# Patient Record
Sex: FEMALE | Race: WHITE | NOT HISPANIC OR LATINO | Employment: OTHER | URBAN - METROPOLITAN AREA
[De-identification: names, ages, dates, MRNs, and addresses within clinical notes are randomized per-mention and may not be internally consistent; named-entity substitution may affect disease eponyms.]

---

## 2023-03-17 LAB
ALANINE AMINOTRANSFERASE (SGPT) (U/L) IN SER/PLAS: 10 U/L (ref 7–45)
ALBUMIN (G/DL) IN SER/PLAS: 4.1 G/DL (ref 3.4–5)
ALKALINE PHOSPHATASE (U/L) IN SER/PLAS: 74 U/L (ref 33–136)
ANION GAP IN SER/PLAS: 13 MMOL/L (ref 10–20)
ASPARTATE AMINOTRANSFERASE (SGOT) (U/L) IN SER/PLAS: 24 U/L (ref 9–39)
BILIRUBIN TOTAL (MG/DL) IN SER/PLAS: 0.6 MG/DL (ref 0–1.2)
CALCIUM (MG/DL) IN SER/PLAS: 9.5 MG/DL (ref 8.6–10.3)
CARBON DIOXIDE, TOTAL (MMOL/L) IN SER/PLAS: 26 MMOL/L (ref 21–32)
CHLORIDE (MMOL/L) IN SER/PLAS: 103 MMOL/L (ref 98–107)
CHOLESTEROL (MG/DL) IN SER/PLAS: 226 MG/DL (ref 0–199)
CHOLESTEROL IN HDL (MG/DL) IN SER/PLAS: 52.5 MG/DL
CHOLESTEROL/HDL RATIO: 4.3
CREATININE (MG/DL) IN SER/PLAS: 1.75 MG/DL (ref 0.5–1.05)
ERYTHROCYTE DISTRIBUTION WIDTH (RATIO) BY AUTOMATED COUNT: 13.4 % (ref 11.5–14.5)
ERYTHROCYTE MEAN CORPUSCULAR HEMOGLOBIN CONCENTRATION (G/DL) BY AUTOMATED: 32.3 G/DL (ref 32–36)
ERYTHROCYTE MEAN CORPUSCULAR VOLUME (FL) BY AUTOMATED COUNT: 98 FL (ref 80–100)
ERYTHROCYTES (10*6/UL) IN BLOOD BY AUTOMATED COUNT: 4.51 X10E12/L (ref 4–5.2)
GFR FEMALE: 27 ML/MIN/1.73M2
GLUCOSE (MG/DL) IN SER/PLAS: 95 MG/DL (ref 74–99)
HEMATOCRIT (%) IN BLOOD BY AUTOMATED COUNT: 44 % (ref 36–46)
HEMOGLOBIN (G/DL) IN BLOOD: 14.2 G/DL (ref 12–16)
LDL: 148 MG/DL (ref 0–99)
LEUKOCYTES (10*3/UL) IN BLOOD BY AUTOMATED COUNT: 6.2 X10E9/L (ref 4.4–11.3)
PLATELETS (10*3/UL) IN BLOOD AUTOMATED COUNT: 239 X10E9/L (ref 150–450)
POTASSIUM (MMOL/L) IN SER/PLAS: 4.3 MMOL/L (ref 3.5–5.3)
PROTEIN TOTAL: 6.8 G/DL (ref 6.4–8.2)
SODIUM (MMOL/L) IN SER/PLAS: 138 MMOL/L (ref 136–145)
THYROTROPIN (MIU/L) IN SER/PLAS BY DETECTION LIMIT <= 0.05 MIU/L: 126 MIU/L (ref 0.44–3.98)
TRIGLYCERIDE (MG/DL) IN SER/PLAS: 129 MG/DL (ref 0–149)
UREA NITROGEN (MG/DL) IN SER/PLAS: 26 MG/DL (ref 6–23)
VLDL: 26 MG/DL (ref 0–40)

## 2023-03-20 ENCOUNTER — TELEPHONE (OUTPATIENT)
Dept: PRIMARY CARE | Facility: CLINIC | Age: 88
End: 2023-03-20
Payer: MEDICARE

## 2023-03-20 NOTE — TELEPHONE ENCOUNTER
----- Message from Zen Alvarez MD sent at 3/17/2023  3:35 PM EDT -----  Please inform the patient that her recent labs were all essentially normal.  Except her kidney function is slightly decreased from previous readings.  We will discuss this further at her upcoming appointment.

## 2023-03-21 ENCOUNTER — TELEPHONE (OUTPATIENT)
Dept: PRIMARY CARE | Facility: CLINIC | Age: 88
End: 2023-03-21
Payer: MEDICARE

## 2023-03-21 DIAGNOSIS — E03.9 HYPOTHYROIDISM, UNSPECIFIED TYPE: ICD-10-CM

## 2023-03-21 RX ORDER — LEVOTHYROXINE SODIUM 125 UG/1
125 TABLET ORAL DAILY
COMMUNITY
End: 2023-03-21 | Stop reason: SDUPTHER

## 2023-03-21 NOTE — TELEPHONE ENCOUNTER
----- Message from Zen Alvarez MD sent at 3/21/2023  8:01 AM EDT -----  Okay please send in 6 months of refills of this medication to their preferred pharmacy.  We should recheck her TSH in 3 months once she is taking it correctly.  Please place an order for this lab.  ----- Message -----  From: Bria Greene MA  Sent: 3/20/2023   4:51 PM EDT  To: Zen Alvarez MD    Spoke with pt she has not taken for a month, I reached out to [facility] [employer] mail delivery and it was on hold. Spoke with daughter and she [facility]   ----- Message -----  From: Zen Alvarez MD  Sent: 3/20/2023   7:20 AM EDT  To: Bria Greene MA    Please inform the patient that her recent thyroid labs show that her thyroid is very low.  Is she taking the levothyroxine 125 mcg daily?  I suspect she may not be taking it correctly because her most recent labs were normal at this dose.  Please let me know.

## 2023-03-22 RX ORDER — LEVOTHYROXINE SODIUM 125 UG/1
125 TABLET ORAL DAILY
Qty: 90 TABLET | Refills: 1 | Status: SHIPPED | OUTPATIENT
Start: 2023-03-22 | End: 2023-08-16 | Stop reason: SDUPTHER

## 2023-03-26 PROBLEM — M54.32 SCIATICA OF LEFT SIDE: Status: ACTIVE | Noted: 2023-03-26

## 2023-03-26 PROBLEM — L82.0 KERATOSIS, INFLAMED SEBORRHEIC: Status: ACTIVE | Noted: 2023-03-26

## 2023-03-26 PROBLEM — L57.0 ACTINIC KERATOSIS: Status: ACTIVE | Noted: 2023-03-26

## 2023-03-26 PROBLEM — S52.502A DISTAL RADIUS FRACTURE, LEFT: Status: ACTIVE | Noted: 2023-03-26

## 2023-03-26 PROBLEM — I10 HYPERTENSION: Status: ACTIVE | Noted: 2023-03-26

## 2023-03-26 PROBLEM — M19.90 OSTEOARTHRITIS: Status: ACTIVE | Noted: 2023-03-26

## 2023-03-26 PROBLEM — R07.81 RIB PAIN ON LEFT SIDE: Status: ACTIVE | Noted: 2023-03-26

## 2023-03-26 PROBLEM — C44.91 BCC (BASAL CELL CARCINOMA): Status: ACTIVE | Noted: 2023-03-26

## 2023-03-26 PROBLEM — R60.9 EDEMA: Status: ACTIVE | Noted: 2023-03-26

## 2023-03-26 PROBLEM — L71.9 ROSACEA: Status: ACTIVE | Noted: 2023-03-26

## 2023-03-26 PROBLEM — R33.9 URINARY RETENTION: Status: ACTIVE | Noted: 2023-03-26

## 2023-03-26 PROBLEM — E03.9 HYPOTHYROIDISM: Status: ACTIVE | Noted: 2023-03-26

## 2023-03-26 PROBLEM — L85.3 XEROSIS OF SKIN: Status: ACTIVE | Noted: 2023-03-26

## 2023-03-26 PROBLEM — L21.9 SEBORRHEIC DERMATITIS: Status: ACTIVE | Noted: 2023-03-26

## 2023-03-26 PROBLEM — M70.60 TROCHANTERIC BURSITIS: Status: ACTIVE | Noted: 2023-03-26

## 2023-03-26 PROBLEM — R73.9 HYPERGLYCEMIA: Status: ACTIVE | Noted: 2023-03-26

## 2023-03-26 PROBLEM — M19.90 DJD (DEGENERATIVE JOINT DISEASE): Status: ACTIVE | Noted: 2023-03-26

## 2023-03-26 PROBLEM — E55.9 VITAMIN D DEFICIENCY: Status: ACTIVE | Noted: 2023-03-26

## 2023-03-26 PROBLEM — B35.1 FUNGAL NAIL INFECTION: Status: ACTIVE | Noted: 2023-03-26

## 2023-03-26 PROBLEM — J38.3 DYSPHONIA, SPASMODIC: Status: ACTIVE | Noted: 2023-03-26

## 2023-03-26 PROBLEM — R53.1 GENERALIZED WEAKNESS: Status: ACTIVE | Noted: 2023-03-26

## 2023-03-26 PROBLEM — J30.2 SEASONAL ALLERGIES: Status: ACTIVE | Noted: 2023-03-26

## 2023-03-26 PROBLEM — S22.31XA RIGHT RIB FRACTURE: Status: ACTIVE | Noted: 2023-03-26

## 2023-03-26 PROBLEM — K58.9 IBS (IRRITABLE BOWEL SYNDROME): Status: ACTIVE | Noted: 2023-03-26

## 2023-03-26 PROBLEM — L20.9 ATOPIC DERMATITIS: Status: ACTIVE | Noted: 2023-03-26

## 2023-03-26 PROBLEM — R20.2 PARESTHESIA: Status: ACTIVE | Noted: 2023-03-26

## 2023-03-26 PROBLEM — G47.00 INSOMNIA: Status: ACTIVE | Noted: 2023-03-26

## 2023-03-26 PROBLEM — M79.675 CHRONIC PAIN OF TOE OF LEFT FOOT: Status: ACTIVE | Noted: 2023-03-26

## 2023-03-26 PROBLEM — H26.9 CATARACT: Status: ACTIVE | Noted: 2023-03-26

## 2023-03-26 PROBLEM — M35.9: Status: ACTIVE | Noted: 2023-03-26

## 2023-03-26 PROBLEM — F41.9 ANXIETY: Status: ACTIVE | Noted: 2023-03-26

## 2023-03-26 PROBLEM — K21.9 ESOPHAGEAL REFLUX: Status: ACTIVE | Noted: 2023-03-26

## 2023-03-26 PROBLEM — L30.1 DYSHIDROSIS: Status: ACTIVE | Noted: 2023-03-26

## 2023-03-26 PROBLEM — M79.7 FIBROMYALGIA: Status: ACTIVE | Noted: 2023-03-26

## 2023-03-26 PROBLEM — M79.674 CHRONIC PAIN OF TOE OF RIGHT FOOT: Status: ACTIVE | Noted: 2023-03-26

## 2023-03-26 PROBLEM — M20.41 ACQUIRED HAMMERTOE OF RIGHT FOOT: Status: ACTIVE | Noted: 2023-03-26

## 2023-03-26 PROBLEM — G89.29 CHRONIC PAIN OF TOE OF RIGHT FOOT: Status: ACTIVE | Noted: 2023-03-26

## 2023-03-26 PROBLEM — M20.5X2 HALLUX LIMITUS OF LEFT FOOT: Status: ACTIVE | Noted: 2023-03-26

## 2023-03-26 PROBLEM — S22.001A: Status: ACTIVE | Noted: 2023-03-26

## 2023-03-26 PROBLEM — R29.6 MULTIPLE FALLS: Status: ACTIVE | Noted: 2023-03-26

## 2023-03-26 PROBLEM — N18.30 CKD (CHRONIC KIDNEY DISEASE), STAGE III (MULTI): Status: ACTIVE | Noted: 2023-03-26

## 2023-03-26 PROBLEM — G89.29 CHRONIC PAIN OF TOE OF LEFT FOOT: Status: ACTIVE | Noted: 2023-03-26

## 2023-03-26 RX ORDER — MELATONIN 10 MG
CAPSULE ORAL
COMMUNITY
Start: 2022-08-24 | End: 2023-12-13 | Stop reason: ALTCHOICE

## 2023-03-26 RX ORDER — LOSARTAN POTASSIUM 25 MG/1
1 TABLET ORAL DAILY
COMMUNITY
End: 2023-08-16 | Stop reason: SDUPTHER

## 2023-03-26 RX ORDER — MONTELUKAST SODIUM 10 MG/1
1 TABLET ORAL DAILY
COMMUNITY
End: 2023-08-16 | Stop reason: SDUPTHER

## 2023-03-26 RX ORDER — OMEPRAZOLE 40 MG/1
1 CAPSULE, DELAYED RELEASE ORAL DAILY
COMMUNITY
Start: 2013-06-24 | End: 2023-08-16 | Stop reason: SDUPTHER

## 2023-03-26 RX ORDER — HYDROXYCHLOROQUINE SULFATE 200 MG/1
1.5 TABLET, FILM COATED ORAL DAILY
COMMUNITY
Start: 2013-06-24 | End: 2023-08-16 | Stop reason: SDUPTHER

## 2023-03-26 RX ORDER — HYDROXYZINE HYDROCHLORIDE 25 MG/1
1 TABLET, FILM COATED ORAL NIGHTLY
COMMUNITY
Start: 2020-08-18 | End: 2023-08-16 | Stop reason: SDUPTHER

## 2023-03-26 RX ORDER — AMLODIPINE BESYLATE 5 MG/1
1 TABLET ORAL DAILY
COMMUNITY
End: 2023-08-16 | Stop reason: SDUPTHER

## 2023-03-26 RX ORDER — ATENOLOL 25 MG/1
1 TABLET ORAL DAILY
COMMUNITY
Start: 2013-06-24 | End: 2023-08-16 | Stop reason: SDUPTHER

## 2023-03-26 RX ORDER — SERTRALINE HYDROCHLORIDE 50 MG/1
1 TABLET, FILM COATED ORAL DAILY
COMMUNITY
End: 2023-08-16 | Stop reason: SDUPTHER

## 2023-03-30 ENCOUNTER — OFFICE VISIT (OUTPATIENT)
Dept: PRIMARY CARE | Facility: CLINIC | Age: 88
End: 2023-03-30
Payer: MEDICARE

## 2023-03-30 VITALS
HEART RATE: 66 BPM | BODY MASS INDEX: 23.75 KG/M2 | WEIGHT: 121 LBS | DIASTOLIC BLOOD PRESSURE: 68 MMHG | TEMPERATURE: 98 F | RESPIRATION RATE: 18 BRPM | HEIGHT: 60 IN | SYSTOLIC BLOOD PRESSURE: 126 MMHG

## 2023-03-30 DIAGNOSIS — S20.212D CONTUSION OF RIB ON LEFT SIDE, SUBSEQUENT ENCOUNTER: Primary | ICD-10-CM

## 2023-03-30 DIAGNOSIS — S00.83XD CONTUSION OF FOREHEAD, SUBSEQUENT ENCOUNTER: ICD-10-CM

## 2023-03-30 PROBLEM — S52.502A DISTAL RADIUS FRACTURE, LEFT: Status: RESOLVED | Noted: 2023-03-26 | Resolved: 2023-03-30

## 2023-03-30 PROBLEM — L82.0 KERATOSIS, INFLAMED SEBORRHEIC: Status: RESOLVED | Noted: 2023-03-26 | Resolved: 2023-03-30

## 2023-03-30 PROBLEM — B35.1 FUNGAL NAIL INFECTION: Status: RESOLVED | Noted: 2023-03-26 | Resolved: 2023-03-30

## 2023-03-30 PROCEDURE — 1159F MED LIST DOCD IN RCRD: CPT | Performed by: FAMILY MEDICINE

## 2023-03-30 PROCEDURE — 3078F DIAST BP <80 MM HG: CPT | Performed by: FAMILY MEDICINE

## 2023-03-30 PROCEDURE — 1036F TOBACCO NON-USER: CPT | Performed by: FAMILY MEDICINE

## 2023-03-30 PROCEDURE — 1157F ADVNC CARE PLAN IN RCRD: CPT | Performed by: FAMILY MEDICINE

## 2023-03-30 PROCEDURE — 99214 OFFICE O/P EST MOD 30 MIN: CPT | Performed by: FAMILY MEDICINE

## 2023-03-30 PROCEDURE — 3074F SYST BP LT 130 MM HG: CPT | Performed by: FAMILY MEDICINE

## 2023-03-30 PROCEDURE — 1160F RVW MEDS BY RX/DR IN RCRD: CPT | Performed by: FAMILY MEDICINE

## 2023-03-30 NOTE — PROGRESS NOTES
Fadumo Corona is a 89 y.o. female here today for ED follow up after a fall on 3/21 at [facility].     She was cleaning her tub and fell and hit left ribs and head.  She lives at an assisted care facility and there housekeepers who clean her room frequently but she says there was a dirty spot in the tub and she was trying to wipe it off when she slipped and fell.  No LOC.  Went to ER and there were no fractures.  Now with pain in left ribs anteriorly.  I reviewed the records and results from her ER visit.  She denies any headache or neurological symptoms.  Her left anterior and lateral ribs are sore but she is not having any trouble breathing.  She denies any shortness of breath or cardiac type chest pain.  She denies any other injury that occurred with this fall.  She is here today with her daughter.    Objective    Visit Vitals  /68   Pulse 66   Temp 36.7 °C (98 °F)   Resp 18       Physical Exam   General - Not in acute distress and cooperative.  Build & Nutrition - Well developed  Posture - Normal  Gait - Normal  Mental Status - alert and oriented x 3    Head - Normocephalic    Neck - Thyroid normal size    Eyes - Bilateral - Sclera clear and lids pink without edema or mass.      Skin - Warm and dry with no rashes on visible skin    Lungs - Clear to auscultation and normal breathing effort    Cardiovascular - RRR and no murmurs, rubs or thrill.    Peripheral Vascular - Bilateral - no edema present    Neuropsychiatric - normal mood and affect    Ribs-patient does have some tenderness along the lower anterior and lateral ribs but I do not feel any step-off or irregularity.  There is no bruising or erythema.      Assessment      Assess/Plan SmartLinks: Diagnoses and all orders for this visit:  Contusion of rib on left side, subsequent encounter  Contusion of forehead, subsequent encounter  The patient had a slip at her assisted living apartment as above.  Her x-rays from the ER and CT scan did not reveal any  fractures or other serious problems.  It will take time for her bruised ribs to resolve.  I strongly recommend that she exercise more caution to prevent further falls.  She has had many falls over time but they are usually secondary to her lack of caution.  She has all of the protective equipment and assistive devices that she needs but she needs to use them appropriately and be more careful.  The patient and her daughter both agree with my assessment.  We will follow-up as needed.

## 2023-03-31 PROBLEM — S20.212A CONTUSION OF RIB ON LEFT SIDE: Status: ACTIVE | Noted: 2023-03-31

## 2023-03-31 PROBLEM — S00.83XA FOREHEAD CONTUSION: Status: ACTIVE | Noted: 2023-03-31

## 2023-04-14 ENCOUNTER — APPOINTMENT (OUTPATIENT)
Dept: PRIMARY CARE | Facility: CLINIC | Age: 88
End: 2023-04-14
Payer: MEDICARE

## 2023-08-16 DIAGNOSIS — M35.9: ICD-10-CM

## 2023-08-16 DIAGNOSIS — K21.9 GASTROESOPHAGEAL REFLUX DISEASE WITHOUT ESOPHAGITIS: ICD-10-CM

## 2023-08-16 DIAGNOSIS — G47.00 INSOMNIA, UNSPECIFIED TYPE: ICD-10-CM

## 2023-08-16 DIAGNOSIS — F41.9 ANXIETY: ICD-10-CM

## 2023-08-16 DIAGNOSIS — E03.9 HYPOTHYROIDISM, UNSPECIFIED TYPE: ICD-10-CM

## 2023-08-16 DIAGNOSIS — J30.2 SEASONAL ALLERGIES: ICD-10-CM

## 2023-08-16 DIAGNOSIS — I10 PRIMARY HYPERTENSION: ICD-10-CM

## 2023-08-18 RX ORDER — ATENOLOL 25 MG/1
25 TABLET ORAL DAILY
Qty: 30 TABLET | Refills: 0 | Status: SHIPPED | OUTPATIENT
Start: 2023-08-18 | End: 2023-09-07 | Stop reason: SDUPTHER

## 2023-08-18 RX ORDER — SERTRALINE HYDROCHLORIDE 50 MG/1
50 TABLET, FILM COATED ORAL DAILY
Qty: 30 TABLET | Refills: 0 | Status: SHIPPED | OUTPATIENT
Start: 2023-08-18 | End: 2023-09-07 | Stop reason: SDUPTHER

## 2023-08-18 RX ORDER — OMEPRAZOLE 40 MG/1
40 CAPSULE, DELAYED RELEASE ORAL DAILY
Qty: 30 CAPSULE | Refills: 0 | Status: SHIPPED | OUTPATIENT
Start: 2023-08-18 | End: 2023-09-07 | Stop reason: SDUPTHER

## 2023-08-18 RX ORDER — LEVOTHYROXINE SODIUM 125 UG/1
125 TABLET ORAL DAILY
Qty: 30 TABLET | Refills: 0 | Status: SHIPPED | OUTPATIENT
Start: 2023-08-18 | End: 2023-09-07 | Stop reason: SDUPTHER

## 2023-08-18 RX ORDER — HYDROXYCHLOROQUINE SULFATE 200 MG/1
300 TABLET, FILM COATED ORAL DAILY
Qty: 30 TABLET | Refills: 0 | Status: SHIPPED | OUTPATIENT
Start: 2023-08-18 | End: 2023-09-07 | Stop reason: SDUPTHER

## 2023-08-18 RX ORDER — MONTELUKAST SODIUM 10 MG/1
10 TABLET ORAL DAILY
Qty: 30 TABLET | Refills: 0 | Status: SHIPPED | OUTPATIENT
Start: 2023-08-18 | End: 2023-09-07

## 2023-08-18 RX ORDER — LOSARTAN POTASSIUM 25 MG/1
25 TABLET ORAL DAILY
Qty: 30 TABLET | Refills: 0 | Status: SHIPPED | OUTPATIENT
Start: 2023-08-18 | End: 2023-09-07 | Stop reason: SDUPTHER

## 2023-08-18 RX ORDER — AMLODIPINE BESYLATE 5 MG/1
5 TABLET ORAL DAILY
Qty: 30 TABLET | Refills: 0 | Status: SHIPPED | OUTPATIENT
Start: 2023-08-18 | End: 2023-09-07 | Stop reason: SDUPTHER

## 2023-08-18 RX ORDER — HYDROXYZINE HYDROCHLORIDE 25 MG/1
25 TABLET, FILM COATED ORAL NIGHTLY
Qty: 30 TABLET | Refills: 0 | Status: SHIPPED | OUTPATIENT
Start: 2023-08-18 | End: 2023-09-07 | Stop reason: SDUPTHER

## 2023-09-07 ENCOUNTER — OFFICE VISIT (OUTPATIENT)
Dept: PRIMARY CARE | Facility: CLINIC | Age: 88
End: 2023-09-07
Payer: MEDICARE

## 2023-09-07 VITALS
HEART RATE: 63 BPM | OXYGEN SATURATION: 97 % | BODY MASS INDEX: 22.38 KG/M2 | TEMPERATURE: 97.3 F | DIASTOLIC BLOOD PRESSURE: 70 MMHG | HEIGHT: 60 IN | WEIGHT: 114 LBS | SYSTOLIC BLOOD PRESSURE: 130 MMHG | RESPIRATION RATE: 16 BRPM

## 2023-09-07 DIAGNOSIS — F41.1 GAD (GENERALIZED ANXIETY DISORDER): Primary | ICD-10-CM

## 2023-09-07 DIAGNOSIS — K21.9 GASTROESOPHAGEAL REFLUX DISEASE WITHOUT ESOPHAGITIS: ICD-10-CM

## 2023-09-07 DIAGNOSIS — G47.00 INSOMNIA, UNSPECIFIED TYPE: ICD-10-CM

## 2023-09-07 DIAGNOSIS — M35.9: ICD-10-CM

## 2023-09-07 DIAGNOSIS — E03.9 ACQUIRED HYPOTHYROIDISM: ICD-10-CM

## 2023-09-07 DIAGNOSIS — I10 PRIMARY HYPERTENSION: ICD-10-CM

## 2023-09-07 DIAGNOSIS — N18.31 STAGE 3A CHRONIC KIDNEY DISEASE (MULTI): ICD-10-CM

## 2023-09-07 PROBLEM — Z96.652 PRESENCE OF LEFT ARTIFICIAL KNEE JOINT: Status: ACTIVE | Noted: 2019-01-17

## 2023-09-07 PROBLEM — M54.16 LUMBAR RADICULOPATHY: Status: ACTIVE | Noted: 2018-11-16

## 2023-09-07 PROBLEM — M47.817 LUMBOSACRAL SPONDYLOSIS WITHOUT MYELOPATHY: Status: ACTIVE | Noted: 2018-11-06

## 2023-09-07 PROBLEM — M25.559 GREATER TROCHANTERIC PAIN SYNDROME: Status: ACTIVE | Noted: 2017-04-27

## 2023-09-07 PROBLEM — S00.83XA FOREHEAD CONTUSION: Status: RESOLVED | Noted: 2023-03-31 | Resolved: 2023-09-07

## 2023-09-07 PROBLEM — M47.816 LUMBAR SPONDYLOSIS: Status: ACTIVE | Noted: 2019-03-12

## 2023-09-07 PROBLEM — M79.10 MYALGIA, UNSPECIFIED SITE: Status: ACTIVE | Noted: 2019-03-12

## 2023-09-07 PROBLEM — R73.9 HYPERGLYCEMIA: Status: RESOLVED | Noted: 2023-03-26 | Resolved: 2023-09-07

## 2023-09-07 PROCEDURE — 1157F ADVNC CARE PLAN IN RCRD: CPT | Performed by: FAMILY MEDICINE

## 2023-09-07 PROCEDURE — 3075F SYST BP GE 130 - 139MM HG: CPT | Performed by: FAMILY MEDICINE

## 2023-09-07 PROCEDURE — 1159F MED LIST DOCD IN RCRD: CPT | Performed by: FAMILY MEDICINE

## 2023-09-07 PROCEDURE — 1160F RVW MEDS BY RX/DR IN RCRD: CPT | Performed by: FAMILY MEDICINE

## 2023-09-07 PROCEDURE — 1036F TOBACCO NON-USER: CPT | Performed by: FAMILY MEDICINE

## 2023-09-07 PROCEDURE — 3078F DIAST BP <80 MM HG: CPT | Performed by: FAMILY MEDICINE

## 2023-09-07 PROCEDURE — 99214 OFFICE O/P EST MOD 30 MIN: CPT | Performed by: FAMILY MEDICINE

## 2023-09-07 RX ORDER — LEVOTHYROXINE SODIUM 125 UG/1
125 TABLET ORAL DAILY
Qty: 90 TABLET | Refills: 1 | Status: SHIPPED | OUTPATIENT
Start: 2023-09-07 | End: 2023-09-22 | Stop reason: ALTCHOICE

## 2023-09-07 RX ORDER — LATANOPROST 50 UG/ML
1 SOLUTION/ DROPS OPHTHALMIC NIGHTLY
COMMUNITY
Start: 2023-08-17

## 2023-09-07 RX ORDER — HYDROXYZINE HYDROCHLORIDE 25 MG/1
25 TABLET, FILM COATED ORAL NIGHTLY
Qty: 90 TABLET | Refills: 1 | Status: SHIPPED | OUTPATIENT
Start: 2023-09-07 | End: 2024-01-12 | Stop reason: SDUPTHER

## 2023-09-07 RX ORDER — AMLODIPINE BESYLATE 5 MG/1
5 TABLET ORAL DAILY
Qty: 90 TABLET | Refills: 1 | Status: SHIPPED | OUTPATIENT
Start: 2023-09-07 | End: 2024-01-12 | Stop reason: SDUPTHER

## 2023-09-07 RX ORDER — HYDROXYCHLOROQUINE SULFATE 200 MG/1
300 TABLET, FILM COATED ORAL DAILY
Qty: 135 TABLET | Refills: 1 | Status: SHIPPED | OUTPATIENT
Start: 2023-09-07 | End: 2023-12-13 | Stop reason: DRUGHIGH

## 2023-09-07 RX ORDER — LOSARTAN POTASSIUM 25 MG/1
25 TABLET ORAL DAILY
Qty: 90 TABLET | Refills: 1 | Status: SHIPPED | OUTPATIENT
Start: 2023-09-07 | End: 2023-12-13 | Stop reason: ALTCHOICE

## 2023-09-07 RX ORDER — SERTRALINE HYDROCHLORIDE 50 MG/1
50 TABLET, FILM COATED ORAL DAILY
Qty: 90 TABLET | Refills: 1 | Status: SHIPPED | OUTPATIENT
Start: 2023-09-07 | End: 2023-12-13 | Stop reason: DRUGHIGH

## 2023-09-07 RX ORDER — FUROSEMIDE 20 MG/1
TABLET ORAL
COMMUNITY
Start: 2023-05-29 | End: 2023-12-13 | Stop reason: ALTCHOICE

## 2023-09-07 RX ORDER — ATENOLOL 25 MG/1
25 TABLET ORAL DAILY
Qty: 90 TABLET | Refills: 1 | Status: SHIPPED | OUTPATIENT
Start: 2023-09-07 | End: 2024-01-12 | Stop reason: SDUPTHER

## 2023-09-07 RX ORDER — OMEPRAZOLE 40 MG/1
40 CAPSULE, DELAYED RELEASE ORAL DAILY
Qty: 90 CAPSULE | Refills: 1 | Status: SHIPPED | OUTPATIENT
Start: 2023-09-07 | End: 2024-01-12 | Stop reason: SDUPTHER

## 2023-09-07 NOTE — PROGRESS NOTES
Fadumo Corona is a 89 y.o. female here today for 6 month follow up.   Chief Complaint   Patient presents with    Hypertension    Hypothyroidism    reflux    Anxiety    Insomnia    Allergies    Chronic Kidney Disease        HPI   The patient is here today for recheck.  She reports no recent falls.    HTN recheck -- Patient denies chest pain, SOB, edema, palpitations on review.  Taking medication correctly and denies any side effects.    Hypothyroidism -- patient reports no problems with dry skin, constipation, cold intolerance, unexplained weight loss, fatigue or heart palpitations. Patient is taking medications correctly with no side effects or problems.    GERD recheck -- Patient reports GI symptoms are well controlled with current treatment.  No heartburn, chest pain, vomiting, diarrhea.  No SE's from current treatment.  Patient wishes to continue the same treatment.    Mood disorder recheck -- Patient feels like condition is well controlled.  Has good control of mood and emotional reactions.  No SE's or problems with medications.  No homicidal or suicidal ideation.  Patient wishes to continue same medications.    Chronic kidney disease - due for labs.  Was Cr of 1.75 in March 2023.      Seasonal allergies- Condition well controlled.   She stopped montelukast about one month ago b/c she did not think she needed it.  She has not noticed any significant allergy symptoms since stopping the medicine.    Chronic insomnia- sleeping pretty well with hydroxyzine.          Current Outpatient Medications:     latanoprost (Xalatan) 0.005 % ophthalmic solution, Administer 1 drop into both eyes., Disp: , Rfl:     melatonin 10 mg capsule, Melatonin 10 MG Oral Capsule  Refills: 0      Start : 24-Aug-2022  Active, Disp: , Rfl:     amLODIPine (Norvasc) 5 mg tablet, Take 1 tablet (5 mg) by mouth once daily. As directed, Disp: 90 tablet, Rfl: 1    atenolol (Tenormin) 25 mg tablet, Take 1 tablet (25 mg) by mouth once daily., Disp:  90 tablet, Rfl: 1    furosemide (Lasix) 20 mg tablet, , Disp: , Rfl:     hydroxychloroquine (Plaquenil) 200 mg tablet, Take 1.5 tablets (300 mg) by mouth once daily., Disp: 135 tablet, Rfl: 1    hydrOXYzine HCL (Atarax) 25 mg tablet, Take 1 tablet (25 mg) by mouth once daily at bedtime., Disp: 90 tablet, Rfl: 1    levothyroxine (Synthroid, Levoxyl) 125 mcg tablet, Take 1 tablet (125 mcg) by mouth once daily., Disp: 90 tablet, Rfl: 1    losartan (Cozaar) 25 mg tablet, Take 1 tablet (25 mg) by mouth once daily., Disp: 90 tablet, Rfl: 1    omeprazole (PriLOSEC) 40 mg DR capsule, Take 1 capsule (40 mg) by mouth once daily., Disp: 90 capsule, Rfl: 1    sertraline (Zoloft) 50 mg tablet, Take 1 tablet (50 mg) by mouth once daily., Disp: 90 tablet, Rfl: 1    Patient Active Problem List   Diagnosis    Acquired hammertoe of right foot    Actinic keratosis    UMAIR (generalized anxiety disorder)    BCC (basal cell carcinoma)    Burst fracture of thoracic vertebra (CMS/HCC)    Cataract    Chronic pain of toe of left foot    Chronic pain of toe of right foot    CKD (chronic kidney disease), stage III (CMS/HCC)    Collagen vascular disease (CMS/HCC)    Dysphonia, spasmodic    Edema    Gastroesophageal reflux disease without esophagitis    Fibromyalgia    Generalized weakness    Primary hypertension    Acquired hypothyroidism    IBS (irritable bowel syndrome)    Insomnia    Multiple falls    DJD (degenerative joint disease)    Osteoarthritis    Paresthesia    Rib pain on left side    Right rib fracture    Rosacea    Sciatica of left side    Seasonal allergies    Seborrheic dermatitis    Trochanteric bursitis    Urinary retention    Vitamin D deficiency    Xerosis of skin    Atopic dermatitis    Dyshidrosis    Hallux limitus of left foot    Contusion of rib on left side    Lumbar radiculopathy    Lumbar spondylosis    Lumbosacral spondylosis without myelopathy    Goldstein's neuroma    Muscle weakness    Other voice and resonance  disorders    Pes planus    Presence of left artificial knee joint    Enthesopathy of hip region    Greater trochanteric pain syndrome    Myalgia, unspecified site    Pain due to onychomycosis of toenails of both feet         No results found for this or any previous visit (from the past 672 hour(s)).     Objective    Visit Vitals  /70   Pulse 63   Temp 36.3 °C (97.3 °F)   Resp 16   Ht 1.524 m (5')   Wt 51.7 kg (114 lb)   SpO2 97%   BMI 22.26 kg/m²     Body mass index is 22.26 kg/m².     Physical Exam   General - Not in acute distress and cooperative.  Build & Nutrition - Well developed  Posture - Normal  Gait - Normal  Mental Status - alert and oriented x 3    Head - Normocephalic    Neck - Thyroid normal size    Eyes - Bilateral - Sclera clear and lids pink without edema or mass.      Skin - Warm and dry with no rashes on visible skin    Lungs - Clear to auscultation and normal breathing effort    Cardiovascular - RRR and no murmurs, rubs or thrill.    Peripheral Vascular - Bilateral - no edema present    Neuropsychiatric - normal mood and affect        Assessment    1. UMAIR (generalized anxiety disorder)  sertraline (Zoloft) 50 mg tablet   Condition well controlled.  No change in current treatment regimen.  Refill given of current medication.  Make a follow up appointment with me for recheck in 6 months.     2. Primary hypertension  amLODIPine (Norvasc) 5 mg tablet, atenolol (Tenormin) 25 mg tablet, losartan (Cozaar) 25 mg tablet, Comprehensive Metabolic Panel, CBC, TSH with reflex to Free T4 if abnormal   Condition well controlled.  No change in current treatment regimen.  Refill given of current medication.  Appropriate labs ordered or reviewed.  Make a follow up appointment with me for recheck in 6 months.     3. Collagen vascular disease (CMS/HCC)  hydroxychloroquine (Plaquenil) 200 mg tablet   Condition well controlled.  No change in current treatment regimen.  Refill given of current medication.   Make a follow up appointment with me for recheck in 6 months.     4. Insomnia, unspecified type  hydrOXYzine HCL (Atarax) 25 mg tablet   Condition well controlled.  No change in current treatment regimen.  Refill given of current medication.  Make a follow up appointment with me for recheck in 6 months.     5. Gastroesophageal reflux disease without esophagitis  omeprazole (PriLOSEC) 40 mg DR capsule   Condition well controlled.  No change in current treatment regimen.  Refill given of current medication.  Make a follow up appointment with me for recheck in 6 months.     6. Acquired hypothyroidism  levothyroxine (Synthroid, Levoxyl) 125 mcg tablet, TSH with reflex to Free T4 if abnormal   Condition well controlled.  No change in current treatment regimen.  Refill given of current medication.  Appropriate labs ordered or reviewed.  Make a follow up appointment with me for recheck in 6 months.     7. Stage 3a chronic kidney disease (CMS/HCC)  Comprehensive Metabolic Panel, CBC, TSH with reflex to Free T4 if abnormal   Condition well controlled.  No change in current treatment regimen.  Refill given of current medication.  Make a follow up appointment with me for recheck in 6 months.       Patient was identified as a fall risk. Risk prevention instructions provided.

## 2023-09-08 NOTE — PATIENT INSTRUCTIONS

## 2023-09-21 ENCOUNTER — LAB (OUTPATIENT)
Dept: LAB | Facility: LAB | Age: 88
End: 2023-09-21
Payer: MEDICARE

## 2023-09-21 DIAGNOSIS — N18.31 STAGE 3A CHRONIC KIDNEY DISEASE (MULTI): ICD-10-CM

## 2023-09-21 DIAGNOSIS — E03.9 ACQUIRED HYPOTHYROIDISM: ICD-10-CM

## 2023-09-21 DIAGNOSIS — I10 PRIMARY HYPERTENSION: ICD-10-CM

## 2023-09-21 LAB
ALANINE AMINOTRANSFERASE (SGPT) (U/L) IN SER/PLAS: 11 U/L (ref 7–45)
ALBUMIN (G/DL) IN SER/PLAS: 4 G/DL (ref 3.4–5)
ALKALINE PHOSPHATASE (U/L) IN SER/PLAS: 73 U/L (ref 33–136)
ANION GAP IN SER/PLAS: 12 MMOL/L (ref 10–20)
ASPARTATE AMINOTRANSFERASE (SGOT) (U/L) IN SER/PLAS: 22 U/L (ref 9–39)
BILIRUBIN TOTAL (MG/DL) IN SER/PLAS: 0.6 MG/DL (ref 0–1.2)
CALCIUM (MG/DL) IN SER/PLAS: 9.5 MG/DL (ref 8.6–10.3)
CARBON DIOXIDE, TOTAL (MMOL/L) IN SER/PLAS: 27 MMOL/L (ref 21–32)
CHLORIDE (MMOL/L) IN SER/PLAS: 105 MMOL/L (ref 98–107)
CREATININE (MG/DL) IN SER/PLAS: 1.57 MG/DL (ref 0.5–1.05)
ERYTHROCYTE DISTRIBUTION WIDTH (RATIO) BY AUTOMATED COUNT: 12.3 % (ref 11.5–14.5)
ERYTHROCYTE MEAN CORPUSCULAR HEMOGLOBIN CONCENTRATION (G/DL) BY AUTOMATED: 32.5 G/DL (ref 32–36)
ERYTHROCYTE MEAN CORPUSCULAR VOLUME (FL) BY AUTOMATED COUNT: 99 FL (ref 80–100)
ERYTHROCYTES (10*6/UL) IN BLOOD BY AUTOMATED COUNT: 4.12 X10E12/L (ref 4–5.2)
GFR FEMALE: 31 ML/MIN/1.73M2
GLUCOSE (MG/DL) IN SER/PLAS: 99 MG/DL (ref 74–99)
HEMATOCRIT (%) IN BLOOD BY AUTOMATED COUNT: 40.6 % (ref 36–46)
HEMOGLOBIN (G/DL) IN BLOOD: 13.2 G/DL (ref 12–16)
LEUKOCYTES (10*3/UL) IN BLOOD BY AUTOMATED COUNT: 5.9 X10E9/L (ref 4.4–11.3)
PLATELETS (10*3/UL) IN BLOOD AUTOMATED COUNT: 249 X10E9/L (ref 150–450)
POTASSIUM (MMOL/L) IN SER/PLAS: 3.8 MMOL/L (ref 3.5–5.3)
PROTEIN TOTAL: 6.8 G/DL (ref 6.4–8.2)
SODIUM (MMOL/L) IN SER/PLAS: 140 MMOL/L (ref 136–145)
THYROTROPIN (MIU/L) IN SER/PLAS BY DETECTION LIMIT <= 0.05 MIU/L: 0.04 MIU/L (ref 0.44–3.98)
THYROXINE (T4) FREE (NG/DL) IN SER/PLAS: 1.46 NG/DL (ref 0.61–1.12)
UREA NITROGEN (MG/DL) IN SER/PLAS: 24 MG/DL (ref 6–23)

## 2023-09-21 PROCEDURE — 36415 COLL VENOUS BLD VENIPUNCTURE: CPT

## 2023-09-21 PROCEDURE — 84439 ASSAY OF FREE THYROXINE: CPT

## 2023-09-21 PROCEDURE — 85027 COMPLETE CBC AUTOMATED: CPT

## 2023-09-21 PROCEDURE — 80053 COMPREHEN METABOLIC PANEL: CPT

## 2023-09-21 PROCEDURE — 84443 ASSAY THYROID STIM HORMONE: CPT

## 2023-09-22 ENCOUNTER — TELEPHONE (OUTPATIENT)
Dept: PRIMARY CARE | Facility: CLINIC | Age: 88
End: 2023-09-22
Payer: MEDICARE

## 2023-09-22 DIAGNOSIS — E03.9 ACQUIRED HYPOTHYROIDISM: ICD-10-CM

## 2023-09-22 RX ORDER — LEVOTHYROXINE SODIUM 112 UG/1
112 TABLET ORAL DAILY
Qty: 90 TABLET | Refills: 1 | Status: SHIPPED | OUTPATIENT
Start: 2023-09-22 | End: 2023-11-27 | Stop reason: SDUPTHER

## 2023-09-22 NOTE — RESULT ENCOUNTER NOTE
Please inform the patient that her recent labs were all essentially normal or stable except her thyroid was too high.  So we need to decrease the dose of levothyroxine 112 mcg daily.  Please send in a 6-month prescription and cancel the other dose.  We need to recheck her TSH and free T4 in 2 months.  Please place an order for this and tell the patient to get this lab done around November 22.

## 2023-11-13 ENCOUNTER — TELEPHONE (OUTPATIENT)
Dept: PRIMARY CARE | Facility: CLINIC | Age: 88
End: 2023-11-13
Payer: MEDICARE

## 2023-11-13 NOTE — TELEPHONE ENCOUNTER
Patients daughter, Anuj Doss called and asked for any medical update on patient since she was in the hospital last week and transferred to rehab. She stated the hospital or the rehab have given her any update on her mother. Please advise.

## 2023-11-24 ENCOUNTER — DOCUMENTATION (OUTPATIENT)
Dept: PRIMARY CARE | Facility: CLINIC | Age: 88
End: 2023-11-24
Payer: MEDICARE

## 2023-11-24 ENCOUNTER — PATIENT OUTREACH (OUTPATIENT)
Dept: CARE COORDINATION | Facility: CLINIC | Age: 88
End: 2023-11-24
Payer: MEDICARE

## 2023-11-24 DIAGNOSIS — E03.9 ACQUIRED HYPOTHYROIDISM: ICD-10-CM

## 2023-11-24 NOTE — PROGRESS NOTES
Discharge Facility: [facility]  Discharge Diagnosis: Failure to thrive  Admission Date: 11/11/2023  Discharge Date: 11/23/2023    Pt was admitted to [facility] 11/7-11/11/2023.    PCP Appointment Date:  -12/12/2023 1140; Steve declines sooner appt    Hospital Encounter and Summary: Linked     See discharge assessment below for further details    Engagement  Call Start Time: 1112 (11/24/2023 11:20 AM)    Medications  Does the patient have all medications ordered at discharge?: Not applicable (11/24/2023 11:20 AM)  Care Management Interventions: No intervention needed (11/24/2023 11:20 AM)  Is the patient taking all medications as directed (includes completed medication regime)?: Yes (11/24/2023 11:20 AM)    Appointments  Does the patient have a primary care provider?: Yes (11/24/2023 11:20 AM)  Care Management Interventions: Verified appointment date/time/provider (11/24/2023 11:20 AM)  Has the patient kept scheduled appointments due by today?: Yes (11/24/2023 11:20 AM)    Self Management  Has home health visited the patient within 72 hours of discharge?: Yes (11/24/2023 11:20 AM)    Patient Teaching  Does the patient have access to their discharge instructions?: Yes (11/24/2023 11:20 AM)  Care Management Interventions: Reviewed instructions with patient (11/24/2023 11:20 AM)  What is the patient's perception of their health status since discharge?: Improving (11/24/2023 11:20 AM)  Is the patient/caregiver able to teach back the hierarchy of who to call/visit for symptoms/problems? PCP, Specialist, Home Health nurse, Urgent Care, ED, 911: Yes (11/24/2023 11:20 AM)    Wrap Up  Wrap Up Additional Comments: Pt was admitted to [facility] 11/7-11/11/2023 and discharged to [facility] 11/11-11/23/2023. Pt discharged to assisted living ([facility]). Spoke with pt son Steve and pt daughter Anju. Steve reports pt is receiving [facility] and  they were out yesterday to go through medications. Steve and Anju deny any questions, needs, or concerns. Anju would like to verify synthroid dose and request refill- will notify Dr. Alvarez. Pt scheduled for PCP follow up 12/12/2023 1140. Steve and Anju encouraged to call if questions or needs arise. (11/24/2023 11:20 AM)  Call End Time: 1130 (11/24/2023 11:20 AM)

## 2023-11-27 RX ORDER — LEVOTHYROXINE SODIUM 112 UG/1
112 TABLET ORAL DAILY
Qty: 90 TABLET | Refills: 1 | Status: SHIPPED | OUTPATIENT
Start: 2023-11-27 | End: 2023-12-04 | Stop reason: SDUPTHER

## 2023-12-04 DIAGNOSIS — E03.9 ACQUIRED HYPOTHYROIDISM: ICD-10-CM

## 2023-12-05 RX ORDER — LEVOTHYROXINE SODIUM 112 UG/1
112 TABLET ORAL DAILY
Qty: 90 TABLET | Refills: 1 | Status: SHIPPED | OUTPATIENT
Start: 2023-12-05 | End: 2023-12-13 | Stop reason: SDUPTHER

## 2023-12-12 ENCOUNTER — APPOINTMENT (OUTPATIENT)
Dept: PRIMARY CARE | Facility: CLINIC | Age: 88
End: 2023-12-12
Payer: MEDICARE

## 2023-12-13 ENCOUNTER — OFFICE VISIT (OUTPATIENT)
Dept: PRIMARY CARE | Facility: CLINIC | Age: 88
End: 2023-12-13
Payer: MEDICARE

## 2023-12-13 VITALS
DIASTOLIC BLOOD PRESSURE: 70 MMHG | RESPIRATION RATE: 16 BRPM | HEART RATE: 56 BPM | HEIGHT: 60 IN | TEMPERATURE: 97.3 F | BODY MASS INDEX: 22.19 KG/M2 | WEIGHT: 113 LBS | SYSTOLIC BLOOD PRESSURE: 120 MMHG

## 2023-12-13 DIAGNOSIS — F33.41 RECURRENT MAJOR DEPRESSIVE DISORDER, IN PARTIAL REMISSION (CMS-HCC): ICD-10-CM

## 2023-12-13 DIAGNOSIS — M35.9: ICD-10-CM

## 2023-12-13 DIAGNOSIS — E03.9 ACQUIRED HYPOTHYROIDISM: ICD-10-CM

## 2023-12-13 DIAGNOSIS — R62.7 FAILURE TO THRIVE IN ADULT: Primary | ICD-10-CM

## 2023-12-13 DIAGNOSIS — N18.31 STAGE 3A CHRONIC KIDNEY DISEASE (MULTI): ICD-10-CM

## 2023-12-13 DIAGNOSIS — F41.1 GAD (GENERALIZED ANXIETY DISORDER): ICD-10-CM

## 2023-12-13 DIAGNOSIS — I10 PRIMARY HYPERTENSION: ICD-10-CM

## 2023-12-13 PROBLEM — F33.9 MAJOR DEPRESSIVE DISORDER, RECURRENT, UNSPECIFIED (CMS-HCC): Status: ACTIVE | Noted: 2023-11-11

## 2023-12-13 PROBLEM — R13.12 DYSPHAGIA, OROPHARYNGEAL PHASE: Status: ACTIVE | Noted: 2023-11-11

## 2023-12-13 PROBLEM — R26.2 DIFFICULTY IN WALKING, NOT ELSEWHERE CLASSIFIED: Status: ACTIVE | Noted: 2023-11-11

## 2023-12-13 PROBLEM — S22.000A THORACIC COMPRESSION FRACTURE (MULTI): Status: ACTIVE | Noted: 2023-11-08

## 2023-12-13 PROBLEM — E78.5 HYPERLIPIDEMIA: Status: ACTIVE | Noted: 2023-11-07

## 2023-12-13 PROBLEM — E43 SEVERE PROTEIN-CALORIE MALNUTRITION (MULTI): Status: ACTIVE | Noted: 2023-11-08

## 2023-12-13 PROBLEM — R11.0 NAUSEA: Status: ACTIVE | Noted: 2023-11-11

## 2023-12-13 PROBLEM — J45.909 UNSPECIFIED ASTHMA, UNCOMPLICATED (HHS-HCC): Status: ACTIVE | Noted: 2023-11-11

## 2023-12-13 PROCEDURE — 3078F DIAST BP <80 MM HG: CPT | Performed by: FAMILY MEDICINE

## 2023-12-13 PROCEDURE — 1160F RVW MEDS BY RX/DR IN RCRD: CPT | Performed by: FAMILY MEDICINE

## 2023-12-13 PROCEDURE — 99215 OFFICE O/P EST HI 40 MIN: CPT | Performed by: FAMILY MEDICINE

## 2023-12-13 PROCEDURE — 1159F MED LIST DOCD IN RCRD: CPT | Performed by: FAMILY MEDICINE

## 2023-12-13 PROCEDURE — 1036F TOBACCO NON-USER: CPT | Performed by: FAMILY MEDICINE

## 2023-12-13 PROCEDURE — 3074F SYST BP LT 130 MM HG: CPT | Performed by: FAMILY MEDICINE

## 2023-12-13 RX ORDER — HYDROXYCHLOROQUINE SULFATE 200 MG/1
200 TABLET, FILM COATED ORAL DAILY
Qty: 90 TABLET | Refills: 1 | Status: SHIPPED | OUTPATIENT
Start: 2023-12-13

## 2023-12-13 RX ORDER — SERTRALINE HYDROCHLORIDE 50 MG/1
25 TABLET, FILM COATED ORAL DAILY
Qty: 45 TABLET | Refills: 1 | Status: SHIPPED | OUTPATIENT
Start: 2023-12-13

## 2023-12-13 RX ORDER — LEVOTHYROXINE SODIUM 100 UG/1
100 TABLET ORAL DAILY
Qty: 90 TABLET | Refills: 1 | Status: SHIPPED | OUTPATIENT
Start: 2023-12-13

## 2023-12-14 ENCOUNTER — PATIENT OUTREACH (OUTPATIENT)
Dept: CARE COORDINATION | Facility: CLINIC | Age: 88
End: 2023-12-14
Payer: MEDICARE

## 2023-12-14 NOTE — PROGRESS NOTES
Call regarding appt. with PCP on 12/13/2023 after hospitalization.  Spoke with pt son Steve.   At time of outreach call Steve feels as if pt condition has slightly improved since last visit. He states he thinks pt needs more therapy and could have used more time in the hospital.  Steve reports pt is still weak and PCP told them she needs to eat and drink more.   Reviewed the PCP appointment with the pt and addressed any questions or concerns.  Verified next PCP appt 3/7/2024 1420.  Steve denies any questions, needs, or concerns at this time. He is encouraged to call if questions or needs arise.

## 2024-01-05 ENCOUNTER — LAB (OUTPATIENT)
Dept: LAB | Facility: LAB | Age: 89
End: 2024-01-05
Payer: MEDICARE

## 2024-01-05 DIAGNOSIS — R62.7 FAILURE TO THRIVE IN ADULT: ICD-10-CM

## 2024-01-05 DIAGNOSIS — E03.9 ACQUIRED HYPOTHYROIDISM: ICD-10-CM

## 2024-01-05 LAB
ALBUMIN SERPL BCP-MCNC: 4 G/DL (ref 3.4–5)
ALP SERPL-CCNC: 83 U/L (ref 33–136)
ALT SERPL W P-5'-P-CCNC: 7 U/L (ref 7–45)
ANION GAP SERPL CALC-SCNC: 12 MMOL/L (ref 10–20)
AST SERPL W P-5'-P-CCNC: 17 U/L (ref 9–39)
BILIRUB SERPL-MCNC: 0.6 MG/DL (ref 0–1.2)
BUN SERPL-MCNC: 23 MG/DL (ref 6–23)
CALCIUM SERPL-MCNC: 9.1 MG/DL (ref 8.6–10.3)
CHLORIDE SERPL-SCNC: 103 MMOL/L (ref 98–107)
CO2 SERPL-SCNC: 27 MMOL/L (ref 21–32)
CREAT SERPL-MCNC: 1.48 MG/DL (ref 0.5–1.05)
ERYTHROCYTE [DISTWIDTH] IN BLOOD BY AUTOMATED COUNT: 13 % (ref 11.5–14.5)
GFR SERPL CREATININE-BSD FRML MDRD: 33 ML/MIN/1.73M*2
GLUCOSE SERPL-MCNC: 111 MG/DL (ref 74–99)
HCT VFR BLD AUTO: 39.8 % (ref 36–46)
HGB BLD-MCNC: 13.1 G/DL (ref 12–16)
MCH RBC QN AUTO: 32.6 PG (ref 26–34)
MCHC RBC AUTO-ENTMCNC: 32.9 G/DL (ref 32–36)
MCV RBC AUTO: 99 FL (ref 80–100)
NRBC BLD-RTO: 0 /100 WBCS (ref 0–0)
PLATELET # BLD AUTO: 241 X10*3/UL (ref 150–450)
POTASSIUM SERPL-SCNC: 4.2 MMOL/L (ref 3.5–5.3)
PROT SERPL-MCNC: 6.6 G/DL (ref 6.4–8.2)
RBC # BLD AUTO: 4.02 X10*6/UL (ref 4–5.2)
SODIUM SERPL-SCNC: 138 MMOL/L (ref 136–145)
T4 FREE SERPL-MCNC: 1.23 NG/DL (ref 0.61–1.12)
TSH SERPL-ACNC: 0.85 MIU/L (ref 0.44–3.98)
WBC # BLD AUTO: 6.9 X10*3/UL (ref 4.4–11.3)

## 2024-01-05 PROCEDURE — 36415 COLL VENOUS BLD VENIPUNCTURE: CPT

## 2024-01-05 PROCEDURE — 84443 ASSAY THYROID STIM HORMONE: CPT

## 2024-01-05 PROCEDURE — 80053 COMPREHEN METABOLIC PANEL: CPT

## 2024-01-05 PROCEDURE — 85027 COMPLETE CBC AUTOMATED: CPT

## 2024-01-05 PROCEDURE — 84439 ASSAY OF FREE THYROXINE: CPT

## 2024-01-08 ENCOUNTER — TELEPHONE (OUTPATIENT)
Dept: PRIMARY CARE | Facility: CLINIC | Age: 89
End: 2024-01-08
Payer: MEDICARE

## 2024-01-08 NOTE — TELEPHONE ENCOUNTER
----- Message from Zen Alvarez MD sent at 1/8/2024  7:38 AM EST -----  Please inform the patient that her recent labs show that her TSH for thyroid screen was in the normal range.  The rest of her labs were essentially normal or unchanged.  No change in current treatment regimen.

## 2024-01-08 NOTE — RESULT ENCOUNTER NOTE
Please inform the patient that her recent labs show that her TSH for thyroid screen was in the normal range.  The rest of her labs were essentially normal or unchanged.  No change in current treatment regimen.

## 2024-01-10 ENCOUNTER — PATIENT OUTREACH (OUTPATIENT)
Dept: CARE COORDINATION | Facility: CLINIC | Age: 89
End: 2024-01-10
Payer: MEDICARE

## 2024-01-12 DIAGNOSIS — I10 PRIMARY HYPERTENSION: ICD-10-CM

## 2024-01-12 DIAGNOSIS — K21.9 GASTROESOPHAGEAL REFLUX DISEASE WITHOUT ESOPHAGITIS: ICD-10-CM

## 2024-01-12 DIAGNOSIS — G47.00 INSOMNIA, UNSPECIFIED TYPE: ICD-10-CM

## 2024-01-12 DIAGNOSIS — F41.1 GAD (GENERALIZED ANXIETY DISORDER): ICD-10-CM

## 2024-01-12 RX ORDER — AMLODIPINE BESYLATE 5 MG/1
5 TABLET ORAL DAILY
Qty: 90 TABLET | Refills: 1 | Status: SHIPPED | OUTPATIENT
Start: 2024-01-12

## 2024-01-12 RX ORDER — OMEPRAZOLE 40 MG/1
40 CAPSULE, DELAYED RELEASE ORAL DAILY
Qty: 90 CAPSULE | Refills: 1 | Status: SHIPPED | OUTPATIENT
Start: 2024-01-12

## 2024-01-12 RX ORDER — ATENOLOL 25 MG/1
25 TABLET ORAL DAILY
Qty: 90 TABLET | Refills: 1 | Status: SHIPPED | OUTPATIENT
Start: 2024-01-12

## 2024-01-12 RX ORDER — HYDROXYZINE HYDROCHLORIDE 25 MG/1
25 TABLET, FILM COATED ORAL NIGHTLY
Qty: 90 TABLET | Refills: 1 | Status: SHIPPED | OUTPATIENT
Start: 2024-01-12

## 2024-01-16 ENCOUNTER — APPOINTMENT (OUTPATIENT)
Dept: RADIOLOGY | Facility: HOSPITAL | Age: 89
DRG: 522 | End: 2024-01-16
Payer: MEDICARE

## 2024-01-16 ENCOUNTER — APPOINTMENT (OUTPATIENT)
Dept: CARDIOLOGY | Facility: HOSPITAL | Age: 89
DRG: 522 | End: 2024-01-16
Payer: MEDICARE

## 2024-01-16 ENCOUNTER — HOSPITAL ENCOUNTER (INPATIENT)
Facility: HOSPITAL | Age: 89
LOS: 5 days | Discharge: NURSING, REHAB OR LONG-TERM CARE | DRG: 522 | End: 2024-01-21
Attending: STUDENT IN AN ORGANIZED HEALTH CARE EDUCATION/TRAINING PROGRAM | Admitting: INTERNAL MEDICINE
Payer: MEDICARE
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
## 2024-01-16 DIAGNOSIS — R07.89 OTHER CHEST PAIN: ICD-10-CM

## 2024-01-16 DIAGNOSIS — M62.82 NON-TRAUMATIC RHABDOMYOLYSIS: ICD-10-CM

## 2024-01-16 DIAGNOSIS — S22.000D COMPRESSION FRACTURE OF THORACIC VERTEBRA WITH ROUTINE HEALING, UNSPECIFIED THORACIC VERTEBRAL LEVEL, SUBSEQUENT ENCOUNTER: ICD-10-CM

## 2024-01-16 DIAGNOSIS — S72.001A CLOSED FRACTURE OF NECK OF RIGHT FEMUR, INITIAL ENCOUNTER (MULTI): ICD-10-CM

## 2024-01-16 DIAGNOSIS — S72.21XA CLOSED DISPLACED SUBTROCHANTERIC FRACTURE OF RIGHT FEMUR, INITIAL ENCOUNTER (MULTI): ICD-10-CM

## 2024-01-16 DIAGNOSIS — R79.89 ELEVATED TROPONIN: ICD-10-CM

## 2024-01-16 DIAGNOSIS — R07.9 CHEST PAIN, UNSPECIFIED TYPE: ICD-10-CM

## 2024-01-16 DIAGNOSIS — S22.080A COMPRESSION FRACTURE OF T11 VERTEBRA, INITIAL ENCOUNTER (MULTI): ICD-10-CM

## 2024-01-16 LAB
ABO GROUP (TYPE) IN BLOOD: NORMAL
ALBUMIN SERPL BCP-MCNC: 4 G/DL (ref 3.4–5)
ALP SERPL-CCNC: 71 U/L (ref 33–136)
ALT SERPL W P-5'-P-CCNC: 19 U/L (ref 7–45)
ANION GAP SERPL CALC-SCNC: 13 MMOL/L (ref 10–20)
ANTIBODY SCREEN: NORMAL
APPEARANCE UR: ABNORMAL
AST SERPL W P-5'-P-CCNC: 40 U/L (ref 9–39)
BACTERIA #/AREA URNS AUTO: ABNORMAL /HPF
BASOPHILS # BLD AUTO: 0.06 X10*3/UL (ref 0–0.1)
BASOPHILS NFR BLD AUTO: 0.4 %
BILIRUB SERPL-MCNC: 1.5 MG/DL (ref 0–1.2)
BILIRUB UR STRIP.AUTO-MCNC: NEGATIVE MG/DL
BUN SERPL-MCNC: 23 MG/DL (ref 6–23)
CALCIUM SERPL-MCNC: 9.4 MG/DL (ref 8.6–10.3)
CARDIAC TROPONIN I PNL SERPL HS: 133 NG/L (ref 0–13)
CARDIAC TROPONIN I PNL SERPL HS: 153 NG/L (ref 0–13)
CARDIAC TROPONIN I PNL SERPL HS: 157 NG/L (ref 0–13)
CHLORIDE SERPL-SCNC: 106 MMOL/L (ref 98–107)
CK SERPL-CCNC: 678 U/L (ref 0–215)
CO2 SERPL-SCNC: 26 MMOL/L (ref 21–32)
COLOR UR: YELLOW
CREAT SERPL-MCNC: 1.27 MG/DL (ref 0.5–1.05)
EGFRCR SERPLBLD CKD-EPI 2021: 40 ML/MIN/1.73M*2
EOSINOPHIL # BLD AUTO: 0.01 X10*3/UL (ref 0–0.4)
EOSINOPHIL NFR BLD AUTO: 0.1 %
ERYTHROCYTE [DISTWIDTH] IN BLOOD BY AUTOMATED COUNT: 13 % (ref 11.5–14.5)
GLUCOSE SERPL-MCNC: 107 MG/DL (ref 74–99)
GLUCOSE UR STRIP.AUTO-MCNC: NEGATIVE MG/DL
GRAN CASTS #/AREA UR COMP ASSIST: ABNORMAL /LPF
HCT VFR BLD AUTO: 43.3 % (ref 36–46)
HGB BLD-MCNC: 14.5 G/DL (ref 12–16)
HOLD SPECIMEN: NORMAL
IMM GRANULOCYTES # BLD AUTO: 0.07 X10*3/UL (ref 0–0.5)
IMM GRANULOCYTES NFR BLD AUTO: 0.5 % (ref 0–0.9)
INR PPP: 1.2 (ref 0.9–1.1)
KETONES UR STRIP.AUTO-MCNC: NEGATIVE MG/DL
LEUKOCYTE ESTERASE UR QL STRIP.AUTO: NEGATIVE
LYMPHOCYTES # BLD AUTO: 0.56 X10*3/UL (ref 0.8–3)
LYMPHOCYTES NFR BLD AUTO: 3.6 %
MAGNESIUM SERPL-MCNC: 1.81 MG/DL (ref 1.6–2.4)
MCH RBC QN AUTO: 32 PG (ref 26–34)
MCHC RBC AUTO-ENTMCNC: 33.5 G/DL (ref 32–36)
MCV RBC AUTO: 96 FL (ref 80–100)
MONOCYTES # BLD AUTO: 1.09 X10*3/UL (ref 0.05–0.8)
MONOCYTES NFR BLD AUTO: 7 %
NEUTROPHILS # BLD AUTO: 13.69 X10*3/UL (ref 1.6–5.5)
NEUTROPHILS NFR BLD AUTO: 88.4 %
NITRITE UR QL STRIP.AUTO: NEGATIVE
NRBC BLD-RTO: 0.1 /100 WBCS (ref 0–0)
PH UR STRIP.AUTO: 5 [PH]
PLATELET # BLD AUTO: 247 X10*3/UL (ref 150–450)
POTASSIUM SERPL-SCNC: 4 MMOL/L (ref 3.5–5.3)
PROT SERPL-MCNC: 6.5 G/DL (ref 6.4–8.2)
PROT UR STRIP.AUTO-MCNC: ABNORMAL MG/DL
PROTHROMBIN TIME: 13 SECONDS (ref 9.8–12.8)
RBC # BLD AUTO: 4.53 X10*6/UL (ref 4–5.2)
RBC # UR STRIP.AUTO: ABNORMAL /UL
RBC #/AREA URNS AUTO: ABNORMAL /HPF
RH FACTOR (ANTIGEN D): NORMAL
SARS-COV-2 RNA RESP QL NAA+PROBE: NOT DETECTED
SODIUM SERPL-SCNC: 141 MMOL/L (ref 136–145)
SP GR UR STRIP.AUTO: 1.01
SQUAMOUS #/AREA URNS AUTO: ABNORMAL /HPF
UROBILINOGEN UR STRIP.AUTO-MCNC: <2 MG/DL
WBC # BLD AUTO: 15.5 X10*3/UL (ref 4.4–11.3)
WBC #/AREA URNS AUTO: ABNORMAL /HPF

## 2024-01-16 PROCEDURE — 73552 X-RAY EXAM OF FEMUR 2/>: CPT | Mod: RIGHT SIDE | Performed by: RADIOLOGY

## 2024-01-16 PROCEDURE — 99223 1ST HOSP IP/OBS HIGH 75: CPT

## 2024-01-16 PROCEDURE — 36415 COLL VENOUS BLD VENIPUNCTURE: CPT | Performed by: STUDENT IN AN ORGANIZED HEALTH CARE EDUCATION/TRAINING PROGRAM

## 2024-01-16 PROCEDURE — 72170 X-RAY EXAM OF PELVIS: CPT

## 2024-01-16 PROCEDURE — 99285 EMERGENCY DEPT VISIT HI MDM: CPT | Performed by: STUDENT IN AN ORGANIZED HEALTH CARE EDUCATION/TRAINING PROGRAM

## 2024-01-16 PROCEDURE — 99222 1ST HOSP IP/OBS MODERATE 55: CPT | Performed by: ORTHOPAEDIC SURGERY

## 2024-01-16 PROCEDURE — 74176 CT ABD & PELVIS W/O CONTRAST: CPT

## 2024-01-16 PROCEDURE — 2500000001 HC RX 250 WO HCPCS SELF ADMINISTERED DRUGS (ALT 637 FOR MEDICARE OP)

## 2024-01-16 PROCEDURE — 93005 ELECTROCARDIOGRAM TRACING: CPT

## 2024-01-16 PROCEDURE — 72125 CT NECK SPINE W/O DYE: CPT | Performed by: RADIOLOGY

## 2024-01-16 PROCEDURE — 84484 ASSAY OF TROPONIN QUANT: CPT | Performed by: STUDENT IN AN ORGANIZED HEALTH CARE EDUCATION/TRAINING PROGRAM

## 2024-01-16 PROCEDURE — 85025 COMPLETE CBC W/AUTO DIFF WBC: CPT | Performed by: STUDENT IN AN ORGANIZED HEALTH CARE EDUCATION/TRAINING PROGRAM

## 2024-01-16 PROCEDURE — 74176 CT ABD & PELVIS W/O CONTRAST: CPT | Performed by: RADIOLOGY

## 2024-01-16 PROCEDURE — 83735 ASSAY OF MAGNESIUM: CPT | Performed by: STUDENT IN AN ORGANIZED HEALTH CARE EDUCATION/TRAINING PROGRAM

## 2024-01-16 PROCEDURE — 70450 CT HEAD/BRAIN W/O DYE: CPT

## 2024-01-16 PROCEDURE — 72170 X-RAY EXAM OF PELVIS: CPT | Mod: RIGHT SIDE | Performed by: RADIOLOGY

## 2024-01-16 PROCEDURE — 71045 X-RAY EXAM CHEST 1 VIEW: CPT

## 2024-01-16 PROCEDURE — 36415 COLL VENOUS BLD VENIPUNCTURE: CPT

## 2024-01-16 PROCEDURE — 93010 ELECTROCARDIOGRAM REPORT: CPT | Performed by: STUDENT IN AN ORGANIZED HEALTH CARE EDUCATION/TRAINING PROGRAM

## 2024-01-16 PROCEDURE — 73552 X-RAY EXAM OF FEMUR 2/>: CPT | Mod: RT,FY

## 2024-01-16 PROCEDURE — 2500000004 HC RX 250 GENERAL PHARMACY W/ HCPCS (ALT 636 FOR OP/ED): Performed by: STUDENT IN AN ORGANIZED HEALTH CARE EDUCATION/TRAINING PROGRAM

## 2024-01-16 PROCEDURE — 70450 CT HEAD/BRAIN W/O DYE: CPT | Performed by: RADIOLOGY

## 2024-01-16 PROCEDURE — 84075 ASSAY ALKALINE PHOSPHATASE: CPT | Performed by: STUDENT IN AN ORGANIZED HEALTH CARE EDUCATION/TRAINING PROGRAM

## 2024-01-16 PROCEDURE — 85610 PROTHROMBIN TIME: CPT | Performed by: PHYSICIAN ASSISTANT

## 2024-01-16 PROCEDURE — 87635 SARS-COV-2 COVID-19 AMP PRB: CPT

## 2024-01-16 PROCEDURE — 2500000004 HC RX 250 GENERAL PHARMACY W/ HCPCS (ALT 636 FOR OP/ED)

## 2024-01-16 PROCEDURE — 71045 X-RAY EXAM CHEST 1 VIEW: CPT | Performed by: RADIOLOGY

## 2024-01-16 PROCEDURE — 81001 URINALYSIS AUTO W/SCOPE: CPT | Performed by: STUDENT IN AN ORGANIZED HEALTH CARE EDUCATION/TRAINING PROGRAM

## 2024-01-16 PROCEDURE — 1200000002 HC GENERAL ROOM WITH TELEMETRY DAILY

## 2024-01-16 PROCEDURE — 82550 ASSAY OF CK (CPK): CPT | Performed by: STUDENT IN AN ORGANIZED HEALTH CARE EDUCATION/TRAINING PROGRAM

## 2024-01-16 PROCEDURE — 86901 BLOOD TYPING SEROLOGIC RH(D): CPT | Performed by: PHYSICIAN ASSISTANT

## 2024-01-16 PROCEDURE — 72125 CT NECK SPINE W/O DYE: CPT

## 2024-01-16 RX ORDER — DOCUSATE SODIUM 100 MG/1
100 CAPSULE, LIQUID FILLED ORAL 2 TIMES DAILY PRN
Status: DISCONTINUED | OUTPATIENT
Start: 2024-01-16 | End: 2024-01-21 | Stop reason: HOSPADM

## 2024-01-16 RX ORDER — ONDANSETRON HYDROCHLORIDE 2 MG/ML
4 INJECTION, SOLUTION INTRAVENOUS EVERY 6 HOURS PRN
Status: DISCONTINUED | OUTPATIENT
Start: 2024-01-16 | End: 2024-01-17

## 2024-01-16 RX ORDER — MONTELUKAST SODIUM 10 MG/1
10 TABLET ORAL DAILY
COMMUNITY

## 2024-01-16 RX ORDER — AMLODIPINE BESYLATE 5 MG/1
5 TABLET ORAL DAILY
Status: DISCONTINUED | OUTPATIENT
Start: 2024-01-16 | End: 2024-01-21 | Stop reason: HOSPADM

## 2024-01-16 RX ORDER — ATENOLOL 25 MG/1
25 TABLET ORAL DAILY
Status: DISCONTINUED | OUTPATIENT
Start: 2024-01-16 | End: 2024-01-21 | Stop reason: HOSPADM

## 2024-01-16 RX ORDER — DOCUSATE SODIUM 100 MG/1
100 CAPSULE, LIQUID FILLED ORAL 2 TIMES DAILY PRN
COMMUNITY

## 2024-01-16 RX ORDER — ACETAMINOPHEN 500 MG
500 TABLET ORAL EVERY 6 HOURS PRN
COMMUNITY

## 2024-01-16 RX ORDER — SODIUM CHLORIDE 9 MG/ML
75 INJECTION, SOLUTION INTRAVENOUS CONTINUOUS
Status: DISCONTINUED | OUTPATIENT
Start: 2024-01-16 | End: 2024-01-18

## 2024-01-16 RX ORDER — HYDROXYZINE HYDROCHLORIDE 25 MG/1
25 TABLET, FILM COATED ORAL NIGHTLY
Status: DISCONTINUED | OUTPATIENT
Start: 2024-01-16 | End: 2024-01-21 | Stop reason: HOSPADM

## 2024-01-16 RX ORDER — HYDROXYCHLOROQUINE SULFATE 200 MG/1
200 TABLET, FILM COATED ORAL DAILY
Status: DISCONTINUED | OUTPATIENT
Start: 2024-01-17 | End: 2024-01-21 | Stop reason: HOSPADM

## 2024-01-16 RX ORDER — MAGNESIUM SULFATE HEPTAHYDRATE 40 MG/ML
2 INJECTION, SOLUTION INTRAVENOUS ONCE
Status: COMPLETED | OUTPATIENT
Start: 2024-01-16 | End: 2024-01-16

## 2024-01-16 RX ORDER — ENOXAPARIN SODIUM 100 MG/ML
30 INJECTION SUBCUTANEOUS EVERY 24 HOURS
Status: DISCONTINUED | OUTPATIENT
Start: 2024-01-16 | End: 2024-01-17

## 2024-01-16 RX ORDER — LEVOTHYROXINE SODIUM 100 UG/1
100 TABLET ORAL
Status: DISCONTINUED | OUTPATIENT
Start: 2024-01-17 | End: 2024-01-21 | Stop reason: HOSPADM

## 2024-01-16 RX ORDER — PANTOPRAZOLE SODIUM 40 MG/1
40 TABLET, DELAYED RELEASE ORAL
Status: DISCONTINUED | OUTPATIENT
Start: 2024-01-17 | End: 2024-01-21 | Stop reason: HOSPADM

## 2024-01-16 RX ORDER — ACETAMINOPHEN 325 MG/1
650 TABLET ORAL EVERY 6 HOURS PRN
Status: DISCONTINUED | OUTPATIENT
Start: 2024-01-16 | End: 2024-01-21 | Stop reason: HOSPADM

## 2024-01-16 RX ORDER — SERTRALINE HYDROCHLORIDE 25 MG/1
25 TABLET, FILM COATED ORAL DAILY
Status: DISCONTINUED | OUTPATIENT
Start: 2024-01-16 | End: 2024-01-21 | Stop reason: HOSPADM

## 2024-01-16 RX ORDER — MONTELUKAST SODIUM 10 MG/1
10 TABLET ORAL NIGHTLY
Status: DISCONTINUED | OUTPATIENT
Start: 2024-01-16 | End: 2024-01-21 | Stop reason: HOSPADM

## 2024-01-16 RX ORDER — LATANOPROST 50 UG/ML
1 SOLUTION/ DROPS OPHTHALMIC NIGHTLY
Status: DISCONTINUED | OUTPATIENT
Start: 2024-01-16 | End: 2024-01-21 | Stop reason: HOSPADM

## 2024-01-16 RX ORDER — TALC
3 POWDER (GRAM) TOPICAL NIGHTLY PRN
Status: DISCONTINUED | OUTPATIENT
Start: 2024-01-16 | End: 2024-01-21 | Stop reason: HOSPADM

## 2024-01-16 RX ORDER — POLYETHYLENE GLYCOL 3350 17 G/17G
17 POWDER, FOR SOLUTION ORAL DAILY
Status: DISCONTINUED | OUTPATIENT
Start: 2024-01-16 | End: 2024-01-21 | Stop reason: HOSPADM

## 2024-01-16 RX ADMIN — SERTRALINE HYDROCHLORIDE 25 MG: 25 TABLET ORAL at 21:40

## 2024-01-16 RX ADMIN — SODIUM CHLORIDE 75 ML/HR: 9 INJECTION, SOLUTION INTRAVENOUS at 14:10

## 2024-01-16 RX ADMIN — LATANOPROST 1 DROP: 50 SOLUTION OPHTHALMIC at 21:00

## 2024-01-16 RX ADMIN — HYDROXYZINE HYDROCHLORIDE 25 MG: 25 TABLET, FILM COATED ORAL at 21:40

## 2024-01-16 RX ADMIN — MONTELUKAST 10 MG: 10 TABLET, FILM COATED ORAL at 21:40

## 2024-01-16 RX ADMIN — MAGNESIUM SULFATE HEPTAHYDRATE 2 G: 40 INJECTION, SOLUTION INTRAVENOUS at 17:23

## 2024-01-16 RX ADMIN — SODIUM CHLORIDE 75 ML/HR: 9 INJECTION, SOLUTION INTRAVENOUS at 21:47

## 2024-01-16 RX ADMIN — SODIUM CHLORIDE 1000 ML: 9 INJECTION, SOLUTION INTRAVENOUS at 12:54

## 2024-01-16 SDOH — SOCIAL STABILITY: SOCIAL INSECURITY: DOES ANYONE TRY TO KEEP YOU FROM HAVING/CONTACTING OTHER FRIENDS OR DOING THINGS OUTSIDE YOUR HOME?: NO

## 2024-01-16 SDOH — SOCIAL STABILITY: SOCIAL INSECURITY: ARE YOU OR HAVE YOU BEEN THREATENED OR ABUSED PHYSICALLY, EMOTIONALLY, OR SEXUALLY BY ANYONE?: NO

## 2024-01-16 SDOH — SOCIAL STABILITY: SOCIAL INSECURITY: ARE THERE ANY APPARENT SIGNS OF INJURIES/BEHAVIORS THAT COULD BE RELATED TO ABUSE/NEGLECT?: NO

## 2024-01-16 SDOH — SOCIAL STABILITY: SOCIAL INSECURITY: ABUSE: ADULT

## 2024-01-16 SDOH — SOCIAL STABILITY: SOCIAL INSECURITY: HAVE YOU HAD THOUGHTS OF HARMING ANYONE ELSE?: NO

## 2024-01-16 SDOH — SOCIAL STABILITY: SOCIAL INSECURITY: WERE YOU ABLE TO COMPLETE ALL THE BEHAVIORAL HEALTH SCREENINGS?: YES

## 2024-01-16 SDOH — SOCIAL STABILITY: SOCIAL INSECURITY: DO YOU FEEL ANYONE HAS EXPLOITED OR TAKEN ADVANTAGE OF YOU FINANCIALLY OR OF YOUR PERSONAL PROPERTY?: NO

## 2024-01-16 SDOH — SOCIAL STABILITY: SOCIAL INSECURITY: DO YOU FEEL UNSAFE GOING BACK TO THE PLACE WHERE YOU ARE LIVING?: NO

## 2024-01-16 SDOH — SOCIAL STABILITY: SOCIAL INSECURITY: HAS ANYONE EVER THREATENED TO HURT YOUR FAMILY OR YOUR PETS?: NO

## 2024-01-16 ASSESSMENT — ACTIVITIES OF DAILY LIVING (ADL)
LACK_OF_TRANSPORTATION: NO
PATIENT'S MEMORY ADEQUATE TO SAFELY COMPLETE DAILY ACTIVITIES?: NO
GROOMING: NEEDS ASSISTANCE
TOILETING: NEEDS ASSISTANCE
FEEDING YOURSELF: INDEPENDENT
HEARING - RIGHT EAR: DIFFICULTY WITH NOISE
JUDGMENT_ADEQUATE_SAFELY_COMPLETE_DAILY_ACTIVITIES: YES
WALKS IN HOME: NEEDS ASSISTANCE
HEARING - LEFT EAR: DIFFICULTY WITH NOISE
BATHING: NEEDS ASSISTANCE
DRESSING YOURSELF: NEEDS ASSISTANCE
ADEQUATE_TO_COMPLETE_ADL: YES
ASSISTIVE_DEVICE: WALKER

## 2024-01-16 ASSESSMENT — COGNITIVE AND FUNCTIONAL STATUS - GENERAL
DRESSING REGULAR LOWER BODY CLOTHING: A LITTLE
PERSONAL GROOMING: A LITTLE
TOILETING: A LITTLE
WALKING IN HOSPITAL ROOM: A LITTLE
DAILY ACTIVITIY SCORE: 18
HELP NEEDED FOR BATHING: A LITTLE
DRESSING REGULAR UPPER BODY CLOTHING: A LITTLE
CLIMB 3 TO 5 STEPS WITH RAILING: A LITTLE
STANDING UP FROM CHAIR USING ARMS: A LITTLE
PATIENT BASELINE BEDBOUND: NO
MOVING TO AND FROM BED TO CHAIR: A LITTLE
MOVING FROM LYING ON BACK TO SITTING ON SIDE OF FLAT BED WITH BEDRAILS: A LITTLE
EATING MEALS: A LITTLE
MOBILITY SCORE: 18
TURNING FROM BACK TO SIDE WHILE IN FLAT BAD: A LITTLE

## 2024-01-16 ASSESSMENT — PAIN DESCRIPTION - ORIENTATION: ORIENTATION: RIGHT

## 2024-01-16 ASSESSMENT — ENCOUNTER SYMPTOMS
JOINT SWELLING: 1
MYALGIAS: 1

## 2024-01-16 ASSESSMENT — LIFESTYLE VARIABLES
HAVE PEOPLE ANNOYED YOU BY CRITICIZING YOUR DRINKING: NO
HOW OFTEN DO YOU HAVE 6 OR MORE DRINKS ON ONE OCCASION: NEVER
EVER HAD A DRINK FIRST THING IN THE MORNING TO STEADY YOUR NERVES TO GET RID OF A HANGOVER: NO
HOW MANY STANDARD DRINKS CONTAINING ALCOHOL DO YOU HAVE ON A TYPICAL DAY: PATIENT DOES NOT DRINK
REASON UNABLE TO ASSESS: NO
AUDIT-C TOTAL SCORE: 0
HOW OFTEN DO YOU HAVE A DRINK CONTAINING ALCOHOL: NEVER
EVER FELT BAD OR GUILTY ABOUT YOUR DRINKING: NO
HAVE YOU EVER FELT YOU SHOULD CUT DOWN ON YOUR DRINKING: NO
SKIP TO QUESTIONS 9-10: 1
AUDIT-C TOTAL SCORE: 0

## 2024-01-16 ASSESSMENT — COLUMBIA-SUICIDE SEVERITY RATING SCALE - C-SSRS
6. HAVE YOU EVER DONE ANYTHING, STARTED TO DO ANYTHING, OR PREPARED TO DO ANYTHING TO END YOUR LIFE?: NO
1. IN THE PAST MONTH, HAVE YOU WISHED YOU WERE DEAD OR WISHED YOU COULD GO TO SLEEP AND NOT WAKE UP?: NO
2. HAVE YOU ACTUALLY HAD ANY THOUGHTS OF KILLING YOURSELF?: NO

## 2024-01-16 ASSESSMENT — PATIENT HEALTH QUESTIONNAIRE - PHQ9
2. FEELING DOWN, DEPRESSED OR HOPELESS: SEVERAL DAYS
1. LITTLE INTEREST OR PLEASURE IN DOING THINGS: SEVERAL DAYS
SUM OF ALL RESPONSES TO PHQ9 QUESTIONS 1 & 2: 2

## 2024-01-16 ASSESSMENT — PAIN DESCRIPTION - LOCATION: LOCATION: HIP

## 2024-01-16 ASSESSMENT — PAIN - FUNCTIONAL ASSESSMENT
PAIN_FUNCTIONAL_ASSESSMENT: 0-10
PAIN_FUNCTIONAL_ASSESSMENT: 0-10

## 2024-01-16 ASSESSMENT — PAIN SCALES - GENERAL
PAINLEVEL_OUTOF10: 7
PAINLEVEL_OUTOF10: 4

## 2024-01-16 ASSESSMENT — PAIN DESCRIPTION - DESCRIPTORS: DESCRIPTORS: DISCOMFORT

## 2024-01-16 NOTE — HOSPITAL COURSE
89-year-old female with past medical history of esophageal stricture, vocal cord dysfunction, GERD, CKD stage III, hypothyroidism, hypertension, hyperlipidemia presented after unwitnessed fall and dizziness.  Patient was found to have T11 fracture along with right hip fracture.  Patient was taken hemiarthroplasty by Dr. Jeremy Parry.  Patient tolerated the procedure well.  Orthopedic surgery evaluated patient and recommended discharge to skilled nursing facility based on obesity recommendation.  Patient was constipated and was having urinary retention because of the.  Once constipation resolved she is voiding appropriately.  Will place patient on DVT prophylaxis with aspirin 81 mg twice daily for 30 days for DVT prophylaxis.    39 minutes spent in discharge timing

## 2024-01-16 NOTE — CONSULTS
Consult Note  Patient: Fadumo Corona  Unit/Bed: 4103/4103-A  YOB: 1932  MRN: [id number]  Acct: [id number]   Admitting Diagnosis: Elevated troponin [R79.89]  Fall, initial encounter [W19.XXXA]  Non-traumatic rhabdomyolysis [M62.82]  Date:  1/16/2024  Hospital Day: 0    Complaint:  Right hip pain status post fall    History of Present Illness:  89-year-old female fell injuring her right hip resulting in displaced of femoral neck fracture.  Patient is admitted the hospital.  Partly suggestions family is at bedside aids in history physical and decision making.  Please kabat a moderate aching pain soreness in the right hip.  Denies injuries to the major extremities.  No loss conscious during her fall.  Denies head neck or back trauma    PMHx:  Past Medical History:   Diagnosis Date    Chest pain 03/23/2009    Distal radius fracture, left 03/26/2023 12/11/2020? Caused by a fall at home. Managed by Dr. Hayes     She is no longer the cast and has slowly been regaining strength and function    Forehead contusion 03/31/2023    Fungal nail infection 03/26/2023    Hand joint pain 06/08/2009    Hyperglycemia 03/26/2023    Keratosis, inflamed seborrheic 03/26/2023    Low back pain 11/10/2008    Pain in joint, ankle and foot 01/05/2016    Pain in joint, lower leg 05/31/2006    Pain in limb 11/10/2008       PSHx:  Past Surgical History:   Procedure Laterality Date    FOOT SURGERY  08/29/2013    Foot Surgery    OTHER SURGICAL HISTORY  08/18/2020    Cataract surgery    OTHER SURGICAL HISTORY  10/16/2019    Cholecystectomy    OTHER SURGICAL HISTORY  10/16/2019    Hysterectomy    TOTAL KNEE ARTHROPLASTY  08/29/2013    Knee Replacement       Social Hx:  Social History     Socioeconomic History    Marital status:      Spouse name: Not on file    Number of children: Not on file    Years of education: Not on file    Highest education level: Not on file   Occupational History    Not on file   Tobacco Use     Smoking status: Never     Passive exposure: Never    Smokeless tobacco: Never   Substance and Sexual Activity    Alcohol use: Not Currently    Drug use: Never    Sexual activity: Not on file   Other Topics Concern    Not on file   Social History Narrative    Not on file     Social Determinants of Health     Financial Resource Strain: Patient Unable To Answer (1/16/2024)    Overall Financial Resource Strain (CARDIA)     Difficulty of Paying Living Expenses: Patient unable to answer   Food Insecurity: Not on file   Transportation Needs: No Transportation Needs (1/16/2024)    PRAPARE - Transportation     Lack of Transportation (Medical): No     Lack of Transportation (Non-Medical): No   Physical Activity: Not on file   Stress: Not on file   Social Connections: Not on file   Intimate Partner Violence: Not on file   Housing Stability: Unknown (1/16/2024)    Housing Stability Vital Sign     Unable to Pay for Housing in the Last Year: Patient unable to answer     Number of Places Lived in the Last Year: 1     Unstable Housing in the Last Year: No       Family Hx:  Family History   Problem Relation Name Age of Onset    Heart attack Mother          Acute    Heart failure Father      Leukemia Daughter         Review of Systems:   Review of Systems   Musculoskeletal:  Positive for gait problem, joint swelling and myalgias.   All other systems reviewed and are negative.        Physical Examination:    Visit Vitals  /65 (BP Location: Right arm, Patient Position: Lying)   Pulse 65   Temp 36.6 °C (97.9 °F) (Temporal)   Resp 16      Physical Exam  Vitals and nursing note reviewed.   HENT:      Head: Atraumatic.   Eyes:      Extraocular Movements: Extraocular movements intact.   Cardiovascular:      Rate and Rhythm: Normal rate.   Pulmonary:      Effort: Pulmonary effort is normal.   Abdominal:      General: Abdomen is flat.   Musculoskeletal:      Cervical back: Normal range of motion.   Skin:     General: Skin is warm.       Capillary Refill: Capillary refill takes less than 2 seconds.   Neurological:      General: No focal deficit present.      Mental Status: She is alert.   Psychiatric:         Mood and Affect: Mood normal.     Right lower extremity: Neurovascularly intact.  Palpable pulse, warm well-perfused skin intact.  Hip pain with logroll or any motion.  Left lower extremity and bilateral upper EXTR and she will place motion, no pain or deformity.    LABS:  CBC:   Lab Results   Component Value Date    WBC 15.5 (H) 01/16/2024    RBC 4.53 01/16/2024    HGB 14.5 01/16/2024    HCT 43.3 01/16/2024    MCV 96 01/16/2024    MCH 32.0 01/16/2024    MCHC 33.5 01/16/2024    RDW 13.0 01/16/2024     01/16/2024     CBC with Differential:    Lab Results   Component Value Date    WBC 15.5 (H) 01/16/2024    RBC 4.53 01/16/2024    HGB 14.5 01/16/2024    HCT 43.3 01/16/2024     01/16/2024    MCV 96 01/16/2024    MCH 32.0 01/16/2024    MCHC 33.5 01/16/2024    RDW 13.0 01/16/2024    NRBC 0.1 (H) 01/16/2024    LYMPHOPCT 3.6 01/16/2024    MONOPCT 7.0 01/16/2024    EOSPCT 0.1 01/16/2024    BASOPCT 0.4 01/16/2024    MONOSABS 1.09 (H) 01/16/2024    LYMPHSABS 0.56 (L) 01/16/2024    EOSABS 0.01 01/16/2024    BASOSABS 0.06 01/16/2024     CMP:    Lab Results   Component Value Date     01/16/2024    K 4.0 01/16/2024     01/16/2024    CO2 26 01/16/2024    BUN 23 01/16/2024    CREATININE 1.27 (H) 01/16/2024    GLUCOSE 107 (H) 01/16/2024    PROT 6.5 01/16/2024    CALCIUM 9.4 01/16/2024    BILITOT 1.5 (H) 01/16/2024    ALKPHOS 71 01/16/2024    AST 40 (H) 01/16/2024    ALT 19 01/16/2024     BMP:    Lab Results   Component Value Date     01/16/2024    K 4.0 01/16/2024     01/16/2024    CO2 26 01/16/2024    BUN 23 01/16/2024    CREATININE 1.27 (H) 01/16/2024    CALCIUM 9.4 01/16/2024    GLUCOSE 107 (H) 01/16/2024     Magnesium:  Lab Results   Component Value Date    MG 1.81 01/16/2024     Troponin:  No results found for:  "\"TROPONINI\"    X-ray: Displaced right femoral neck fracture with shortening    Assessment:    Displaced right femoral neck fracture  Plan:  Recommended a right hip hemiarthroplasty for fracture  Plan: Hip arthroplasty for fracture    Risks benefits and alternatives to surgery were discussed including but not limited to Infection, bleeding, neurovascular injury, pain and dysfunction, hardware related complications including cutout failure breakage, loss of function, motion, and permanent disability as well as the cardiovascular and pulmonary complications from anesthesia including death and DVT. Patient and family accept these risks.  We discussed specifically hardware complications going cut out, failure, breakage, instability, dislocation, persistent pain, limp, loss in strength/function/motion, and the need for future revision surgeries including revision arthroplasty  Medical clearance for surgery            Electronically signed by Fly Sagastume MD on 1/16/2024 at 5:51 PM  "

## 2024-01-16 NOTE — ED PROVIDER NOTES
EMERGENCY DEPARTMENT ENCOUNTER      Pt Name: Fadumo Corona  MRN: [id number]  Birthdate 11/20/1932  Date of evaluation: 1/16/2024  Provider: Sabine Husain DO    CHIEF COMPLAINT       Chief Complaint   Patient presents with    Fall     Right hip injury         HISTORY OF PRESENT ILLNESS    HPI  89-year-old female presents emergency room via EMS after sustaining a fall.  Patient indicates that she was attempting to hang wet laundry but was unable to hold it and fell.  She fell yesterday and was able to crawl but unable to get up off the ground since yesterday.  She does indicate hitting her head but is unsure if she lost consciousness.  Patient endorses right hip pain and is unable to walk she also endorses abdominal pain.  She is alert and oriented x 3 denies any focal headache. Denies fevers, chills, night sweats, chest pain, SOB.     Nursing Notes were reviewed.    PAST MEDICAL HISTORY     Past Medical History:   Diagnosis Date    Chest pain 03/23/2009    Distal radius fracture, left 03/26/2023 12/11/2020? Caused by a fall at home. Managed by Dr. Hayes     She is no longer the cast and has slowly been regaining strength and function    Forehead contusion 03/31/2023    Fungal nail infection 03/26/2023    Hand joint pain 06/08/2009    Hyperglycemia 03/26/2023    Keratosis, inflamed seborrheic 03/26/2023    Low back pain 11/10/2008    Pain in joint, ankle and foot 01/05/2016    Pain in joint, lower leg 05/31/2006    Pain in limb 11/10/2008         SURGICAL HISTORY       Past Surgical History:   Procedure Laterality Date    FOOT SURGERY  08/29/2013    Foot Surgery    OTHER SURGICAL HISTORY  08/18/2020    Cataract surgery    OTHER SURGICAL HISTORY  10/16/2019    Cholecystectomy    OTHER SURGICAL HISTORY  10/16/2019    Hysterectomy    TOTAL KNEE ARTHROPLASTY  08/29/2013    Knee Replacement         CURRENT MEDICATIONS       Current Discharge Medication List        CONTINUE these medications which have NOT CHANGED     Details   acetaminophen (Tylenol) 500 mg tablet Take 1 tablet (500 mg) by mouth every 6 hours if needed for mild pain (1 - 3).      amLODIPine (Norvasc) 5 mg tablet Take 1 tablet (5 mg) by mouth once daily. As directed  Qty: 90 tablet, Refills: 1    Associated Diagnoses: Primary hypertension      atenolol (Tenormin) 25 mg tablet Take 1 tablet (25 mg) by mouth once daily.  Qty: 90 tablet, Refills: 1    Associated Diagnoses: Primary hypertension      docusate sodium (Colace) 100 mg capsule Take 1 capsule (100 mg) by mouth 2 times a day as needed for constipation.      hydroxychloroquine (Plaquenil) 200 mg tablet Take 1 tablet (200 mg) by mouth once daily.  Qty: 90 tablet, Refills: 1    Associated Diagnoses: Collagen vascular disease (CMS/HCC)      hydrOXYzine HCL (Atarax) 25 mg tablet Take 1 tablet (25 mg) by mouth once daily at bedtime.  Qty: 90 tablet, Refills: 1    Associated Diagnoses: Insomnia, unspecified type      latanoprost (Xalatan) 0.005 % ophthalmic solution Administer 1 drop into both eyes once daily at bedtime.      levothyroxine (Synthroid, Levoxyl) 100 mcg tablet Take 1 tablet (100 mcg) by mouth once daily.  Qty: 90 tablet, Refills: 1    Associated Diagnoses: Acquired hypothyroidism      montelukast (Singulair) 10 mg tablet Take 1 tablet (10 mg) by mouth once daily.      omeprazole (PriLOSEC) 40 mg DR capsule Take 1 capsule (40 mg) by mouth once daily.  Qty: 90 capsule, Refills: 1    Associated Diagnoses: Gastroesophageal reflux disease without esophagitis      sertraline (Zoloft) 50 mg tablet Take 0.5 tablets (25 mg) by mouth once daily.  Qty: 45 tablet, Refills: 1    Associated Diagnoses: UMAIR (generalized anxiety disorder)             ALLERGIES     Bee venom protein (honey bee) and Penicillins    FAMILY HISTORY       Family History   Problem Relation Name Age of Onset    Heart attack Mother          Acute    Heart failure Father      Leukemia Daughter            SOCIAL HISTORY       Social History      Socioeconomic History    Marital status:      Spouse name: None    Number of children: None    Years of education: None    Highest education level: None   Occupational History    None   Tobacco Use    Smoking status: Never     Passive exposure: Never    Smokeless tobacco: Never   Substance and Sexual Activity    Alcohol use: Not Currently    Drug use: Never    Sexual activity: None   Other Topics Concern    None   Social History Narrative    None     Social Determinants of Health     Financial Resource Strain: Patient Unable To Answer (1/16/2024)    Overall Financial Resource Strain (CARDIA)     Difficulty of Paying Living Expenses: Patient unable to answer   Food Insecurity: Not on file   Transportation Needs: No Transportation Needs (1/16/2024)    PRAPARE - Transportation     Lack of Transportation (Medical): No     Lack of Transportation (Non-Medical): No   Physical Activity: Not on file   Stress: Not on file   Social Connections: Not on file   Intimate Partner Violence: Not on file   Housing Stability: Unknown (1/16/2024)    Housing Stability Vital Sign     Unable to Pay for Housing in the Last Year: Patient unable to answer     Number of Places Lived in the Last Year: 1     Unstable Housing in the Last Year: No       SCREENINGS                        PHYSICAL EXAM    (up to 7 for level 4, 8 or more for level 5)     ED Triage Vitals [01/16/24 0956]   Temp Heart Rate Resp BP   36.3 °C (97.3 °F) 66 16 155/74      SpO2 Temp src Heart Rate Source Patient Position   95 % -- -- --      BP Location FiO2 (%)     -- --       Physical Exam  Constitutional:       Appearance: Normal appearance. She is normal weight.   HENT:      Head: Normocephalic and atraumatic.      Right Ear: External ear normal.      Left Ear: External ear normal.      Nose: Nose normal.      Mouth/Throat:      Mouth: Mucous membranes are moist.   Eyes:      Extraocular Movements: Extraocular movements intact.      Pupils: Pupils are equal,  round, and reactive to light.   Cardiovascular:      Rate and Rhythm: Normal rate and regular rhythm.   Pulmonary:      Effort: Pulmonary effort is normal.      Breath sounds: Normal breath sounds.   Abdominal:      General: Abdomen is flat. Bowel sounds are normal. There is no distension.      Palpations: Abdomen is soft.      Tenderness: There is abdominal tenderness. There is no guarding or rebound.   Musculoskeletal:      Cervical back: Normal range of motion and neck supple.        Legs:       Comments: Patient's right foot is abducted and internally rotated.  It appears shorter than the left.  Patient is currently in a pelvic binder. Does have some right hip tenderness to palpation.   Skin:     General: Skin is warm.      Capillary Refill: Capillary refill takes less than 2 seconds.   Neurological:      General: No focal deficit present.      Mental Status: She is alert and oriented to person, place, and time.   Psychiatric:         Mood and Affect: Mood normal.         Behavior: Behavior normal.          DIAGNOSTIC RESULTS     LABS:  Labs Reviewed   CBC WITH AUTO DIFFERENTIAL - Abnormal       Result Value    WBC 15.5 (*)     nRBC 0.1 (*)     RBC 4.53      Hemoglobin 14.5      Hematocrit 43.3      MCV 96      MCH 32.0      MCHC 33.5      RDW 13.0      Platelets 247      Neutrophils % 88.4      Immature Granulocytes %, Automated 0.5      Lymphocytes % 3.6      Monocytes % 7.0      Eosinophils % 0.1      Basophils % 0.4      Neutrophils Absolute 13.69 (*)     Immature Granulocytes Absolute, Automated 0.07      Lymphocytes Absolute 0.56 (*)     Monocytes Absolute 1.09 (*)     Eosinophils Absolute 0.01      Basophils Absolute 0.06     SERIAL TROPONIN-INITIAL - Abnormal    Troponin I, High Sensitivity 133 (*)     Narrative:     Less than 99th percentile of normal range cutoff-  Female and children under 18 years old <14 ng/L; Male <21 ng/L: Negative  Repeat testing should be performed if clinically indicated.      Female and children under 18 years old 14-50 ng/L; Male 21-50 ng/L:  Consistent with possible cardiac damage and possible increased clinical   risk. Serial measurements may help to assess extent of myocardial damage.     >50 ng/L: Consistent with cardiac damage, increased clinical risk and  myocardial infarction. Serial measurements may help assess extent of   myocardial damage.      NOTE: Children less than 1 year old may have higher baseline troponin   levels and results should be interpreted in conjunction with the overall   clinical context.     NOTE: Troponin I testing is performed using a different   testing methodology at [facility] than at other   [facility]. Direct result comparisons should only   be made within the same method.   URINALYSIS WITH REFLEX CULTURE AND MICROSCOPIC - Abnormal    Color, Urine Yellow      Appearance, Urine Hazy (*)     Specific Gravity, Urine 1.015      pH, Urine 5.0      Protein, Urine 30 (1+) (*)     Glucose, Urine NEGATIVE      Blood, Urine MODERATE (2+) (*)     Ketones, Urine NEGATIVE      Bilirubin, Urine NEGATIVE      Urobilinogen, Urine <2.0      Nitrite, Urine NEGATIVE      Leukocyte Esterase, Urine NEGATIVE     SERIAL TROPONIN, 1 HOUR - Abnormal    Troponin I, High Sensitivity 153 (*)     Narrative:     Less than 99th percentile of normal range cutoff-  Female and children under 18 years old <14 ng/L; Male <21 ng/L: Negative  Repeat testing should be performed if clinically indicated.     Female and children under 18 years old 14-50 ng/L; Male 21-50 ng/L:  Consistent with possible cardiac damage and possible increased clinical   risk. Serial measurements may help to assess extent of myocardial damage.     >50 ng/L: Consistent with cardiac damage, increased clinical risk and  myocardial infarction. Serial measurements may help assess extent of   myocardial damage.      NOTE: Children less than 1 year old may have higher baseline troponin   levels  and results should be interpreted in conjunction with the overall   clinical context.     NOTE: Troponin I testing is performed using a different   testing methodology at [facility] than at other   [facility] [place]. Direct result comparisons should only   be made within the same method.   CREATINE KINASE - Abnormal    Creatine Kinase 678 (*)    COMPREHENSIVE METABOLIC PANEL - Abnormal    Glucose 107 (*)     Sodium 141      Potassium 4.0      Chloride 106      Bicarbonate 26      Anion Gap 13      Urea Nitrogen 23      Creatinine 1.27 (*)     eGFR 40 (*)     Calcium 9.4      Albumin 4.0      Alkaline Phosphatase 71      Total Protein 6.5      AST 40 (*)     Bilirubin, Total 1.5 (*)     ALT 19     TROPONIN I, HIGH SENSITIVITY - Abnormal    Troponin I, High Sensitivity 157 (*)     Narrative:     Less than 99th percentile of normal range cutoff-  Female and children under 18 years old <14 ng/L; Male <21 ng/L: Negative  Repeat testing should be performed if clinically indicated.     Female and children under 18 years old 14-50 ng/L; Male 21-50 ng/L:  Consistent with possible cardiac damage and possible increased clinical   risk. Serial measurements may help to assess extent of myocardial damage.     >50 ng/L: Consistent with cardiac damage, increased clinical risk and  myocardial infarction. Serial measurements may help assess extent of   myocardial damage.      NOTE: Children less than 1 year old may have higher baseline troponin   levels and results should be interpreted in conjunction with the overall   clinical context.     NOTE: Troponin I testing is performed using a different   testing methodology at [facility] than at other   [facility]. Direct result comparisons should only   be made within the same method.   PROTIME-INR - Abnormal    Protime 13.0 (*)     INR 1.2 (*)    CBC - Abnormal    WBC 8.2      nRBC 0.0      RBC 3.33 (*)     Hemoglobin 10.6 (*)     Hematocrit 33.2 (*)            MCH 31.8      MCHC 31.9 (*)     RDW 13.2      Platelets 189     RENAL FUNCTION PANEL - Abnormal    Glucose 94      Sodium 141      Potassium 3.3 (*)     Chloride 109 (*)     Bicarbonate 21      Anion Gap 14      Urea Nitrogen 24 (*)     Creatinine 1.26 (*)     eGFR 40 (*)     Calcium 8.5 (*)     Phosphorus 3.3      Albumin 3.3 (*)    CREATINE KINASE - Abnormal    Creatine Kinase 456 (*)    TROPONIN I, HIGH SENSITIVITY - Abnormal    Troponin I, High Sensitivity 210 (*)     Narrative:     Less than 99th percentile of normal range cutoff-  Female and children under 18 years old <14 ng/L; Male <21 ng/L: Negative  Repeat testing should be performed if clinically indicated.     Female and children under 18 years old 14-50 ng/L; Male 21-50 ng/L:  Consistent with possible cardiac damage and possible increased clinical   risk. Serial measurements may help to assess extent of myocardial damage.     >50 ng/L: Consistent with cardiac damage, increased clinical risk and  myocardial infarction. Serial measurements may help assess extent of   myocardial damage.      NOTE: Children less than 1 year old may have higher baseline troponin   levels and results should be interpreted in conjunction with the overall   clinical context.     NOTE: Troponin I testing is performed using a different   testing methodology at [facility] than at other   [facility]. Direct result comparisons should only   be made within the same method.   URINALYSIS MICROSCOPIC WITH REFLEX CULTURE - Abnormal    WBC, Urine 1-5      RBC, Urine 6-10 (*)     Squamous Epithelial Cells, Urine 1-9 (SPARSE)      Bacteria, Urine 1+ (*)     Fine Granular Casts, Urine OCCASIONAL (*)    MAGNESIUM - Normal    Magnesium 1.81     SARS-COV-2 PCR, SCREEN ASYMPTOMATIC - Normal    Coronavirus 2019, PCR Not Detected      Narrative:     This assay has received FDA Emergency Use Authorization (EUA) and is only authorized for the duration of time that  circumstances exist to justify the authorization of the emergency use of in vitro diagnostic tests for the detection of SARS-CoV-2 virus and/or diagnosis of COVID-19 infection under section 564(b)(1) of the Act, 21 U.S.C. 360bbb-3(b)(1). This assay is an in vitro diagnostic nucleic acid amplification test for the qualitative detection of SARS-CoV-2 from nasopharyngeal specimens and has been validated for use at [facility]. Negative results do not preclude COVID-19 infections and should not be used as the sole basis for diagnosis, treatment, or other management decisions.     URINE CULTURE   TROPONIN SERIES- (INITIAL, 1 HR)    Narrative:     The following orders were created for panel order Troponin I Series, High Sensitivity (0, 1 HR).  Procedure                               Abnormality         Status                     ---------                               -----------         ------                     Troponin I, High Sensiti...[[id number]]  Abnormal            Final result               Troponin, High Sensitivi...[[id number]]  Abnormal            Final result                 Please view results for these tests on the individual orders.   URINALYSIS WITH REFLEX CULTURE AND MICROSCOPIC    Narrative:     The following orders were created for panel order Urinalysis with Reflex Culture and Microscopic.  Procedure                               Abnormality         Status                     ---------                               -----------         ------                     Urinalysis with Reflex C...[[id number]]  Abnormal            Final result               Extra Urine Gray Tube[[id number]]                            Final result                 Please view results for these tests on the individual orders.   EXTRA URINE GRAY TUBE    Extra Tube Hold for add-ons.     TYPE AND SCREEN    ABO TYPE A      Rh TYPE NEG      ANTIBODY SCREEN NEG     CBC   RENAL FUNCTION PANEL       All other labs were  within normal range or not returned as of this dictation.    Imaging  Transthoracic Echo (TTE) Complete   Final Result      XR femur right 2+ views   Final Result   Acute appearing fracture of the femoral neck.             MACRO:   None        Signed by: Zay Selby 1/16/2024 12:42 PM   Dictation workstation:   [id number]      XR pelvis 1-2 views   Final Result   Acute appearing fracture of the femoral neck.             MACRO:   None        Signed by: Zay Selby 1/16/2024 12:42 PM   Dictation workstation:   [id number]      XR chest 1 view   Final Result   1.  No evidence of acute cardiopulmonary process.                  MACRO:   None        Signed by: Cortez Horowitz 1/16/2024 11:47 AM   Dictation workstation:   [id number]      CT head wo IV contrast   Final Result   No evidence of acute cortical infarct or intracranial hemorrhage.        Stable chronic changes.        MACRO:   None        Signed by: Cortez Horowitz 1/16/2024 11:45 AM   Dictation workstation:   [id number]      CT cervical spine wo IV contrast   Final Result   No evidence for an acute fracture or subluxation of the cervical   spine. Discogenic degenerative changes.        MACRO:   None        Signed by: Cortez Horowitz 1/16/2024 11:51 AM   Dictation workstation:   [id number]      CT abdomen pelvis wo IV contrast   Final Result   Significant compression fracture deformity of T11 vertebral body of   indeterminate age. This may be acute. There is associated   retropulsion. Consider MRI to better evaluate central canal and   neural foraminal as clinically warranted.        Displaced right femoral neck fracture of indeterminate age. This may   be acute.        Small to moderate hiatal hernia.        Prominence of the common bile duct could be due to prior   cholecystectomy. Correlate with clinical and laboratory findings.        Asymmetrically smaller right kidney.        Sigmoid diverticulosis with question of minimal fat stranding. Can   exclude minimal  acute diverticulitis. No free fluid or abscess.             MACRO:   None        Signed by: Luistiomeena Chauveronica 1/16/2024 11:59 AM   Dictation workstation:   [id number]           Procedures  Procedures     EMERGENCY DEPARTMENT COURSE/MDM:     ED Course as of 01/17/24 2348   Tue Jan 16, 2024   1157 EKG independently interpreted by attending physician    1043 hrs.: Sinus rhythm with PACs with ventricular rate of 66 bpm.  QTc 492.  .  Incomplete right bundle branch block.  LVH.  No acute injury pattern seen. [AI]   1157 Attending note    89-year-old female presents after a fall.  Was found down on the floor.  States that she fell at her assisted living yesterday around 1930 hrs.  She states that she had difficulty getting up and today crawled outside of her bathroom.  EMS was called and brought her to the ER for further evaluation.   [AI]   1303 XR femur right 2+ views [DS]   1321 EKG independently interpreted by attending physician    1239 hrs.: Normal sinus rhythm with ventricular rate of 62 bpm.  QTc 475.  QRS 98.  Incomplete right bundle branch block.  LVH.  No acute injury pattern seen. [AI]      ED Course User Index  [AI] Sabine Husain DO  [DS] Clay Gilbert MD         Diagnoses as of 01/17/24 2348   Fall, initial encounter   Non-traumatic rhabdomyolysis   Elevated troponin   Closed fracture of neck of right femur, initial encounter (CMS/HCC)   Compression fracture of T11 vertebra, initial encounter (CMS/HCC)        Medical Decision Making  89-year-old female presents emergency room with chief complaint of a fall.  Nontoxic appearing female, in no acute distress. Medical management treatment in emergency room will consist of CT of the abdomen and pelvis without IV contrast as well as CT of the head and C-spine without IV contrast.  We will also be conducting a chest x-ray and x-rays of the lower extremities to evaluate for possible fracture of the femoral neck on the right side.  Lab work will include CBC, CMP  and also creatinine kinase given concern for rhabdomyolysis.     Imaging findings include-  Acute appearing fracture of the femoral neck.  Significant compression fracture deformity of T11 vertebral body of  indeterminate age. This may be acute. There is associated  retropulsion. Consider MRI to better evaluate central canal and  neural foraminal as clinically warranted.  Conversation was had with orthopedics, Dr. Parry they indicated that they would follow the patient for possible surgical intervention of the hip.  Conversation was also had with neurosurgery they indicated that the fracture of the vertebral body could possibly be old however they will evaluate either today or tomorrow morning.  The patient will be admitted to internal medicine for the rhabdo and continued workup.          Patient and or family in agreement and understanding of treatment plan.  All questions answered.      I reviewed the case with the attending ED physician. The attending ED physician agrees with the plan. Patient and/or patient´s representative was counseled regarding labs, imaging, likely diagnosis, and plan. All questions were answered.    ED Medications administered this visit:    Medications   acetaminophen (Tylenol) tablet 650 mg (has no administration in time range)   sodium chloride 0.9% infusion (75 mL/hr intravenous Rate/Dose Verify 1/17/24 0305)   HYDROmorphone PF (Dilaudid) injection 0.2 mg (has no administration in time range)   melatonin tablet 3 mg (has no administration in time range)   amLODIPine (Norvasc) tablet 5 mg (5 mg oral Not Given 1/17/24 0900)   atenolol (Tenormin) tablet 25 mg (25 mg oral Given 1/17/24 0957)   docusate sodium (Colace) capsule 100 mg (has no administration in time range)   hydroxychloroquine (Plaquenil) tablet 200 mg (200 mg oral Not Given 1/17/24 0900)   hydrOXYzine HCL (Atarax) tablet 25 mg (25 mg oral Given 1/17/24 2028)   latanoprost (Xalatan) 0.005 % ophthalmic solution 1 drop (1  drop Both Eyes Given 1/17/24 2100)   levothyroxine (Synthroid, Levoxyl) tablet 100 mcg (100 mcg oral Given 1/17/24 0606)   montelukast (Singulair) tablet 10 mg (10 mg oral Given 1/17/24 2028)   pantoprazole (ProtoNix) EC tablet 40 mg (40 mg oral Given 1/17/24 0606)   sertraline (Zoloft) tablet 25 mg (25 mg oral Given 1/17/24 2029)   polyethylene glycol (Glycolax, Miralax) packet 17 g (17 g oral Not Given 1/17/24 0900)   potassium chloride 20 mEq in 100 mL IV premix ( intravenous Canceled Entry 1/17/24 1115)   sodium chloride 0.9 % bolus 1,000 mL (0 mL intravenous Stopped 1/16/24 1324)   magnesium sulfate IV 2 g (0 g intravenous Stopped 1/16/24 1923)       New Prescriptions from this visit:    Current Discharge Medication List          Follow-up:  No follow-up provider specified.      Final Impression:   1. Fall, initial encounter    2. Non-traumatic rhabdomyolysis    3. Elevated troponin    4. Closed displaced subtrochanteric fracture of right femur, initial encounter (CMS/[facility])    5. Chest pain, unspecified type    6. Other chest pain    7. Closed fracture of neck of right femur, initial encounter (CMS/[facility])    8. Compression fracture of T11 vertebra, initial encounter (CMS/[facility])          (Please note that portions of this note were completed with a voice recognition program.  Efforts were made to edit the dictations but occasionally words are mis-transcribed.)     Clay Gilbert MD  Resident  01/16/24 9807    The patient was seen by the resident/fellow.  I have personally performed a substantive portion of the encounter.  I have seen and examined the patient; agree with the workup, evaluation, MDM, management and diagnosis.  The care plan has been discussed with the resident; I have reviewed the resident’s note and agree with the documented findings.           Sabine Husain,   01/17/24 5729

## 2024-01-17 ENCOUNTER — ANESTHESIA (OUTPATIENT)
Dept: OPERATING ROOM | Facility: HOSPITAL | Age: 89
DRG: 522 | End: 2024-01-17
Payer: MEDICARE

## 2024-01-17 ENCOUNTER — ANESTHESIA EVENT (OUTPATIENT)
Dept: OPERATING ROOM | Facility: HOSPITAL | Age: 89
DRG: 522 | End: 2024-01-17
Payer: MEDICARE

## 2024-01-17 ENCOUNTER — APPOINTMENT (OUTPATIENT)
Dept: CARDIOLOGY | Facility: HOSPITAL | Age: 89
DRG: 522 | End: 2024-01-17
Payer: MEDICARE

## 2024-01-17 LAB
ALBUMIN SERPL BCP-MCNC: 3.3 G/DL (ref 3.4–5)
ANION GAP SERPL CALC-SCNC: 14 MMOL/L (ref 10–20)
AORTIC VALVE PEAK VELOCITY: 1.64
ATRIAL RATE: 62 BPM
ATRIAL RATE: 66 BPM
AV PEAK GRADIENT: 10.8
AVA (PEAK VEL): 2.14
BUN SERPL-MCNC: 24 MG/DL (ref 6–23)
CALCIUM SERPL-MCNC: 8.5 MG/DL (ref 8.6–10.3)
CARDIAC TROPONIN I PNL SERPL HS: 210 NG/L (ref 0–13)
CHLORIDE SERPL-SCNC: 109 MMOL/L (ref 98–107)
CK SERPL-CCNC: 456 U/L (ref 0–215)
CO2 SERPL-SCNC: 21 MMOL/L (ref 21–32)
CREAT SERPL-MCNC: 1.26 MG/DL (ref 0.5–1.05)
EGFRCR SERPLBLD CKD-EPI 2021: 40 ML/MIN/1.73M*2
EJECTION FRACTION APICAL 4 CHAMBER: 66.6
EJECTION FRACTION: 61
ERYTHROCYTE [DISTWIDTH] IN BLOOD BY AUTOMATED COUNT: 13.2 % (ref 11.5–14.5)
GLUCOSE SERPL-MCNC: 94 MG/DL (ref 74–99)
HCT VFR BLD AUTO: 33.2 % (ref 36–46)
HGB BLD-MCNC: 10.6 G/DL (ref 12–16)
LEFT VENTRICLE INTERNAL DIMENSION DIASTOLE: 3.9 (ref 3.5–6)
LEFT VENTRICULAR OUTFLOW TRACT DIAMETER: 1.8
MCH RBC QN AUTO: 31.8 PG (ref 26–34)
MCHC RBC AUTO-ENTMCNC: 31.9 G/DL (ref 32–36)
MCV RBC AUTO: 100 FL (ref 80–100)
MITRAL VALVE E/A RATIO: 0.78
MITRAL VALVE E/E' RATIO: 12.86
NRBC BLD-RTO: 0 /100 WBCS (ref 0–0)
P AXIS: 70 DEGREES
P AXIS: 78 DEGREES
P OFFSET: 170 MS
P OFFSET: 173 MS
P ONSET: 118 MS
P ONSET: 120 MS
PHOSPHATE SERPL-MCNC: 3.3 MG/DL (ref 2.5–4.9)
PLATELET # BLD AUTO: 189 X10*3/UL (ref 150–450)
POTASSIUM SERPL-SCNC: 3.3 MMOL/L (ref 3.5–5.3)
PR INTERVAL: 180 MS
PR INTERVAL: 182 MS
Q ONSET: 208 MS
Q ONSET: 211 MS
QRS COUNT: 11 BEATS
QRS COUNT: 11 BEATS
QRS DURATION: 102 MS
QRS DURATION: 98 MS
QT INTERVAL: 468 MS
QT INTERVAL: 470 MS
QTC CALCULATION(BAZETT): 475 MS
QTC CALCULATION(BAZETT): 492 MS
QTC FREDERICIA: 473 MS
QTC FREDERICIA: 485 MS
R AXIS: -71 DEGREES
R AXIS: -73 DEGREES
RBC # BLD AUTO: 3.33 X10*6/UL (ref 4–5.2)
RIGHT VENTRICLE FREE WALL PEAK S': 12.7
RIGHT VENTRICLE PEAK SYSTOLIC PRESSURE: 6.7
SODIUM SERPL-SCNC: 141 MMOL/L (ref 136–145)
T AXIS: 77 DEGREES
T AXIS: 81 DEGREES
T OFFSET: 443 MS
T OFFSET: 445 MS
TRICUSPID ANNULAR PLANE SYSTOLIC EXCURSION: 2.1
VENTRICULAR RATE: 62 BPM
VENTRICULAR RATE: 66 BPM
WBC # BLD AUTO: 8.2 X10*3/UL (ref 4.4–11.3)

## 2024-01-17 PROCEDURE — A27236 PR FEMORAL FX, OPEN TX: Performed by: ANESTHESIOLOGIST ASSISTANT

## 2024-01-17 PROCEDURE — 84100 ASSAY OF PHOSPHORUS: CPT

## 2024-01-17 PROCEDURE — 82550 ASSAY OF CK (CPK): CPT | Performed by: INTERNAL MEDICINE

## 2024-01-17 PROCEDURE — 87086 URINE CULTURE/COLONY COUNT: CPT | Mod: STJLAB | Performed by: ORTHOPAEDIC SURGERY

## 2024-01-17 PROCEDURE — C1776 JOINT DEVICE (IMPLANTABLE): HCPCS | Performed by: ORTHOPAEDIC SURGERY

## 2024-01-17 PROCEDURE — 2500000004 HC RX 250 GENERAL PHARMACY W/ HCPCS (ALT 636 FOR OP/ED): Performed by: ORTHOPAEDIC SURGERY

## 2024-01-17 PROCEDURE — 27236 TREAT THIGH FRACTURE: CPT | Performed by: PHYSICIAN ASSISTANT

## 2024-01-17 PROCEDURE — 27236 TREAT THIGH FRACTURE: CPT | Performed by: ORTHOPAEDIC SURGERY

## 2024-01-17 PROCEDURE — C1713 ANCHOR/SCREW BN/BN,TIS/BN: HCPCS | Performed by: ORTHOPAEDIC SURGERY

## 2024-01-17 PROCEDURE — 1200000002 HC GENERAL ROOM WITH TELEMETRY DAILY

## 2024-01-17 PROCEDURE — 2780000003 HC OR 278 NO HCPCS: Performed by: ORTHOPAEDIC SURGERY

## 2024-01-17 PROCEDURE — 2500000001 HC RX 250 WO HCPCS SELF ADMINISTERED DRUGS (ALT 637 FOR MEDICARE OP)

## 2024-01-17 PROCEDURE — 99223 1ST HOSP IP/OBS HIGH 75: CPT | Performed by: INTERNAL MEDICINE

## 2024-01-17 PROCEDURE — 2500000004 HC RX 250 GENERAL PHARMACY W/ HCPCS (ALT 636 FOR OP/ED): Performed by: ANESTHESIOLOGIST ASSISTANT

## 2024-01-17 PROCEDURE — 2500000005 HC RX 250 GENERAL PHARMACY W/O HCPCS: Performed by: ANESTHESIOLOGIST ASSISTANT

## 2024-01-17 PROCEDURE — 93306 TTE W/DOPPLER COMPLETE: CPT

## 2024-01-17 PROCEDURE — 99232 SBSQ HOSP IP/OBS MODERATE 35: CPT

## 2024-01-17 PROCEDURE — 7100000001 HC RECOVERY ROOM TIME - INITIAL BASE CHARGE: Performed by: ORTHOPAEDIC SURGERY

## 2024-01-17 PROCEDURE — 2500000004 HC RX 250 GENERAL PHARMACY W/ HCPCS (ALT 636 FOR OP/ED)

## 2024-01-17 PROCEDURE — 0SRR0J9 REPLACEMENT OF RIGHT HIP JOINT, FEMORAL SURFACE WITH SYNTHETIC SUBSTITUTE, CEMENTED, OPEN APPROACH: ICD-10-PCS | Performed by: ORTHOPAEDIC SURGERY

## 2024-01-17 PROCEDURE — 99222 1ST HOSP IP/OBS MODERATE 55: CPT | Performed by: PHYSICIAN ASSISTANT

## 2024-01-17 PROCEDURE — A27236 PR FEMORAL FX, OPEN TX: Performed by: ANESTHESIOLOGY

## 2024-01-17 PROCEDURE — 99100 ANES PT EXTEME AGE<1 YR&>70: CPT | Performed by: ANESTHESIOLOGY

## 2024-01-17 PROCEDURE — 3600000010 HC OR TIME - EACH INCREMENTAL 1 MINUTE - PROCEDURE LEVEL FIVE: Performed by: ORTHOPAEDIC SURGERY

## 2024-01-17 PROCEDURE — 3700000002 HC GENERAL ANESTHESIA TIME - EACH INCREMENTAL 1 MINUTE: Performed by: ORTHOPAEDIC SURGERY

## 2024-01-17 PROCEDURE — 36415 COLL VENOUS BLD VENIPUNCTURE: CPT

## 2024-01-17 PROCEDURE — 3700000001 HC GENERAL ANESTHESIA TIME - INITIAL BASE CHARGE: Performed by: ORTHOPAEDIC SURGERY

## 2024-01-17 PROCEDURE — 84484 ASSAY OF TROPONIN QUANT: CPT | Performed by: INTERNAL MEDICINE

## 2024-01-17 PROCEDURE — 3600000005 HC OR TIME - INITIAL BASE CHARGE - PROCEDURE LEVEL FIVE: Performed by: ORTHOPAEDIC SURGERY

## 2024-01-17 PROCEDURE — 93306 TTE W/DOPPLER COMPLETE: CPT | Performed by: INTERNAL MEDICINE

## 2024-01-17 PROCEDURE — A4217 STERILE WATER/SALINE, 500 ML: HCPCS | Performed by: ORTHOPAEDIC SURGERY

## 2024-01-17 PROCEDURE — 2720000007 HC OR 272 NO HCPCS: Performed by: ORTHOPAEDIC SURGERY

## 2024-01-17 PROCEDURE — 85027 COMPLETE CBC AUTOMATED: CPT

## 2024-01-17 PROCEDURE — 7100000002 HC RECOVERY ROOM TIME - EACH INCREMENTAL 1 MINUTE: Performed by: ORTHOPAEDIC SURGERY

## 2024-01-17 DEVICE — IMPLANTABLE DEVICE: Type: IMPLANTABLE DEVICE | Site: HIP | Status: FUNCTIONAL

## 2024-01-17 DEVICE — IMPLANTABLE DEVICE
Type: IMPLANTABLE DEVICE | Site: HIP | Status: FUNCTIONAL
Brand: VERSYS®

## 2024-01-17 DEVICE — IMPLANTABLE DEVICE
Type: IMPLANTABLE DEVICE | Site: HIP | Status: FUNCTIONAL
Brand: QUIK-USE®

## 2024-01-17 DEVICE — FULL DOSE BONE CEMENT, 10 PACK CATALOG NUMBER IS 6191-1-010
Type: IMPLANTABLE DEVICE | Site: HIP | Status: FUNCTIONAL
Brand: SIMPLEX

## 2024-01-17 RX ORDER — SODIUM CHLORIDE, SODIUM LACTATE, POTASSIUM CHLORIDE, CALCIUM CHLORIDE 600; 310; 30; 20 MG/100ML; MG/100ML; MG/100ML; MG/100ML
50 INJECTION, SOLUTION INTRAVENOUS CONTINUOUS
Status: CANCELLED | OUTPATIENT
Start: 2024-01-17 | End: 2024-01-18

## 2024-01-17 RX ORDER — ASPIRIN 81 MG/1
81 TABLET ORAL 2 TIMES DAILY
Status: CANCELLED | OUTPATIENT
Start: 2024-01-17

## 2024-01-17 RX ORDER — SODIUM CHLORIDE, SODIUM LACTATE, POTASSIUM CHLORIDE, CALCIUM CHLORIDE 600; 310; 30; 20 MG/100ML; MG/100ML; MG/100ML; MG/100ML
100 INJECTION, SOLUTION INTRAVENOUS CONTINUOUS
Status: DISCONTINUED | OUTPATIENT
Start: 2024-01-17 | End: 2024-01-17 | Stop reason: HOSPADM

## 2024-01-17 RX ORDER — LABETALOL HYDROCHLORIDE 5 MG/ML
5 INJECTION, SOLUTION INTRAVENOUS
Status: DISCONTINUED | OUTPATIENT
Start: 2024-01-17 | End: 2024-01-17 | Stop reason: HOSPADM

## 2024-01-17 RX ORDER — HYDROMORPHONE HYDROCHLORIDE 1 MG/ML
0.5 INJECTION, SOLUTION INTRAMUSCULAR; INTRAVENOUS; SUBCUTANEOUS EVERY 4 HOURS PRN
Status: CANCELLED | OUTPATIENT
Start: 2024-01-17

## 2024-01-17 RX ORDER — DIPHENHYDRAMINE HCL 12.5MG/5ML
12.5 LIQUID (ML) ORAL EVERY 6 HOURS PRN
Status: CANCELLED | OUTPATIENT
Start: 2024-01-17

## 2024-01-17 RX ORDER — NALOXONE HYDROCHLORIDE 0.4 MG/ML
0.2 INJECTION, SOLUTION INTRAMUSCULAR; INTRAVENOUS; SUBCUTANEOUS EVERY 5 MIN PRN
Status: CANCELLED | OUTPATIENT
Start: 2024-01-17

## 2024-01-17 RX ORDER — ENOXAPARIN SODIUM 100 MG/ML
30 INJECTION SUBCUTANEOUS DAILY
Status: CANCELLED | OUTPATIENT
Start: 2024-01-17

## 2024-01-17 RX ORDER — HYDROMORPHONE HYDROCHLORIDE 1 MG/ML
0.5 INJECTION, SOLUTION INTRAMUSCULAR; INTRAVENOUS; SUBCUTANEOUS EVERY 5 MIN PRN
Status: DISCONTINUED | OUTPATIENT
Start: 2024-01-17 | End: 2024-01-17 | Stop reason: HOSPADM

## 2024-01-17 RX ORDER — LIDOCAINE HYDROCHLORIDE 20 MG/ML
INJECTION, SOLUTION EPIDURAL; INFILTRATION; INTRACAUDAL; PERINEURAL AS NEEDED
Status: DISCONTINUED | OUTPATIENT
Start: 2024-01-17 | End: 2024-01-17

## 2024-01-17 RX ORDER — BISACODYL 5 MG
10 TABLET, DELAYED RELEASE (ENTERIC COATED) ORAL DAILY PRN
Status: CANCELLED | OUTPATIENT
Start: 2024-01-17

## 2024-01-17 RX ORDER — HYDRALAZINE HYDROCHLORIDE 20 MG/ML
5 INJECTION INTRAMUSCULAR; INTRAVENOUS EVERY 30 MIN PRN
Status: DISCONTINUED | OUTPATIENT
Start: 2024-01-17 | End: 2024-01-17 | Stop reason: HOSPADM

## 2024-01-17 RX ORDER — CEFAZOLIN SODIUM 2 G/100ML
INJECTION, SOLUTION INTRAVENOUS AS NEEDED
Status: DISCONTINUED | OUTPATIENT
Start: 2024-01-17 | End: 2024-01-17

## 2024-01-17 RX ORDER — KETOROLAC TROMETHAMINE 30 MG/ML
15 INJECTION, SOLUTION INTRAMUSCULAR; INTRAVENOUS EVERY 6 HOURS
Status: CANCELLED | OUTPATIENT
Start: 2024-01-17 | End: 2024-01-18

## 2024-01-17 RX ORDER — POTASSIUM CHLORIDE 14.9 MG/ML
20 INJECTION INTRAVENOUS
Status: DISPENSED | OUTPATIENT
Start: 2024-01-17 | End: 2024-01-17

## 2024-01-17 RX ORDER — HYDROMORPHONE HYDROCHLORIDE 1 MG/ML
1 INJECTION, SOLUTION INTRAMUSCULAR; INTRAVENOUS; SUBCUTANEOUS EVERY 5 MIN PRN
Status: DISCONTINUED | OUTPATIENT
Start: 2024-01-17 | End: 2024-01-17 | Stop reason: HOSPADM

## 2024-01-17 RX ORDER — ALBUTEROL SULFATE 0.83 MG/ML
2.5 SOLUTION RESPIRATORY (INHALATION)
Status: DISCONTINUED | OUTPATIENT
Start: 2024-01-17 | End: 2024-01-17 | Stop reason: HOSPADM

## 2024-01-17 RX ORDER — DEXAMETHASONE SODIUM PHOSPHATE 4 MG/ML
INJECTION, SOLUTION INTRA-ARTICULAR; INTRALESIONAL; INTRAMUSCULAR; INTRAVENOUS; SOFT TISSUE AS NEEDED
Status: DISCONTINUED | OUTPATIENT
Start: 2024-01-17 | End: 2024-01-17

## 2024-01-17 RX ORDER — ONDANSETRON 4 MG/1
4 TABLET, ORALLY DISINTEGRATING ORAL EVERY 8 HOURS PRN
Status: CANCELLED | OUTPATIENT
Start: 2024-01-17

## 2024-01-17 RX ORDER — OXYCODONE HYDROCHLORIDE 10 MG/1
10 TABLET ORAL EVERY 4 HOURS PRN
Status: CANCELLED | OUTPATIENT
Start: 2024-01-17

## 2024-01-17 RX ORDER — CEFAZOLIN SODIUM 2 G/100ML
2 INJECTION, SOLUTION INTRAVENOUS EVERY 8 HOURS
Status: CANCELLED | OUTPATIENT
Start: 2024-01-17 | End: 2024-01-18

## 2024-01-17 RX ORDER — HYDROMORPHONE HYDROCHLORIDE 1 MG/ML
INJECTION, SOLUTION INTRAMUSCULAR; INTRAVENOUS; SUBCUTANEOUS AS NEEDED
Status: DISCONTINUED | OUTPATIENT
Start: 2024-01-17 | End: 2024-01-17

## 2024-01-17 RX ORDER — GLYCOPYRROLATE 0.2 MG/ML
INJECTION INTRAMUSCULAR; INTRAVENOUS AS NEEDED
Status: DISCONTINUED | OUTPATIENT
Start: 2024-01-17 | End: 2024-01-17

## 2024-01-17 RX ORDER — CYCLOBENZAPRINE HCL 10 MG
10 TABLET ORAL 3 TIMES DAILY PRN
Status: CANCELLED | OUTPATIENT
Start: 2024-01-17

## 2024-01-17 RX ORDER — METOCLOPRAMIDE HYDROCHLORIDE 5 MG/ML
10 INJECTION INTRAMUSCULAR; INTRAVENOUS ONCE AS NEEDED
Status: DISCONTINUED | OUTPATIENT
Start: 2024-01-17 | End: 2024-01-17 | Stop reason: HOSPADM

## 2024-01-17 RX ORDER — ACETAMINOPHEN 325 MG/1
650 TABLET ORAL EVERY 6 HOURS SCHEDULED
Status: CANCELLED | OUTPATIENT
Start: 2024-01-17

## 2024-01-17 RX ORDER — DOCUSATE SODIUM 100 MG/1
100 CAPSULE, LIQUID FILLED ORAL 2 TIMES DAILY
Status: CANCELLED | OUTPATIENT
Start: 2024-01-17

## 2024-01-17 RX ORDER — PROCHLORPERAZINE EDISYLATE 5 MG/ML
10 INJECTION INTRAMUSCULAR; INTRAVENOUS EVERY 6 HOURS PRN
Status: CANCELLED | OUTPATIENT
Start: 2024-01-17

## 2024-01-17 RX ORDER — PROCHLORPERAZINE 25 MG/1
25 SUPPOSITORY RECTAL EVERY 12 HOURS PRN
Status: CANCELLED | OUTPATIENT
Start: 2024-01-17

## 2024-01-17 RX ORDER — PHENYLEPHRINE 10 MG/250 ML(40 MCG/ML)IN 0.9 % SOD.CHLORIDE INTRAVENOUS
CONTINUOUS PRN
Status: DISCONTINUED | OUTPATIENT
Start: 2024-01-17 | End: 2024-01-17

## 2024-01-17 RX ORDER — PROCHLORPERAZINE MALEATE 10 MG
10 TABLET ORAL EVERY 6 HOURS PRN
Status: CANCELLED | OUTPATIENT
Start: 2024-01-17

## 2024-01-17 RX ORDER — ONDANSETRON HYDROCHLORIDE 2 MG/ML
4 INJECTION, SOLUTION INTRAVENOUS EVERY 8 HOURS PRN
Status: CANCELLED | OUTPATIENT
Start: 2024-01-17

## 2024-01-17 RX ORDER — MORPHINE SULFATE 2 MG/ML
2 INJECTION, SOLUTION INTRAMUSCULAR; INTRAVENOUS EVERY 2 HOUR PRN
Status: CANCELLED | OUTPATIENT
Start: 2024-01-17

## 2024-01-17 RX ORDER — FENTANYL CITRATE 50 UG/ML
INJECTION, SOLUTION INTRAMUSCULAR; INTRAVENOUS AS NEEDED
Status: DISCONTINUED | OUTPATIENT
Start: 2024-01-17 | End: 2024-01-17

## 2024-01-17 RX ORDER — OXYCODONE HYDROCHLORIDE 5 MG/1
5 TABLET ORAL EVERY 4 HOURS PRN
Status: CANCELLED | OUTPATIENT
Start: 2024-01-17

## 2024-01-17 RX ORDER — PROPOFOL 10 MG/ML
INJECTION, EMULSION INTRAVENOUS AS NEEDED
Status: DISCONTINUED | OUTPATIENT
Start: 2024-01-17 | End: 2024-01-17

## 2024-01-17 RX ORDER — DIPHENHYDRAMINE HYDROCHLORIDE 50 MG/ML
12.5 INJECTION INTRAMUSCULAR; INTRAVENOUS ONCE AS NEEDED
Status: DISCONTINUED | OUTPATIENT
Start: 2024-01-17 | End: 2024-01-17 | Stop reason: HOSPADM

## 2024-01-17 RX ORDER — TRANEXAMIC ACID 650 MG/1
1950 TABLET ORAL ONCE
Status: CANCELLED | OUTPATIENT
Start: 2024-01-17 | End: 2024-01-17

## 2024-01-17 RX ORDER — SODIUM CHLORIDE 0.9 G/100ML
IRRIGANT IRRIGATION AS NEEDED
Status: DISCONTINUED | OUTPATIENT
Start: 2024-01-17 | End: 2024-01-17 | Stop reason: HOSPADM

## 2024-01-17 RX ORDER — SODIUM CHLORIDE, SODIUM LACTATE, POTASSIUM CHLORIDE, CALCIUM CHLORIDE 600; 310; 30; 20 MG/100ML; MG/100ML; MG/100ML; MG/100ML
INJECTION, SOLUTION INTRAVENOUS CONTINUOUS PRN
Status: DISCONTINUED | OUTPATIENT
Start: 2024-01-17 | End: 2024-01-17

## 2024-01-17 RX ORDER — ROCURONIUM BROMIDE 50 MG/5 ML
SYRINGE (ML) INTRAVENOUS AS NEEDED
Status: DISCONTINUED | OUTPATIENT
Start: 2024-01-17 | End: 2024-01-17

## 2024-01-17 RX ADMIN — HYDROMORPHONE HYDROCHLORIDE 0.2 MG: 1 INJECTION, SOLUTION INTRAMUSCULAR; INTRAVENOUS; SUBCUTANEOUS at 14:36

## 2024-01-17 RX ADMIN — HYDROMORPHONE HYDROCHLORIDE 0.2 MG: 1 INJECTION, SOLUTION INTRAMUSCULAR; INTRAVENOUS; SUBCUTANEOUS at 14:20

## 2024-01-17 RX ADMIN — Medication 30 MG: at 13:10

## 2024-01-17 RX ADMIN — PROPOFOL 60 MG: 10 INJECTION, EMULSION INTRAVENOUS at 13:10

## 2024-01-17 RX ADMIN — SODIUM CHLORIDE, POTASSIUM CHLORIDE, SODIUM LACTATE AND CALCIUM CHLORIDE: 600; 310; 30; 20 INJECTION, SOLUTION INTRAVENOUS at 13:03

## 2024-01-17 RX ADMIN — PROPOFOL 140 MG: 10 INJECTION, EMULSION INTRAVENOUS at 13:35

## 2024-01-17 RX ADMIN — LIDOCAINE HYDROCHLORIDE 100 MG: 20 INJECTION, SOLUTION EPIDURAL; INFILTRATION; INTRACAUDAL; PERINEURAL at 13:10

## 2024-01-17 RX ADMIN — POTASSIUM CHLORIDE 20 MEQ: 14.9 INJECTION, SOLUTION INTRAVENOUS at 09:57

## 2024-01-17 RX ADMIN — FENTANYL CITRATE 50 MCG: 50 INJECTION, SOLUTION INTRAMUSCULAR; INTRAVENOUS at 13:10

## 2024-01-17 RX ADMIN — MONTELUKAST 10 MG: 10 TABLET, FILM COATED ORAL at 20:28

## 2024-01-17 RX ADMIN — Medication 0.2 MCG/KG/MIN: at 13:25

## 2024-01-17 RX ADMIN — Medication 20 MG: at 13:29

## 2024-01-17 RX ADMIN — ATENOLOL 25 MG: 25 TABLET ORAL at 09:57

## 2024-01-17 RX ADMIN — CEFAZOLIN SODIUM 2 G: 2 INJECTION, SOLUTION INTRAVENOUS at 13:29

## 2024-01-17 RX ADMIN — SUGAMMADEX 200 MG: 100 INJECTION, SOLUTION INTRAVENOUS at 14:35

## 2024-01-17 RX ADMIN — ONDANSETRON 4 MG: 2 INJECTION, SOLUTION INTRAMUSCULAR; INTRAVENOUS at 14:34

## 2024-01-17 RX ADMIN — HYDROXYZINE HYDROCHLORIDE 25 MG: 25 TABLET, FILM COATED ORAL at 20:28

## 2024-01-17 RX ADMIN — HYDROMORPHONE HYDROCHLORIDE 0.2 MG: 1 INJECTION, SOLUTION INTRAMUSCULAR; INTRAVENOUS; SUBCUTANEOUS at 14:05

## 2024-01-17 RX ADMIN — PANTOPRAZOLE SODIUM 40 MG: 40 TABLET, DELAYED RELEASE ORAL at 06:06

## 2024-01-17 RX ADMIN — GLYCOPYRROLATE 0.2 MG: 0.2 INJECTION, SOLUTION INTRAMUSCULAR; INTRAVENOUS at 13:45

## 2024-01-17 RX ADMIN — FENTANYL CITRATE 50 MCG: 50 INJECTION, SOLUTION INTRAMUSCULAR; INTRAVENOUS at 13:29

## 2024-01-17 RX ADMIN — DEXAMETHASONE SODIUM PHOSPHATE 4 MG: 4 INJECTION, SOLUTION INTRAMUSCULAR; INTRAVENOUS at 13:30

## 2024-01-17 RX ADMIN — LATANOPROST 1 DROP: 50 SOLUTION OPHTHALMIC at 21:00

## 2024-01-17 RX ADMIN — LEVOTHYROXINE SODIUM 100 MCG: 0.1 TABLET ORAL at 06:06

## 2024-01-17 RX ADMIN — SERTRALINE HYDROCHLORIDE 25 MG: 25 TABLET ORAL at 20:29

## 2024-01-17 SDOH — HEALTH STABILITY: MENTAL HEALTH: CURRENT SMOKER: 0

## 2024-01-17 ASSESSMENT — PAIN - FUNCTIONAL ASSESSMENT
PAIN_FUNCTIONAL_ASSESSMENT: 0-10

## 2024-01-17 ASSESSMENT — PAIN SCALES - GENERAL
PAINLEVEL_OUTOF10: 0 - NO PAIN

## 2024-01-17 NOTE — ANESTHESIA PROCEDURE NOTES
Airway  Date/Time: 1/17/2024 1:14 PM  Urgency: elective    Airway not difficult    Staffing  Performed: attending   Authorized by: Adriana Azar MD    Performed by: CHINO Alberto  Patient location during procedure: OR    Indications and Patient Condition  Indications for airway management: anesthesia  Spontaneous ventilation: present  Sedation level: deep  Preoxygenated: yes  Patient position: sniffing  Mask difficulty assessment: 1 - vent by mask    Final Airway Details  Final airway type: endotracheal airway      Successful airway: ETT  Cuffed: yes   Successful intubation technique: direct laryngoscopy  Endotracheal tube insertion site: oral  Blade: Chandra  Blade size: #3  ETT size (mm): 7.0  Cormack-Lehane Classification: grade I - full view of glottis  Placement verified by: chest auscultation and capnometry   Measured from: gums  ETT to gums (cm): 20  Number of attempts at approach: 1  Number of other approaches attempted: 0

## 2024-01-17 NOTE — CARE PLAN
Problem: Fluid Volume - Risk of, Imbalanced  Goal: *Balanced intake and output  Outcome: Progressing Towards Goal     Problem: Risk for Spread of Infection  Goal: Prevent transmission of infectious organism to others  Description: Prevent the transmission of infectious organisms to other patients, staff members, and visitors. Outcome: Progressing Towards Goal     Problem: Patient Education:  Go to Education Activity  Goal: Patient/Family Education  Outcome: Progressing Towards Goal     Problem: Falls - Risk of  Goal: *Absence of Falls  Description: Document Melinda Minium Fall Risk and appropriate interventions in the flowsheet.   Outcome: Progressing Towards Goal  Note: Fall Risk Interventions:  Mobility Interventions: Communicate number of staff needed for ambulation/transfer, Utilize walker, cane, or other assistive device              Elimination Interventions: Call light in reach, Patient to call for help with toileting needs, Stay With Me (per policy), Toileting schedule/hourly rounds              Problem: Patient Education: Go to Patient Education Activity  Goal: Patient/Family Education  Outcome: Progressing Towards Goal The patient's goals for the shift include      The clinical goals for the shift include promote pain control and rest    Problem: Pain - Adult  Goal: Verbalizes/displays adequate comfort level or baseline comfort level  Outcome: Progressing     Problem: Safety - Adult  Goal: Free from fall injury  Outcome: Progressing     Problem: Discharge Planning  Goal: Discharge to home or other facility with appropriate resources  Outcome: Progressing     Problem: Chronic Conditions and Co-morbidities  Goal: Patient's chronic conditions and co-morbidity symptoms are monitored and maintained or improved  Outcome: Progressing     Problem: Skin  Goal: Decreased wound size/increased tissue granulation at next dressing change  Outcome: Progressing  Flowsheets (Taken 1/17/2024 0306)  Decreased wound size/increased tissue granulation at next dressing change: Promote sleep for wound healing  Goal: Participates in plan/prevention/treatment measures  Outcome: Progressing  Flowsheets (Taken 1/17/2024 0306)  Participates in plan/prevention/treatment measures: Elevate heels  Goal: Prevent/manage excess moisture  Outcome: Progressing  Flowsheets (Taken 1/17/2024 0306)  Prevent/manage excess moisture: Cleanse incontinence/protect with barrier cream  Goal: Prevent/minimize sheer/friction injuries  Outcome: Progressing  Flowsheets (Taken 1/17/2024 0306)  Prevent/minimize sheer/friction injuries: HOB 30 degrees or less  Goal: Promote/optimize nutrition  Outcome: Progressing  Flowsheets (Taken 1/17/2024 0306)  Promote/optimize nutrition: Offer water/supplements/favorite foods  Goal: Promote skin healing  Outcome: Progressing  Flowsheets (Taken 1/17/2024 0306)  Promote skin healing: Assess skin/pad under line(s)/device(s)     Problem: Pain  Goal: Takes deep breaths with improved pain control throughout the shift  Outcome: Progressing  Goal: Turns in bed with improved pain control throughout the shift  Outcome: Progressing  Goal: Walks with  improved pain control throughout the shift  Outcome: Progressing  Goal: Performs ADL's with improved pain control throughout shift  Outcome: Progressing  Goal: Participates in PT with improved pain control throughout the shift  Outcome: Progressing  Goal: Free from opioid side effects throughout the shift  Outcome: Progressing  Goal: Free from acute confusion related to pain meds throughout the shift  Outcome: Progressing     Problem: Fall/Injury  Goal: Not fall by end of shift  Outcome: Progressing  Goal: Be free from injury by end of the shift  Outcome: Progressing  Goal: Verbalize understanding of personal risk factors for fall in the hospital  Outcome: Progressing  Goal: Verbalize understanding of risk factor reduction measures to prevent injury from fall in the home  Outcome: Progressing  Goal: Use assistive devices by end of the shift  Outcome: Progressing  Goal: Pace activities to prevent fatigue by end of the shift  Outcome: Progressing     Problem: Pain  Goal: My pain/discomfort is manageable  Outcome: Progressing     Problem: Safety  Goal: Patient will be injury free during hospitalization  Outcome: Progressing  Goal: I will remain free of falls  Outcome: Progressing     Problem: Daily Care  Goal: Daily care needs are met  Outcome: Progressing     Problem: Psychosocial Needs  Goal: Demonstrates ability to cope with hospitalization/illness  Outcome: Progressing  Goal: Collaborate with me, my family, and caregiver to identify my specific goals  Outcome: Progressing     Problem: Discharge Barriers  Goal: My discharge needs are met  Outcome: Progressing

## 2024-01-17 NOTE — PROGRESS NOTES
01/17/24 0910   Discharge Planning   Living Arrangements Other (Comment)   Support Systems Children   Type of Residence Assisted living   Care Facility Name [facility] or Post Acute Services Post acute facilities (Rehab/SNF/etc)   Type of Post Acute Facility Services Rehab   Patient expects to be discharged to: SNF   Does the patient need discharge transport arranged? Yes   RoundTrip coordination needed? Yes   Patient Choice   Provider Choice list and CMS website (https://medicare.gov/care-compare#search) for post-acute Quality and Resource Measure Data were provided and reviewed with: Patient     Met with patient at bedside. Admitted for right femur fracture after a fall. Pt arrived from [facility]. Fall was unwitnessed. Pt states she slipped in bathroom and was unable to get up. Surgery is planned for today. PT[facility]OT [facility]als pending. [facility]. Will review with patient when family arrives.

## 2024-01-17 NOTE — CONSULTS
Inpatient consult to Cardiology  Consult performed by: Rocael Montes De Oca MD  Consult ordered by: Andi Robbins MD  Reason for consult: Cardiac clearance  Assessment/Recommendations: Please see assessment and plan.      History Of Present Illness:    Fadumo Corona is a 89 y.o. female presenting with fall without loss of consciousness, hip fracture, preoperative cardiac risk assessment requested.    This 89-year-old hypertensive, hyperlipidemic woman has no known prior cardiac history to speak of.  She has had multiple and frequent falls, which is what happened necessitating her current hospitalization.  She felt dizzy at home and fell, fracturing her hip.  She has an alert button which she pressed, and was transported to [facility] for further evaluation and care.  She denies symptoms of angina, dyspnea, palpitations, orthopnea, PND, syncope, and near syncope.    While here her troponins have been slightly elevated.  An echocardiogram was performed which was normal for her age.  Please see details below.     Last Recorded Vitals:  Vitals:    01/17/24 0000 01/17/24 0400 01/17/24 0827 01/17/24 1157   BP: 119/56 121/54 142/65 159/65   BP Location: Left arm Left arm Left arm Left arm   Patient Position: Lying Lying Lying Lying   Pulse: 58 54 57 58   Resp: 16 16 16 16   Temp: 36.5 °C (97.7 °F) 36.3 °C (97.3 °F) 36.1 °C (97 °F) 36.1 °C (97 °F)   TempSrc: Temporal Temporal Temporal Temporal   SpO2: 93% 96% 95% 94%   Weight:       Height:           Last Labs:    Results from last 7 days   Lab Units 01/17/24  0345 01/16/24  1151   SODIUM mmol/L 141 141   POTASSIUM mmol/L 3.3* 4.0   CHLORIDE mmol/L 109* 106   CO2 mmol/L 21 26   BUN mg/dL 24* 23   CREATININE mg/dL 1.26* 1.27*   CALCIUM mg/dL 8.5* 9.4   PROTEIN TOTAL g/dL  --  6.5   BILIRUBIN TOTAL mg/dL  --  1.5*   ALK PHOS U/L  --  71   ALT U/L  --  19   AST U/L  --  40*   GLUCOSE mg/dL 94 107*        Results for orders placed or performed during the  hospital encounter of 01/16/24 (from the past 24 hour(s))   ECG 12 lead   Result Value Ref Range    Ventricular Rate 62 BPM    Atrial Rate 62 BPM    NV Interval 182 ms    QRS Duration 98 ms    QT Interval 468 ms    QTC Calculation(Bazett) 475 ms    P Axis 70 degrees    R Axis -71 degrees    T Axis 77 degrees    QRS Count 11 beats    Q Onset 211 ms    P Onset 120 ms    P Offset 173 ms    T Offset 445 ms    QTC Fredericia 473 ms   Troponin I, High Sensitivity   Result Value Ref Range    Troponin I, High Sensitivity 157 (HH) 0 - 13 ng/L   Sars-CoV-2 PCR, Screen Asymptomatic   Result Value Ref Range    Coronavirus 2019, PCR Not Detected Not Detected   Protime-INR   Result Value Ref Range    Protime 13.0 (H) 9.8 - 12.8 seconds    INR 1.2 (H) 0.9 - 1.1   Type and Screen   Result Value Ref Range    ABO TYPE A     Rh TYPE NEG     ANTIBODY SCREEN NEG    CBC   Result Value Ref Range    WBC 8.2 4.4 - 11.3 x10*3/uL    nRBC 0.0 0.0 - 0.0 /100 WBCs    RBC 3.33 (L) 4.00 - 5.20 x10*6/uL    Hemoglobin 10.6 (L) 12.0 - 16.0 g/dL    Hematocrit 33.2 (L) 36.0 - 46.0 %     80 - 100 fL    MCH 31.8 26.0 - 34.0 pg    MCHC 31.9 (L) 32.0 - 36.0 g/dL    RDW 13.2 11.5 - 14.5 %    Platelets 189 150 - 450 x10*3/uL   Renal Function Panel   Result Value Ref Range    Glucose 94 74 - 99 mg/dL    Sodium 141 136 - 145 mmol/L    Potassium 3.3 (L) 3.5 - 5.3 mmol/L    Chloride 109 (H) 98 - 107 mmol/L    Bicarbonate 21 21 - 32 mmol/L    Anion Gap 14 10 - 20 mmol/L    Urea Nitrogen 24 (H) 6 - 23 mg/dL    Creatinine 1.26 (H) 0.50 - 1.05 mg/dL    eGFR 40 (L) >60 mL/min/1.73m*2    Calcium 8.5 (L) 8.6 - 10.3 mg/dL    Phosphorus 3.3 2.5 - 4.9 mg/dL    Albumin 3.3 (L) 3.4 - 5.0 g/dL   Creatine Kinase   Result Value Ref Range    Creatine Kinase 456 (H) 0 - 215 U/L   Troponin I, High Sensitivity   Result Value Ref Range    Troponin I, High Sensitivity 210 (HH) 0 - 13 ng/L   Transthoracic Echo (TTE) Complete   Result Value Ref Range    AV pk bre 1.64     LV  biplane EF 61     LVOT diam 1.80     MV E/A ratio 0.78     MV avg E/e' ratio 12.86     Tricuspid annular plane systolic excursion 2.1     RV free wall pk S' 12.70     RVSP 6.7     LVIDd 3.90     Aortic Valve Area by Continuity of Peak Velocity 2.14     AV pk grad 10.8     LV A4C EF 66.6          PTT - No results in last year.  1.2   13.0 _     Troponin I, High Sensitivity   Date/Time Value Ref Range Status   01/17/2024 03:45  (HH) 0 - 13 ng/L Final     Comment:     Previous result verified on 1/16/2024 1106 on specimen/case [id number] called with component TRPHS for procedure Troponin I, High Sensitivity, Initial with value 133 ng/L.   01/16/2024 02:34  (HH) 0 - 13 ng/L Final     Comment:     Previous result verified on 1/16/2024 1106 on specimen/case [id number] called with component TRPHS for procedure Troponin I, High Sensitivity, Initial with value 133 ng/L.   01/16/2024 11:51  (HH) 0 - 13 ng/L Final     Comment:     Previous result verified on 1/16/2024 1106 on specimen/case [id number] called with component TRPHS for procedure Troponin I, High Sensitivity, Initial with value 133 ng/L.     VLDL   Date/Time Value Ref Range Status   03/17/2023 10:57 AM 26 0 - 40 mg/dL Final   08/27/2021 12:59 PM 31 0 - 40 mg/dL Final   04/17/2019 12:00 AM 25 0 - 40 mg/dL Final      Last I/O:  I/O last 3 completed shifts:  In: 968.8 (19.1 mL/kg) [I.V.:968.8 (19.1 mL/kg)]  Out: - (0 mL/kg)   Weight: 50.8 kg     Past Medical History:  She has a past medical history of Chest pain (03/23/2009), Distal radius fracture, left (03/26/2023), Forehead contusion (03/31/2023), Fungal nail infection (03/26/2023), Hand joint pain (06/08/2009), Hyperglycemia (03/26/2023), Keratosis, inflamed seborrheic (03/26/2023), Low back pain (11/10/2008), Pain in joint, ankle and foot (01/05/2016), Pain in joint, lower leg (05/31/2006), and Pain in limb (11/10/2008).    Past Surgical History:  She has a past surgical history  that includes Total knee arthroplasty (08/29/2013); Foot surgery (08/29/2013); Other surgical history (08/18/2020); Other surgical history (10/16/2019); and Other surgical history (10/16/2019).      Social History:  She reports that she has never smoked. She has never been exposed to tobacco smoke. She has never used smokeless tobacco. She reports that she does not currently use alcohol. She reports that she does not use drugs.    Family History:  Family History   Problem Relation Name Age of Onset    Heart attack Mother          Acute    Heart failure Father      Leukemia Daughter          Review of Systems:  Review of systems is positive for nausea, vomiting, diarrhea, constipation, dysuria, and abdominal pain.  Patient denies fevers, chills, hematuria, dysuria,   red blood per rectum, cough; all other review of systems is negative.    Allergies:  Bee venom protein (honey bee) and Penicillins    Inpatient Medications:  Scheduled medications   Medication Dose Route Frequency    amLODIPine  5 mg oral Daily    atenolol  25 mg oral Daily    [Held by provider] enoxaparin  30 mg subcutaneous q24h    hydroxychloroquine  200 mg oral Daily    hydrOXYzine HCL  25 mg oral Nightly    latanoprost  1 drop Both Eyes Nightly    levothyroxine  100 mcg oral Daily before breakfast    montelukast  10 mg oral Nightly    pantoprazole  40 mg oral Daily before breakfast    polyethylene glycol  17 g oral Daily    potassium chloride  20 mEq intravenous q2h    sertraline  25 mg oral Daily     PRN medications   Medication    acetaminophen    docusate sodium    HYDROmorphone    melatonin    ondansetron     Continuous Medications   Medication Dose Last Rate    sodium chloride 0.9%  75 mL/hr 75 mL/hr (01/17/24 0305)     Outpatient Medications:  Current Outpatient Medications   Medication Instructions    acetaminophen (TYLENOL) 500 mg, oral, Every 6 hours PRN    amLODIPine (NORVASC) 5 mg, oral, Daily, As directed    atenolol (TENORMIN) 25 mg,  oral, Daily    docusate sodium (COLACE) 100 mg, oral, 2 times daily PRN    hydroxychloroquine (PLAQUENIL) 200 mg, oral, Daily    hydrOXYzine HCL (ATARAX) 25 mg, oral, Nightly    latanoprost (Xalatan) 0.005 % ophthalmic solution 1 drop, Both Eyes, Nightly    levothyroxine (SYNTHROID, LEVOXYL) 100 mcg, oral, Daily    montelukast (SINGULAIR) 10 mg, oral, Daily    omeprazole (PRILOSEC) 40 mg, oral, Daily    sertraline (ZOLOFT) 25 mg, oral, Daily       Current Imaging  ECG 12 lead    Result Date: 1/17/2024  Sinus rhythm with Premature atrial complexes Incomplete right bundle branch block Left anterior fascicular block Left ventricular hypertrophy with repolarization abnormality Cannot rule out Septal infarct (cited on or before 23-JUN-2016) Abnormal ECG When compared with ECG of 23-JUN-2016 06:41, Premature atrial complexes are now Present Questionable change in initial forces of Septal leads Confirmed by Margo Perez ([id number]) on 1/17/2024 11:58:36 AM    ECG 12 lead    Result Date: 1/17/2024  Normal sinus rhythm Incomplete right bundle branch block Left anterior fascicular block Left ventricular hypertrophy with repolarization abnormality Cannot rule out Septal infarct (cited on or before 23-JUN-2016) Abnormal ECG When compared with ECG of 16-JAN-2024 10:43, (unconfirmed) Premature atrial complexes are no longer Present Confirmed by Margo Perez ([id number]) on 1/17/2024 11:58:02 AM    Transthoracic Echo (TTE) Complete    Result Date: 1/17/2024            [facility] [address] [address]    Tel [phone] Fax [phone] TRANSTHORACIC ECHOCARDIOGRAM REPORT  Patient Name:      LUPE CADET      Reading Physician:    [id number] Rocael Montes De Oca MD Study Date:        1/17/2024             Ordering Provider:    [id number] GINO BROWN MRN/PID:           [id number]               Fellow: Accession#:        [id number]          Nurse: Date of Birth/Age: 11/20/1932 / 89 years Sonographer:          Eugenia Hanleykatbuster                                                                MELACS Gender:            F                     Additional Staff: Height:            152.00 cm             Admit Date: Weight:            50.80 kg              Admission Status:     Inpatient -                                                                Routine BSA:               1.46 m2               Department Location:  [facility] Echo Lab Blood Pressure: 121 /54 mmHg Study Type:    TRANSTHORACIC ECHO (TTE) COMPLETE Diagnosis/ICD: Elevated Troponin-R79.89; Other chest pain-R07.89 Indication:    elevated troponin; chest pain  Study Detail: The following Echo studies were performed: 2D, M-Mode, Doppler and               color flow.  PHYSICIAN INTERPRETATION: Left Ventricle: Left ventricular systolic function is normal, with an estimated ejection fraction of 60-65%. Estimated left ventricular mass is increased. There are no regional wall motion abnormalities. The left ventricular cavity size is normal. There is mild concentric left ventricular hypertrophy. Spectral Doppler shows an impaired relaxation pattern of left ventricular diastolic filling. Left Atrium: The left atrium is normal in size. Right Ventricle: The right ventricle is normal in size. There is normal right ventricular global systolic function. Right Atrium: The right atrium is normal in size. Aortic Valve: The aortic valve appears abnormal. There is trivial aortic valve regurgitation. The peak instantaneous gradient of the aortic valve is 10.8 mmHg. There is aortic sclerosis with normal leaflet mobility. Mitral Valve: The mitral valve is abnormal. There is mild to moderate mitral annular calcification. There is trace mitral valve regurgitation. Tricuspid Valve: The tricuspid valve is structurally normal. There is trace tricuspid regurgitation. The right  ventricular systolic pressure is unable to be estimated. Pulmonic Valve: The pulmonic valve is structurally normal. There is no indication of pulmonic valve regurgitation. Pericardium: There is no pericardial effusion noted. Aorta: The aortic root is normal.  CONCLUSIONS:  1. Left ventricular systolic function is normal with a 60-65% estimated ejection fraction.  2. Increased LV mass.  3. Spectral Doppler shows an impaired relaxation pattern of left ventricular diastolic filling.  4. Aortic valve appears abnormal.  5. There is aortic sclerosis with normal leaflet mobility. QUANTITATIVE DATA SUMMARY: 2D MEASUREMENTS:                           Normal Ranges: LAs:           3.00 cm    (2.7-4.0cm) IVSd:          1.30 cm    (0.6-1.1cm) LVPWd:         1.40 cm    (0.6-1.1cm) LVIDd:         3.90 cm    (3.9-5.9cm) LVIDs:         2.70 cm LV Mass Index: 130.8 g/m2 LV % FS        30.8 % LA VOLUME:                             Normal Ranges: LA Area A4C:     12.0 cm2 LA Area A2C:     13.8 cm2 LA Volume Index: 19.9 ml/m2 M-MODE MEASUREMENTS:                  Normal Ranges: Ao Root: 3.10 cm (2.0-3.7cm) AoV Exc: 1.40 cm (1.5-2.5cm) AORTA MEASUREMENTS:                    Normal Ranges: AoV Exc:   1.40 cm (1.5-2.5cm) Asc Ao, d: 3.30 cm (2.1-3.4cm) LV SYSTOLIC FUNCTION BY 2D PLANIMETRY (MOD):                     Normal Ranges: EF-A4C View: 66.6 % (>=55%) EF-A2C View: 53.4 % EF-Biplane:  61.3 % LV DIASTOLIC FUNCTION:                        Normal Ranges: MV Peak E:    0.90 m/s (0.7-1.2 m/s) MV Peak A:    1.15 m/s (0.42-0.7 m/s) E/A Ratio:    0.78     (1.0-2.2) MV e'         0.07 m/s (>8.0) MV lateral e' 0.09 m/s MV medial e'  0.05 m/s E/e' Ratio:   12.86    (<8.0) MITRAL VALVE:                 Normal Ranges: MV DT: 243 msec (150-240msec) AORTIC VALVE:                          Normal Ranges: AoV Vmax:      1.64 m/s  (<=1.7m/s) AoV Peak PG:   10.8 mmHg (<20mmHg) LVOT Max Nikita:  1.38 m/s  (<=1.1m/s) LVOT VTI:      35.10 cm LVOT Diameter:  1.80 cm   (1.8-2.4cm) AoV Area,Vmax: 2.14 cm2  (2.5-4.5cm2) AORTIC INSUFFICIENCY: AI Vmax:       2.05 m/s AI Half-time:  487 msec AI Decel Rate: 123.00 cm/s2  RIGHT VENTRICLE: RV Basal 2.80 cm RV Mid   2.20 cm RV Major 6.3 cm TAPSE:   20.7 mm RV s'    0.13 m/s TRICUSPID VALVE/RVSP:                            Normal Ranges: Peak TR Velocity: 0.96 m/s RV Syst Pressure: 6.7 mmHg (< 30mmHg) PULMONIC VALVE:                         Normal Ranges: PV Accel Time: 102 msec (>120ms)  [id number] Rocael Montes De Oca MD Electronically signed on 1/17/2024 at 10:18:22 AM  ** Final **     XR femur right 2+ views    Result Date: 1/16/2024  Interpreted By:  Zay Selby, STUDY: XR FEMUR RIGHT 2+ VIEWS; XR PELVIS 1-2 VIEWS; ;  1/16/2024 12:32 pm   INDICATION: Signs/Symptoms:fall.   COMPARISON: None.   ACCESSION NUMBER(S): [id number]; [id number]   ORDERING CLINICIAN: JACQUELYN GARIBAY   FINDINGS: Four views of the right femur and single frontal view the pelvis. There is a impacted and mildly proximally retracted fracture of the humeral neck. No hip dislocation. Partially imaged right knee total arthroplasty without acute hardware complication.       Acute appearing fracture of the femoral neck.     MACRO: None   Signed by: Zay Selby 1/16/2024 12:42 PM Dictation workstation:   [id number]    XR pelvis 1-2 views    Result Date: 1/16/2024  Interpreted By:  Zay Selby, STUDY: XR FEMUR RIGHT 2+ VIEWS; XR PELVIS 1-2 VIEWS; ;  1/16/2024 12:32 pm   INDICATION: Signs/Symptoms:fall.   COMPARISON: None.   ACCESSION NUMBER(S): [id number]; [id number]   ORDERING CLINICIAN: JACQUELYN GARIBAY   FINDINGS: Four views of the right femur and single frontal view the pelvis. There is a impacted and mildly proximally retracted fracture of the humeral neck. No hip dislocation. Partially imaged right knee total arthroplasty without acute hardware complication.       Acute appearing fracture of the femoral neck.     MACRO: None   Signed by: Zay Selby 1/16/2024  12:42 PM Dictation workstation:   [id number]    CT abdomen pelvis wo IV contrast    Result Date: 1/16/2024  Interpreted By:  Cortez Horowitz, STUDY: CT ABDOMEN PELVIS WO IV CONTRAST;  1/16/2024 11:04 am   INDICATION: Signs/Symptoms:fall with abdominal pain.   COMPARISON: 03/31/2014   ACCESSION NUMBER(S): [id number]   ORDERING CLINICIAN: JACQUELYN GARIBAY   TECHNIQUE: CT of the abdomen and pelvis was performed. Contiguous axial images were obtained at 3 mm slice thickness through the abdomen and pelvis. Coronal and sagittal reconstructions at 3 mm slice thickness were performed.  No intravenous contrast was administered.   FINDINGS: Please note that the evaluation of vessels, lymph nodes and organs is limited without intravenous contrast.   LOWER CHEST: No bile duct dilatation.   ABDOMEN:   LIVER: The liver is grossly unremarkable. No definite focal masses within the limits of this unenhanced study.   BILE DUCTS: Prominence of the common bile duct may be due to prior cholecystectomy. Correlate with clinical laboratory findings.       GALLBLADDER: The gallbladder is not seen and may be surgically absent.   PANCREAS: Unremarkable pancreas.   SPLEEN: Unremarkable spleen.   ADRENAL GLANDS: The adrenal masses.   KIDNEYS AND URETERS: Atrophic right kidney. Likely parapelvic left renal cysts. No hydronephrosis.   PELVIS:   BLADDER: Grossly unremarkable.   REPRODUCTIVE ORGANS: No definite masses.   BOWEL: No evidence of bowel obstruction. There is sigmoid diverticulosis. Minimal fat stranding in the left lower quadrant. Correlate with concern for minimal acute diverticulitis. No free fluid abscess.   VESSELS: Aortic calcification. No aneurysmal dilatation.   PERITONEUM/RETROPERITONEUM/LYMPH NODES: No retroperitoneal adenopathy. No ascites. ABDOMINAL WALL: Grossly unremarkable.   BONES: Grade 1 anterolisthesis of L3 on L4. Significant compression fracture deformity of T11 of indeterminate age. There is associated retropulsion.  This is new since the prior examination of 03/31/2014. Slightly displaced right femoral neck fracture of indeterminate age. This is new since the prior examination of 03/21/2023.       Significant compression fracture deformity of T11 vertebral body of indeterminate age. This may be acute. There is associated retropulsion. Consider MRI to better evaluate central canal and neural foraminal as clinically warranted.   Displaced right femoral neck fracture of indeterminate age. This may be acute.   Small to moderate hiatal hernia.   Prominence of the common bile duct could be due to prior cholecystectomy. Correlate with clinical and laboratory findings.   Asymmetrically smaller right kidney.   Sigmoid diverticulosis with question of minimal fat stranding. Can exclude minimal acute diverticulitis. No free fluid or abscess.     MACRO: None   Signed by: Cortez Horowitz 1/16/2024 11:59 AM Dictation workstation:   [id number]    CT cervical spine wo IV contrast    Result Date: 1/16/2024  Interpreted By:  Cortez Horowitz, STUDY: CT CERVICAL SPINE WO IV CONTRAST;  1/16/2024 11:04 am   INDICATION: Signs/Symptoms:fall.   COMPARISON: 03/21/2023   ACCESSION NUMBER(S): [id number]   ORDERING CLINICIAN: MELISSA LANDEROS   TECHNIQUE: Axial CT images of the cervical spine are obtained. Axial, coronal and sagittal reconstructions are provided for review.   FINDINGS: Grade 1 anterolisthesis of C3 on C4. Grade 1 anterolisthesis of C7 on T1. Disc space loss at C5/C6. Endplate sclerosis and osteophytes are seen at multiple levels. No erosions. No acute fracture-dislocation.       No evidence for an acute fracture or subluxation of the cervical spine. Discogenic degenerative changes.   MACRO: None   Signed by: Cortez Horowitz 1/16/2024 11:51 AM Dictation workstation:   [id number]    XR chest 1 view    Result Date: 1/16/2024  Interpreted By:  Cortez Horowitz, STUDY: XR CHEST 1 VIEW;  1/16/2024 11:16 am   INDICATION: Signs/Symptoms:fall.   COMPARISON:  03/21/2023   ACCESSION NUMBER(S): [id number]   ORDERING CLINICIAN: MELISSA LANDEROS   FINDINGS:         CARDIOMEDIASTINAL SILHOUETTE: Cardiomediastinal silhouette is normal in size and configuration.   LUNGS: Lungs are clear.   ABDOMEN: No remarkable upper abdominal findings.   BONES: Degenerative changes of both shoulders..       1.  No evidence of acute cardiopulmonary process.       MACRO: None   Signed by: Cortez Horowitz 1/16/2024 11:47 AM Dictation workstation:   [id number]    CT head wo IV contrast    Result Date: 1/16/2024  Interpreted By:  Cortez Horowitz, STUDY: CT HEAD WO IV CONTRAST;  1/16/2024 11:04 am   INDICATION: Signs/Symptoms:fall.   COMPARISON: 03/21/2023   ACCESSION NUMBER(S): [id number]   ORDERING CLINICIAN: ARUBA LANDEROS   TECHNIQUE: Noncontrast axial CT scan of head was performed. Angled reformats in brain and bone windows were generated. The images were reviewed in bone, brain, blood and soft tissue windows.   FINDINGS: The ventricles, cisterns and sulci are prominent, consistent with mild diffuse volume loss. There are areas of nonspecific white matter hypodensity, which are probably age-related or microvascular in nature.   Gray-white differentiation is intact and there is no evidence of acute cortical infarct. 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 No mass, mass effect or midline shift is seen. There is no evidence of hemorrhage.   The visualized paranasal sinuses are clear.       No evidence of acute cortical infarct or intracranial hemorrhage.   Stable chronic changes.   MACRO: None   Signed by: Cortez Horowitz 1/16/2024 11:45 AM Dictation workstation:   [id number]       Physical Exam:  GENERAL:  pleasant 89 year-old  HEENT: No xanthelasma  NECK: Supple, no palpable adenopathy or thyromegaly  CHEST: Clear to auscultation, respiratory effort unlabored  CARDIAC: RRR, normal S1 and S2, no audible  rub, gallop, carotids are brisk, PMI is not displaced;  there is a 2/6 systolic murmur heard best at the RSB.  ABD: Active bowel sounds, nontender, no organomegaly, no evidence of ascites  EXT: No clubbing, cyanosis, edema, or tenderness  NEURO: Awake, alert, appropriate, speech is fluent       Assessment/Plan   1.  Higher than average cardiac risk for orthopedic surgery.  2.  Status post fall without loss of consciousness.  3.  Rhabdomyolysis.  4.  Nonischemic myocardial injury secondary to problem #3  5.  Essential hypertension  6.  Hyperlipidemia  7.  Frequent falls  8.  Echo ejection fraction 60 to 65% on study done today    Plan:  1.  Acceptable cardiac risk, albeit higher than average based on advanced age and coexisting disease.  2.  She is medically optimized for surgery.    Code Status:  DNR and No Intubation      Rocael Montes De Oca MD

## 2024-01-17 NOTE — OP NOTE
Preoperative diagnosis: Right femoral neck fracture    Postop diagnosis: Above    Procedure: Right hip hemiarthroplasty for fracture    Surgeon: Jeremy Parry M.D.    Asst.: Power HUERTASThe physician assistant was present through the entire case. Given the nature of the disease process and the procedure to be performed a skilled surgical assistant was necessary during the case. The assistant was necessary in order to hold retractors and directly assist in the operation. A certified scrub tech was at the back table managing instruments and supplies for the surgical case.    Anesthesia: Gen.    HPI: Patient fell and sustained the above described injury.  I had a long discussion with the patient and her daughter.  All of the risks, benefits, and potential complications for operative and nonoperative treatment were discussed at length with the patient.  Risks of operative intervention include, but are not limited to: Anesthesia complications, extensive blood loss, infection, damaged uninjured structures, blood clots, and lack of improvement or worsening of symptoms.  These complications could result in death, permanent disability, or need for reoperation.  The patient and her daughter completely understood these risks and wished to proceed with operative intervention.    Operative implants: Gisela size 12 stem, 42 mm bipolar head +2 neck length.    Blood Loss: 100 cc    Complications: None    Operative procedure: The patient was wheeled from the preanesthesia care unit to the theater. The patient was placed in supine position and all bony prominences were well-padded. For the entire procedure anesthesia maintainined control of the patient's head neck. The patient was then rolled into the lateral decubitus position with the right hip up and the downside well-padded. Pegs were placed at the pubic symphysis above the crack of the buttocks the inferior portion of the sternum in the back and were well-padded. This  held the patient in a nice lateral position. Blankets were placed between the patient's legs to provide padding. The right hip was marked and prepped and draped in normal sterile fashion.    Timeout was performed, site verification marking was verified, appropriate antibiotic demonstration was confirmed, and history and physical being updated and signed was verified. Next an incision on the lateral aspect of the hip was performed. Dissection was carried down to the skin with a knife. Bovie was used to dissect down to the fat to expose the fascia. The lateral fascia was incised longitudinally and the muscle was split manually fingers. A retractor was placed over the greater trochanter. A second retractor was placed just proximal to the piriformis under the gluteus medius retracting in a superior anterior direction. The hip was internally rotated and extended. The Bovie was used to release the piriformis external rotators and capsules a single layer off the bone of the greater trochanter. This was teed proximally as well. This exposed the fractured femoral neck. A corkscrew was placed into the femoral head and the femoral head was removed and sized. The acetabulum was healthy and normal. The acetabulum was irrigated with copious amounts of normal saline and removed of all foreign debris.    The hip was flexed internally rotated and adducted. A femoral neck retractor was placed under the femoral neck, a retractor was placed over the greater trochanter, and a blunt Axel was placed underneath the femoral neck retractor. This nicely exposed the proximal femur. Bovie was used to remove the soft tissues on the medial side of the greater trochanter. Cookie cutter was used to lateralize. Hand entry reaming was performed.  Power reaming was then used to lateralize.  Broaching was performed to the appropriate-sized broach was reached.  Calcar reaming was performed.  Trialing was performed and the appropriate neck length was  determined.  The broach was removed, the canal was irrigated with copious month's normal saline, a cement restrictor was placed, the canal was irrigated again and suctioned dry and a canal tampon was placed.  Cement was mixed the appropriate consistency.  The canal tampon was removed and cement was placed in a retrograde fashion and then pressurized.  The implant was inserted into the canal with the appropriate amount of version and all extraneous cement was removed.  The collar was resting on the calcar.  The cement was allowed to harden and trialing was performed.  The appropriate sized implants were determined and the permanent bipolar implant was tamped onto a cleaned trunion after the acetabulum was irrigated copiously. The hip was reduced. Leg lengths felt equal by palpating the heels and knees. The hip was stable with flexion up to 100°, flexion to 90 and internal rotation past 50°, and flexion to 45 and internal rotation past 70°. The hip could be extended to neutral and the knee could be flexed to 90 without any excessive tension. The hip was stable in external rotation as well.    The joint was irrigated with copious month's normal saline. A #1 Tycron suture was placed through the piriformis and capsule in a figure-of-eight fashion. A second #1 Tycron suture was placed just distal to that through the capsule in a figure-of-eight fashion. All sutures were thrown from posterior to anteriors so there was no contact with the sciatic nerve. The sutures were brought up through 2 drill holes in the greater trochanter and tied to each other with nicely reduced the posterior capsule. The T in the capsule was closed with #1 Tycron suture as well sewing posterior to anterior away from the sciatic nerve. There was no contact of the sciatic nerve that the case. The wound was irrigated and suctioned dry one more time. There was no excessive bleeding at this time except for a very minimal ooze.    The fascia was closed  with #1 Tycron figure-of-eight interrupted sutures. The fatty layer was closed with running 0 Vicryl. The skin was closed with 2-0 Vicryl 4-0 Monocryl and glue. A sterile dressing was applied. At the conclusion of the case the patient's toes were pink and warm with brisk capillary refill.      This dictation was created using voice recognition software. If there are any questions about inaccuracies please do not hesitate to contact me.

## 2024-01-17 NOTE — CONSULTS
Reason For Consult  T11 compression fracture    History Of Present Illness  Fadumo Corona is a 89 y.o. female presenting to [facility] emergency department following unwitnessed fall and dizziness.  Patient states she was in the bathroom when she lost her balance.  Scans on arrival including CT abdomen pelvis shows significant loss of height at T11 and neurosurgery placed on consult.  In review of history CT thoracic spine in 2020 shows mild loss of vertebral body height at T11.  On visit this morning patient is awake, alert, and cooperative.  Additionally with right hip fracture and was seen by orthopedics and is planned for surgical intervention.  At this time patient endorses mild back pain with palpation though denies radiating numbness or tingling.  She is able to move extremities distally though hip and knee joints not tested secondary to fracture.      Past Medical History  She has a past medical history of Chest pain (03/23/2009), Distal radius fracture, left (03/26/2023), Forehead contusion (03/31/2023), Fungal nail infection (03/26/2023), Hand joint pain (06/08/2009), Hyperglycemia (03/26/2023), Keratosis, inflamed seborrheic (03/26/2023), Low back pain (11/10/2008), Pain in joint, ankle and foot (01/05/2016), Pain in joint, lower leg (05/31/2006), and Pain in limb (11/10/2008).    Surgical History  She has a past surgical history that includes Total knee arthroplasty (08/29/2013); Foot surgery (08/29/2013); Other surgical history (08/18/2020); Other surgical history (10/16/2019); and Other surgical history (10/16/2019).     Social History  She reports that she has never smoked. She has never been exposed to tobacco smoke. She has never used smokeless tobacco. She reports that she does not currently use alcohol. She reports that she does not use drugs.    Family History  Family History   Problem Relation Name Age of Onset    Heart attack Mother          Acute    Heart failure Father       Leukemia Daughter          Allergies  Bee venom protein (honey bee) and Penicillins    Review of Systems  14/14 systems reviewed and negative other than what is listed in the history of present illness       Physical Exam  General: Well developed, awake/alert/oriented x3, no distress, alert and cooperative  Skin: Warm and dry, no lesions, no rashes  ENMT: Mucous membranes moist, no apparent injury, no lesions seen  Head/Neck: Neck Supple, no apparent injury  Respiratory/Thorax: Normal breath sounds with good chest expansion, thorax symmetric  Cardiovascular: No pitting edema, no JVD    Motor Strength: 5/5 upper extremity and ankle plantar/dorsiflexion    Muscle Bulk: Normal and symmetric in all extremities    Posture:   -- Cervical: Normal  -- Thoracic: Normal  -- Lumbar : Normal  Paraspinal muscle spasm/tenderness absent.   Midline tenderness absent    Sensation: intact to light touch       Last Recorded Vitals  Blood pressure 142/65, pulse 57, temperature 36.1 °C (97 °F), temperature source Temporal, resp. rate 16, height 1.524 m (5'), weight 50.8 kg (112 lb), SpO2 95 %.    Relevant Results  ECG 12 lead    Result Date: 1/16/2024  Normal sinus rhythm Incomplete right bundle branch block Left anterior fascicular block Left ventricular hypertrophy with repolarization abnormality Cannot rule out Septal infarct (cited on or before 23-JUN-2016) Abnormal ECG When compared with ECG of 16-JAN-2024 10:43, (unconfirmed) Premature atrial complexes are no longer Present    XR femur right 2+ views    Result Date: 1/16/2024  Interpreted By:  Zay Selby, STUDY: XR FEMUR RIGHT 2+ VIEWS; XR PELVIS 1-2 VIEWS; ;  1/16/2024 12:32 pm   INDICATION: Signs/Symptoms:fall.   COMPARISON: None.   ACCESSION NUMBER(S): [id number]; [id number]   ORDERING CLINICIAN: JACQUELYN GARIBAY   FINDINGS: Four views of the right femur and single frontal view the pelvis. There is a impacted and mildly proximally retracted fracture of the humeral neck. No  hip dislocation. Partially imaged right knee total arthroplasty without acute hardware complication.       Acute appearing fracture of the femoral neck.     MACRO: None   Signed by: Zay Selby 1/16/2024 12:42 PM Dictation workstation:   [id number]    XR pelvis 1-2 views    Result Date: 1/16/2024  Interpreted By:  Zay Selby, STUDY: XR FEMUR RIGHT 2+ VIEWS; XR PELVIS 1-2 VIEWS; ;  1/16/2024 12:32 pm   INDICATION: Signs/Symptoms:fall.   COMPARISON: None.   ACCESSION NUMBER(S): [id number]; [id number]   ORDERING CLINICIAN: JACQUELYN GARIBAY   FINDINGS: Four views of the right femur and single frontal view the pelvis. There is a impacted and mildly proximally retracted fracture of the humeral neck. No hip dislocation. Partially imaged right knee total arthroplasty without acute hardware complication.       Acute appearing fracture of the femoral neck.     MACRO: None   Signed by: Zay Selby 1/16/2024 12:42 PM Dictation workstation:   [id number]    CT abdomen pelvis wo IV contrast    Result Date: 1/16/2024  Interpreted By:  Cortez Horowitz, STUDY: CT ABDOMEN PELVIS WO IV CONTRAST;  1/16/2024 11:04 am   INDICATION: Signs/Symptoms:fall with abdominal pain.   COMPARISON: 03/31/2014   ACCESSION NUMBER(S): [id number]   ORDERING CLINICIAN: JACQUELYN GARIBAY   TECHNIQUE: CT of the abdomen and pelvis was performed. Contiguous axial images were obtained at 3 mm slice thickness through the abdomen and pelvis. Coronal and sagittal reconstructions at 3 mm slice thickness were performed.  No intravenous contrast was administered.   FINDINGS: Please note that the evaluation of vessels, lymph nodes and organs is limited without intravenous contrast.   LOWER CHEST: No bile duct dilatation.   ABDOMEN:   LIVER: The liver is grossly unremarkable. No definite focal masses within the limits of this unenhanced study.   BILE DUCTS: Prominence of the common bile duct may be due to prior cholecystectomy. Correlate with clinical laboratory  findings.       GALLBLADDER: The gallbladder is not seen and may be surgically absent.   PANCREAS: Unremarkable pancreas.   SPLEEN: Unremarkable spleen.   ADRENAL GLANDS: The adrenal masses.   KIDNEYS AND URETERS: Atrophic right kidney. Likely parapelvic left renal cysts. No hydronephrosis.   PELVIS:   BLADDER: Grossly unremarkable.   REPRODUCTIVE ORGANS: No definite masses.   BOWEL: No evidence of bowel obstruction. There is sigmoid diverticulosis. Minimal fat stranding in the left lower quadrant. Correlate with concern for minimal acute diverticulitis. No free fluid abscess.   VESSELS: Aortic calcification. No aneurysmal dilatation.   PERITONEUM/RETROPERITONEUM/LYMPH NODES: No retroperitoneal adenopathy. No ascites. ABDOMINAL WALL: Grossly unremarkable.   BONES: Grade 1 anterolisthesis of L3 on L4. Significant compression fracture deformity of T11 of indeterminate age. There is associated retropulsion. This is new since the prior examination of 03/31/2014. Slightly displaced right femoral neck fracture of indeterminate age. This is new since the prior examination of 03/21/2023.       Significant compression fracture deformity of T11 vertebral body of indeterminate age. This may be acute. There is associated retropulsion. Consider MRI to better evaluate central canal and neural foraminal as clinically warranted.   Displaced right femoral neck fracture of indeterminate age. This may be acute.   Small to moderate hiatal hernia.   Prominence of the common bile duct could be due to prior cholecystectomy. Correlate with clinical and laboratory findings.   Asymmetrically smaller right kidney.   Sigmoid diverticulosis with question of minimal fat stranding. Can exclude minimal acute diverticulitis. No free fluid or abscess.     MACRO: None   Signed by: Cortez Horowitz 1/16/2024 11:59 AM Dictation workstation:   [id number]    ECG 12 lead    Result Date: 1/16/2024  Sinus rhythm with Premature atrial complexes Incomplete right  bundle branch block Left anterior fascicular block Left ventricular hypertrophy with repolarization abnormality Cannot rule out Septal infarct (cited on or before 23-JUN-2016) Abnormal ECG When compared with ECG of 23-JUN-2016 06:41, Premature atrial complexes are now Present Questionable change in initial forces of Septal leads    CT cervical spine wo IV contrast    Result Date: 1/16/2024  Interpreted By:  Cortez Horowitz, STUDY: CT CERVICAL SPINE WO IV CONTRAST;  1/16/2024 11:04 am   INDICATION: Signs/Symptoms:fall.   COMPARISON: 03/21/2023   ACCESSION NUMBER(S): [id number]   ORDERING CLINICIAN: MELISSA LANDEROS   TECHNIQUE: Axial CT images of the cervical spine are obtained. Axial, coronal and sagittal reconstructions are provided for review.   FINDINGS: Grade 1 anterolisthesis of C3 on C4. Grade 1 anterolisthesis of C7 on T1. Disc space loss at C5/C6. Endplate sclerosis and osteophytes are seen at multiple levels. No erosions. No acute fracture-dislocation.       No evidence for an acute fracture or subluxation of the cervical spine. Discogenic degenerative changes.   MACRO: None   Signed by: Cortez Horowitz 1/16/2024 11:51 AM Dictation workstation:   [id number]    XR chest 1 view    Result Date: 1/16/2024  Interpreted By:  Cortez Horowitz, STUDY: XR CHEST 1 VIEW;  1/16/2024 11:16 am   INDICATION: Signs/Symptoms:fall.   COMPARISON: 03/21/2023   ACCESSION NUMBER(S): [id number]   ORDERING CLINICIAN: MELISSA LANDEROS   FINDINGS:         CARDIOMEDIASTINAL SILHOUETTE: Cardiomediastinal silhouette is normal in size and configuration.   LUNGS: Lungs are clear.   ABDOMEN: No remarkable upper abdominal findings.   BONES: Degenerative changes of both shoulders..       1.  No evidence of acute cardiopulmonary process.       MACRO: None   Signed by: Cortez Horowitz 1/16/2024 11:47 AM Dictation workstation:   [id number]    CT head wo IV contrast    Result Date: 1/16/2024  Interpreted By:  Cortez Horowitz, STUDY: CT HEAD WO IV CONTRAST;   1/16/2024 11:04 am   INDICATION: Signs/Symptoms:fall.   COMPARISON: 03/21/2023   ACCESSION NUMBER(S): [id number]   ORDERING CLINICIAN: ARUBA LANDEROS   TECHNIQUE: Noncontrast axial CT scan of head was performed. Angled reformats in brain and bone windows were generated. The images were reviewed in bone, brain, blood and soft tissue windows.   FINDINGS: The ventricles, cisterns and sulci are prominent, consistent with mild diffuse volume loss. There are areas of nonspecific white matter hypodensity, which are probably age-related or microvascular in nature.   Gray-white differentiation is intact and there is no evidence of acute cortical infarct. 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 No mass, mass effect or midline shift is seen. There is no evidence of hemorrhage.   The visualized paranasal sinuses are clear.       No evidence of acute cortical infarct or intracranial hemorrhage.   Stable chronic changes.   MACRO: None   Signed by: Cortez Horowitz 1/16/2024 11:45 AM Dictation workstation:   [id number]        Assessment/Plan     T11 compression fracture near vertebral plana  No acute neurosurgical intervention planned  LSO brace recommended as well as spine precautions during surgery  PT/OT activity per orthopedics with no neurosurgical restrictions when brace in place    Heron Latham PA-C

## 2024-01-17 NOTE — CARE PLAN
The patient's goals for the shift include      The clinical goals for the shift include promote pain control and rest      Problem: Pain - Adult  Goal: Verbalizes/displays adequate comfort level or baseline comfort level  Outcome: Progressing     Problem: Safety - Adult  Goal: Free from fall injury  Outcome: Progressing     Problem: Discharge Planning  Goal: Discharge to home or other facility with appropriate resources  Outcome: Progressing     Problem: Chronic Conditions and Co-morbidities  Goal: Patient's chronic conditions and co-morbidity symptoms are monitored and maintained or improved  Outcome: Progressing     Problem: Skin  Goal: Decreased wound size/increased tissue granulation at next dressing change  Outcome: Progressing  Goal: Participates in plan/prevention/treatment measures  Outcome: Progressing  Goal: Prevent/manage excess moisture  Outcome: Progressing  Goal: Prevent/minimize sheer/friction injuries  Outcome: Progressing  Goal: Promote/optimize nutrition  Outcome: Progressing  Goal: Promote skin healing  Outcome: Progressing     Problem: Pain  Goal: Takes deep breaths with improved pain control throughout the shift  Outcome: Progressing  Goal: Turns in bed with improved pain control throughout the shift  Outcome: Progressing  Goal: Walks with improved pain control throughout the shift  Outcome: Progressing  Goal: Performs ADL's with improved pain control throughout shift  Outcome: Progressing  Goal: Participates in PT with improved pain control throughout the shift  Outcome: Progressing  Goal: Free from opioid side effects throughout the shift  Outcome: Progressing  Goal: Free from acute confusion related to pain meds throughout the shift  Outcome: Progressing     Problem: Fall/Injury  Goal: Not fall by end of shift  Outcome: Progressing  Goal: Be free from injury by end of the shift  Outcome: Progressing  Goal: Verbalize understanding of personal risk factors for fall in the hospital  Outcome:  Progressing  Goal: Verbalize understanding of risk factor reduction measures to prevent injury from fall in the home  Outcome: Progressing  Goal: Use assistive devices by end of the shift  Outcome: Progressing  Goal: Pace activities to prevent fatigue by end of the shift  Outcome: Progressing     Problem: Pain  Goal: My pain/discomfort is manageable  Outcome: Progressing     Problem: Safety  Goal: Patient will be injury free during hospitalization  Outcome: Progressing  Goal: I will remain free of falls  Outcome: Progressing     Problem: Daily Care  Goal: Daily care needs are met  Outcome: Progressing     Problem: Psychosocial Needs  Goal: Demonstrates ability to cope with hospitalization/illness  Outcome: Progressing  Goal: Collaborate with me, my family, and caregiver to identify my specific goals  Outcome: Progressing     Problem: Discharge Barriers  Goal: My discharge needs are met  Outcome: Progressing

## 2024-01-17 NOTE — ANESTHESIA POSTPROCEDURE EVALUATION
Patient: Fadumo Corona    Procedure Summary       Date: 01/17/24 Room / Location: [facility] OR 06 / Virtual [facility] OR    Anesthesia Start: 1300 Anesthesia Stop:     Procedure: Hip Hemiarthroplasty (Right: Hip) Diagnosis:       Closed displaced subtrochanteric fracture of right femur, initial encounter (CMS/[facility])      (Closed displaced subtrochanteric fracture of right femur, initial encounter (CMS/[facility]) [S72.21XA])    Surgeons: Jeremy Parry MD Responsible Provider: Adriana Azar MD    Anesthesia Type: general ASA Status: 2            Anesthesia Type: general    Vitals Value Taken Time   /61 01/17/24 1505   Temp 36.7 01/17/24 1505   Pulse 64 01/17/24 1505   Resp 16 01/17/24 1505   SpO2 97 01/17/24 1505       Anesthesia Post Evaluation    Patient location during evaluation: PACU  Patient participation: complete - patient cannot participate  Level of consciousness: sleepy but conscious  Pain management: adequate  Airway patency: patent  Cardiovascular status: acceptable  Respiratory status: acceptable, face mask and spontaneous ventilation  Hydration status: acceptable  Postoperative Nausea and Vomiting: none        There were no known notable events for this encounter.

## 2024-01-17 NOTE — ANESTHESIA PREPROCEDURE EVALUATION
Patient: Fadumo Corona    Procedure Information       Date/Time: 01/17/24 1140    Procedure: Hip Hemiarthroplasty (Right: Hip) - with cement    Location: [facility] / [facility]    Surgeons: Jeremy Parry MD            Relevant Problems   Cardiovascular   (+) Hyperlipidemia   (+) Primary hypertension   (+) Rib pain on left side      Endocrine   (+) Acquired hypothyroidism      GI   (+) Gastroesophageal reflux disease without esophagitis   (+) IBS (irritable bowel syndrome)      /Renal   (+) CKD (chronic kidney disease), stage III (CMS/HCC)      Neuro/Psych   (+) UMAIR (generalized anxiety disorder)   (+) Lumbar radiculopathy   (+) Major depressive disorder, recurrent, unspecified (CMS/HCC)   (+) Goldstein's neuroma   (+) Sciatica of left side      Pulmonary   (+) Unspecified asthma, uncomplicated      Musculoskeletal   (+) DJD (degenerative joint disease)   (+) Fibromyalgia   (+) Lumbar spondylosis   (+) Lumbosacral spondylosis without myelopathy   (+) Osteoarthritis       Clinical information reviewed:   Tobacco  Allergies  Meds   Med Hx  Surg Hx   Fam Hx  Soc Hx        NPO Detail:  No data recorded     Physical Exam    Airway  Mallampati: II  TM distance: >3 FB  Neck ROM: full     Cardiovascular - normal exam     Dental   (+) upper dentures     Pulmonary - normal exam     Abdominal - normal exam           Vitals:    01/17/24 1157   BP: 159/65   Pulse: 58   Resp: 16   Temp: 36.1 °C (97 °F)   SpO2: 94%       Past Surgical History:   Procedure Laterality Date    FOOT SURGERY  08/29/2013    Foot Surgery    OTHER SURGICAL HISTORY  08/18/2020    Cataract surgery    OTHER SURGICAL HISTORY  10/16/2019    Cholecystectomy    OTHER SURGICAL HISTORY  10/16/2019    Hysterectomy    TOTAL KNEE ARTHROPLASTY  08/29/2013    Knee Replacement     Past Medical History:   Diagnosis Date    Chest pain 03/23/2009    Distal radius fracture, left 03/26/2023 12/11/2020? Caused by a fall at home. Managed by Dr. Hayes      She is no longer the cast and has slowly been regaining strength and function    Forehead contusion 03/31/2023    Fungal nail infection 03/26/2023    Hand joint pain 06/08/2009    Hyperglycemia 03/26/2023    Keratosis, inflamed seborrheic 03/26/2023    Low back pain 11/10/2008    Pain in joint, ankle and foot 01/05/2016    Pain in joint, lower leg 05/31/2006    Pain in limb 11/10/2008       Current Facility-Administered Medications:     acetaminophen (Tylenol) tablet 650 mg, 650 mg, oral, q6h PRN, Ana Shaw DO    amLODIPine (Norvasc) tablet 5 mg, 5 mg, oral, Daily, Ana Shaw DO    atenolol (Tenormin) tablet 25 mg, 25 mg, oral, Daily, Ana Shaw DO, 25 mg at 01/17/24 0957    docusate sodium (Colace) capsule 100 mg, 100 mg, oral, BID PRN, Ana Shaw DO    [Held by provider] enoxaparin (Lovenox) syringe 30 mg, 30 mg, subcutaneous, q24h, Ana Shaw DO    HYDROmorphone (Dilaudid) injection 0.4 mg, 0.4 mg, intravenous, q3h PRN, Ana Shaw DO    HYDROmorphone PF (Dilaudid) injection 0.2 mg, 0.2 mg, intravenous, q3h PRN, Ana Shaw DO    hydroxychloroquine (Plaquenil) tablet 200 mg, 200 mg, oral, Daily, Ana Shaw DO    hydrOXYzine HCL (Atarax) tablet 25 mg, 25 mg, oral, Nightly, Ana Shaw DO, 25 mg at 01/16/24 2140    latanoprost (Xalatan) 0.005 % ophthalmic solution 1 drop, 1 drop, Both Eyes, Nightly, Ana Shaw DO, 1 drop at 01/16/24 2100    levothyroxine (Synthroid, Levoxyl) tablet 100 mcg, 100 mcg, oral, Daily before breakfast, Ana Shaw DO, 100 mcg at 01/17/24 0606    melatonin tablet 3 mg, 3 mg, oral, Nightly PRN, Ana Shaw DO    montelukast (Singulair) tablet 10 mg, 10 mg, oral, Nightly, Ana Shaw DO, 10 mg at 01/16/24 2140    ondansetron (Zofran) injection 4 mg, 4 mg, intravenous, q6h PRN, Ana Shaw DO    pantoprazole (ProtoNix) EC tablet 40 mg, 40 mg, oral, Daily before breakfast, Ana Shaw DO, 40 mg at 01/17/24 0606    polyethylene glycol  (Glycolax, Miralax) packet 17 g, 17 g, oral, Daily, Ana Shaw DO    potassium chloride 20 mEq in 100 mL IV premix, 20 mEq, intravenous, q2h, Ana Shaw DO, Last Rate: 50 mL/hr at 01/17/24 0957, 20 mEq at 01/17/24 0957    sertraline (Zoloft) tablet 25 mg, 25 mg, oral, Daily, Ana Shaw DO, 25 mg at 01/16/24 2140    sodium chloride 0.9% infusion, 75 mL/hr, intravenous, Continuous, Ana Shaw DO, Last Rate: 75 mL/hr at 01/17/24 0305, 75 mL/hr at 01/17/24 0305  Prior to Admission medications    Medication Sig Start Date End Date Taking? Authorizing Provider   acetaminophen (Tylenol) 500 mg tablet Take 1 tablet (500 mg) by mouth every 6 hours if needed for mild pain (1 - 3).    Historical Provider, MD   amLODIPine (Norvasc) 5 mg tablet Take 1 tablet (5 mg) by mouth once daily. As directed 1/12/24   Zen Alvarez MD   atenolol (Tenormin) 25 mg tablet Take 1 tablet (25 mg) by mouth once daily. 1/12/24   Zen Alvarez MD   docusate sodium (Colace) 100 mg capsule Take 1 capsule (100 mg) by mouth 2 times a day as needed for constipation.    Historical Provider, MD   hydroxychloroquine (Plaquenil) 200 mg tablet Take 1 tablet (200 mg) by mouth once daily. 12/13/23   Zen Alvarez MD   hydrOXYzine HCL (Atarax) 25 mg tablet Take 1 tablet (25 mg) by mouth once daily at bedtime. 1/12/24   Zen Alvarez MD   latanoprost (Xalatan) 0.005 % ophthalmic solution Administer 1 drop into both eyes once daily at bedtime. 8/17/23   Historical Provider, MD   levothyroxine (Synthroid, Levoxyl) 100 mcg tablet Take 1 tablet (100 mcg) by mouth once daily. 12/13/23   Zen Alvarez MD   montelukast (Singulair) 10 mg tablet Take 1 tablet (10 mg) by mouth once daily.    Historical Provider, MD   omeprazole (PriLOSEC) 40 mg DR capsule Take 1 capsule (40 mg) by mouth once daily. 1/12/24   Zen Alvarez MD   sertraline (Zoloft) 50 mg tablet Take 0.5 tablets (25 mg) by mouth once daily. 12/13/23   Zen Alvarez MD    amLODIPine (Norvasc) 5 mg tablet Take 1 tablet (5 mg) by mouth once daily. As directed 9/7/23 1/12/24  Zen Alvarez MD   atenolol (Tenormin) 25 mg tablet Take 1 tablet (25 mg) by mouth once daily. 9/7/23 1/12/24  Zen Alvarez MD   hydrOXYzine HCL (Atarax) 25 mg tablet Take 1 tablet (25 mg) by mouth once daily at bedtime. 9/7/23 1/12/24  Zen Alvarez MD   omeprazole (PriLOSEC) 40 mg DR capsule Take 1 capsule (40 mg) by mouth once daily. 9/7/23 1/12/24  Zen Alvarez MD     Allergies   Allergen Reactions    Bee Venom Protein (Honey Bee) Unknown    Penicillins Unknown     Social History     Tobacco Use    Smoking status: Never     Passive exposure: Never    Smokeless tobacco: Never   Substance Use Topics    Alcohol use: Not Currently         Chemistry    Lab Results   Component Value Date/Time     01/17/2024 0345    K 3.3 (L) 01/17/2024 0345     (H) 01/17/2024 0345    CO2 21 01/17/2024 0345    BUN 24 (H) 01/17/2024 0345    CREATININE 1.26 (H) 01/17/2024 0345    Lab Results   Component Value Date/Time    CALCIUM 8.5 (L) 01/17/2024 0345    ALKPHOS 71 01/16/2024 1151    AST 40 (H) 01/16/2024 1151    ALT 19 01/16/2024 1151    BILITOT 1.5 (H) 01/16/2024 1151          Lab Results   Component Value Date/Time    WBC 8.2 01/17/2024 0345    HGB 10.6 (L) 01/17/2024 0345    HCT 33.2 (L) 01/17/2024 0345     01/17/2024 0345     Lab Results   Component Value Date/Time    PROTIME 13.0 (H) 01/16/2024 1534    INR 1.2 (H) 01/16/2024 1534     Encounter Date: 01/16/24   ECG 12 lead   Result Value    Ventricular Rate 62    Atrial Rate 62    UT Interval 182    QRS Duration 98    QT Interval 468    QTC Calculation(Bazett) 475    P Axis 70    R Axis -71    T Axis 77    QRS Count 11    Q Onset 211    P Onset 120    P Offset 173    T Offset 445    QTC Fredericia 473    Narrative    Normal sinus rhythm  Incomplete right bundle branch block  Left anterior fascicular block  Left ventricular hypertrophy with  repolarization abnormality  Cannot rule out Septal infarct (cited on or before 23-JUN-2016)  Abnormal ECG  When compared with ECG of 16-JAN-2024 10:43, (unconfirmed)  Premature atrial complexes are no longer Present  Confirmed by Margo Perez ([id number]) on 1/17/2024 11:58:02 AM        Anesthesia Plan    History of general anesthesia?: yes  History of complications of general anesthesia?: no    ASA 2     general     The patient is not a current smoker.    intravenous induction   Anesthetic plan and risks discussed with patient.    Plan discussed with [facility].

## 2024-01-17 NOTE — PROGRESS NOTES
Fadumo Corona is a 89 y.o. female on day 1 of admission presenting with Fall, initial encounter.      Subjective   Patient reports her pain is controlled with the meds. Patient denied any chest pain or palpitations today  Daughter is at bedside, reports that her mother has bad knees and that she does not go up and down the stairs     Objective   ROS are negative unless stated otherwise in the HPI    Physical Exam  General: well appearing, no acute distress  HEENT:  moist mucous membranes  CV: regular rate and rhythm, no murmurs, 2+ pulses in all extremities  RESP: CTA bilaterally, normal chest expansion, no resp distress  Abd: soft, nontender, nondistended  Neuro: alert and oriented x3, speech appropriate  Vascular: no peripheral edema appreciated  Skin: no rashes  MSK: no joint swelling    Last Recorded Vitals  /65 (BP Location: Left arm, Patient Position: Lying)   Pulse 57   Temp 36.1 °C (97 °F) (Temporal)   Resp 16   Wt 50.8 kg (112 lb)   SpO2 95%   Intake/Output last 3 Shifts:    Intake/Output Summary (Last 24 hours) at 1/17/2024 1147  Last data filed at 1/17/2024 0305  Gross per 24 hour   Intake 968.75 ml   Output --   Net 968.75 ml       Admission Weight  Weight: 50.8 kg (112 lb) (01/16/24 0956)    Daily Weight  01/16/24 : 50.8 kg (112 lb)    Image Results  Transthoracic Echo (TTE) Complete              [facility] [place]  [address] [id number]     Tel [phone] Fax [phone]    TRANSTHORACIC ECHOCARDIOGRAM REPORT       Patient Name:      FADUMO CORONA      Reading Physician:    [id number] Rocael Montes De Oca MD  Study Date:        1/17/2024             Ordering Provider:    [id number] GINO BROWN  MRN/PID:           [id number]              Fellow:  Accession#:        [id number]          Nurse:  Date of Birth/Age: 11/20/1932 / 89 years  Sonographer:          Eugenia Najera                                                                 [facility]  Gender:            F                     Additional Staff:  Height:            152.00 cm             Admit Date:  Weight:            50.80 kg              Admission Status:     Inpatient -                                                                 Routine  BSA:               1.46 m2               Department Location:  [facility]  Blood Pressure: 121 /54 mmHg    Study Type:    TRANSTHORACIC ECHO (TTE) COMPLETE  Diagnosis/ICD: Elevated Troponin-R79.89; Other chest pain-R07.89  Indication:    elevated troponin; chest pain   Study Detail: The following Echo studies were performed: 2D, M-Mode, Doppler and                color flow.       PHYSICIAN INTERPRETATION:  Left Ventricle: Left ventricular systolic function is normal, with an estimated ejection fraction of 60-65%. Estimated left ventricular mass is increased. There are no regional wall motion abnormalities. The left ventricular cavity size is normal. There is mild concentric left ventricular hypertrophy. Spectral Doppler shows an impaired relaxation pattern of left ventricular diastolic filling.  Left Atrium: The left atrium is normal in size.  Right Ventricle: The right ventricle is normal in size. There is normal right ventricular global systolic function.  Right Atrium: The right atrium is normal in size.  Aortic Valve: The aortic valve appears abnormal. There is trivial aortic valve regurgitation. The peak instantaneous gradient of the aortic valve is 10.8 mmHg. There is aortic sclerosis with normal leaflet mobility.  Mitral Valve: The mitral valve is abnormal. There is mild to moderate mitral annular calcification. There is trace mitral valve regurgitation.  Tricuspid Valve: The tricuspid valve is structurally normal. There is trace tricuspid regurgitation. The right ventricular systolic pressure is unable to be estimated.  Pulmonic Valve: The  pulmonic valve is structurally normal. There is no indication of pulmonic valve regurgitation.  Pericardium: There is no pericardial effusion noted.  Aorta: The aortic root is normal.       CONCLUSIONS:   1. Left ventricular systolic function is normal with a 60-65% estimated ejection fraction.   2. Increased LV mass.   3. Spectral Doppler shows an impaired relaxation pattern of left ventricular diastolic filling.   4. Aortic valve appears abnormal.   5. There is aortic sclerosis with normal leaflet mobility.    QUANTITATIVE DATA SUMMARY:  2D MEASUREMENTS:                            Normal Ranges:  LAs:           3.00 cm    (2.7-4.0cm)  IVSd:          1.30 cm    (0.6-1.1cm)  LVPWd:         1.40 cm    (0.6-1.1cm)  LVIDd:         3.90 cm    (3.9-5.9cm)  LVIDs:         2.70 cm  LV Mass Index: 130.8 g/m2  LV % FS        30.8 %    LA VOLUME:                              Normal Ranges:  LA Area A4C:     12.0 cm2  LA Area A2C:     13.8 cm2  LA Volume Index: 19.9 ml/m2    M-MODE MEASUREMENTS:                   Normal Ranges:  Ao Root: 3.10 cm (2.0-3.7cm)  AoV Exc: 1.40 cm (1.5-2.5cm)    AORTA MEASUREMENTS:                     Normal Ranges:  AoV Exc:   1.40 cm (1.5-2.5cm)  Asc Ao, d: 3.30 cm (2.1-3.4cm)    LV SYSTOLIC FUNCTION BY 2D PLANIMETRY (MOD):                      Normal Ranges:  EF-A4C View: 66.6 % (>=55%)  EF-A2C View: 53.4 %  EF-Biplane:  61.3 %    LV DIASTOLIC FUNCTION:                         Normal Ranges:  MV Peak E:    0.90 m/s (0.7-1.2 m/s)  MV Peak A:    1.15 m/s (0.42-0.7 m/s)  E/A Ratio:    0.78     (1.0-2.2)  MV e'         0.07 m/s (>8.0)  MV lateral e' 0.09 m/s  MV medial e'  0.05 m/s  E/e' Ratio:   12.86    (<8.0)    MITRAL VALVE:                  Normal Ranges:  MV DT: 243 msec (150-240msec)    AORTIC VALVE:                           Normal Ranges:  AoV Vmax:      1.64 m/s  (<=1.7m/s)  AoV Peak PG:   10.8 mmHg (<20mmHg)  LVOT Max Nikita:  1.38 m/s  (<=1.1m/s)  LVOT VTI:      35.10 cm  LVOT Diameter: 1.80  cm   (1.8-2.4cm)  AoV Area,Vmax: 2.14 cm2  (2.5-4.5cm2)    AORTIC INSUFFICIENCY:  AI Vmax:       2.05 m/s  AI Half-time:  487 msec  AI Decel Rate: 123.00 cm/s2       RIGHT VENTRICLE:  RV Basal 2.80 cm  RV Mid   2.20 cm  RV Major 6.3 cm  TAPSE:   20.7 mm  RV s'    0.13 m/s    TRICUSPID VALVE/RVSP:                             Normal Ranges:  Peak TR Velocity: 0.96 m/s  RV Syst Pressure: 6.7 mmHg (< 30mmHg)    PULMONIC VALVE:                          Normal Ranges:  PV Accel Time: 102 msec (>120ms)       [id number] Rocael Montes De Oca MD  Electronically signed on 1/17/2024 at 10:18:22 AM       ** Final **      Physical Exam    Relevant Results  Scheduled medications  amLODIPine, 5 mg, oral, Daily  atenolol, 25 mg, oral, Daily  [Held by provider] enoxaparin, 30 mg, subcutaneous, q24h  hydroxychloroquine, 200 mg, oral, Daily  hydrOXYzine HCL, 25 mg, oral, Nightly  latanoprost, 1 drop, Both Eyes, Nightly  levothyroxine, 100 mcg, oral, Daily before breakfast  montelukast, 10 mg, oral, Nightly  pantoprazole, 40 mg, oral, Daily before breakfast  polyethylene glycol, 17 g, oral, Daily  potassium chloride, 20 mEq, intravenous, q2h  sertraline, 25 mg, oral, Daily      Continuous medications  sodium chloride 0.9%, 75 mL/hr, Last Rate: 75 mL/hr (01/17/24 0305)      PRN medications  PRN medications: acetaminophen, docusate sodium, HYDROmorphone, HYDROmorphone, melatonin, ondansetron   Results for orders placed or performed during the hospital encounter of 01/16/24 (from the past 24 hour(s))   Troponin, High Sensitivity, 1 Hour   Result Value Ref Range    Troponin I, High Sensitivity 153 (HH) 0 - 13 ng/L   Creatine Kinase   Result Value Ref Range    Creatine Kinase 678 (H) 0 - 215 U/L   Comprehensive metabolic panel   Result Value Ref Range    Glucose 107 (H) 74 - 99 mg/dL    Sodium 141 136 - 145 mmol/L    Potassium 4.0 3.5 - 5.3 mmol/L    Chloride 106 98 - 107 mmol/L    Bicarbonate 26 21 - 32 mmol/L    Anion Gap 13 10 - 20 mmol/L    Urea  Nitrogen 23 6 - 23 mg/dL    Creatinine 1.27 (H) 0.50 - 1.05 mg/dL    eGFR 40 (L) >60 mL/min/1.73m*2    Calcium 9.4 8.6 - 10.3 mg/dL    Albumin 4.0 3.4 - 5.0 g/dL    Alkaline Phosphatase 71 33 - 136 U/L    Total Protein 6.5 6.4 - 8.2 g/dL    AST 40 (H) 9 - 39 U/L    Bilirubin, Total 1.5 (H) 0.0 - 1.2 mg/dL    ALT 19 7 - 45 U/L   Magnesium   Result Value Ref Range    Magnesium 1.81 1.60 - 2.40 mg/dL   Urinalysis with Reflex Culture and Microscopic   Result Value Ref Range    Color, Urine Yellow Straw, Yellow    Appearance, Urine Hazy (N) Clear    Specific Gravity, Urine 1.015 1.005 - 1.035    pH, Urine 5.0 5.0, 5.5, 6.0, 6.5, 7.0, 7.5, 8.0    Protein, Urine 30 (1+) (N) NEGATIVE mg/dL    Glucose, Urine NEGATIVE NEGATIVE mg/dL    Blood, Urine MODERATE (2+) (A) NEGATIVE    Ketones, Urine NEGATIVE NEGATIVE mg/dL    Bilirubin, Urine NEGATIVE NEGATIVE    Urobilinogen, Urine <2.0 <2.0 mg/dL    Nitrite, Urine NEGATIVE NEGATIVE    Leukocyte Esterase, Urine NEGATIVE NEGATIVE   Extra Urine Gray Tube   Result Value Ref Range    Extra Tube Hold for add-ons.    Urinalysis Microscopic   Result Value Ref Range    WBC, Urine 1-5 1-5, NONE /HPF    RBC, Urine 6-10 (A) NONE, 1-2, 3-5 /HPF    Squamous Epithelial Cells, Urine 1-9 (SPARSE) Reference range not established. /HPF    Bacteria, Urine 1+ (A) NONE SEEN /HPF    Fine Granular Casts, Urine OCCASIONAL (A) NONE /LPF   ECG 12 lead   Result Value Ref Range    Ventricular Rate 62 BPM    Atrial Rate 62 BPM    UT Interval 182 ms    QRS Duration 98 ms    QT Interval 468 ms    QTC Calculation(Bazett) 475 ms    P Axis 70 degrees    R Axis -71 degrees    T Axis 77 degrees    QRS Count 11 beats    Q Onset 211 ms    P Onset 120 ms    P Offset 173 ms    T Offset 445 ms    QTC Fredericia 473 ms   Troponin I, High Sensitivity   Result Value Ref Range    Troponin I, High Sensitivity 157 (HH) 0 - 13 ng/L   Sars-CoV-2 PCR, Screen Asymptomatic   Result Value Ref Range    Coronavirus 2019, PCR Not  Detected Not Detected   Protime-INR   Result Value Ref Range    Protime 13.0 (H) 9.8 - 12.8 seconds    INR 1.2 (H) 0.9 - 1.1   Type and Screen   Result Value Ref Range    ABO TYPE A     Rh TYPE NEG     ANTIBODY SCREEN NEG    CBC   Result Value Ref Range    WBC 8.2 4.4 - 11.3 x10*3/uL    nRBC 0.0 0.0 - 0.0 /100 WBCs    RBC 3.33 (L) 4.00 - 5.20 x10*6/uL    Hemoglobin 10.6 (L) 12.0 - 16.0 g/dL    Hematocrit 33.2 (L) 36.0 - 46.0 %     80 - 100 fL    MCH 31.8 26.0 - 34.0 pg    MCHC 31.9 (L) 32.0 - 36.0 g/dL    RDW 13.2 11.5 - 14.5 %    Platelets 189 150 - 450 x10*3/uL   Renal Function Panel   Result Value Ref Range    Glucose 94 74 - 99 mg/dL    Sodium 141 136 - 145 mmol/L    Potassium 3.3 (L) 3.5 - 5.3 mmol/L    Chloride 109 (H) 98 - 107 mmol/L    Bicarbonate 21 21 - 32 mmol/L    Anion Gap 14 10 - 20 mmol/L    Urea Nitrogen 24 (H) 6 - 23 mg/dL    Creatinine 1.26 (H) 0.50 - 1.05 mg/dL    eGFR 40 (L) >60 mL/min/1.73m*2    Calcium 8.5 (L) 8.6 - 10.3 mg/dL    Phosphorus 3.3 2.5 - 4.9 mg/dL    Albumin 3.3 (L) 3.4 - 5.0 g/dL   Creatine Kinase   Result Value Ref Range    Creatine Kinase 456 (H) 0 - 215 U/L   Troponin I, High Sensitivity   Result Value Ref Range    Troponin I, High Sensitivity 210 (HH) 0 - 13 ng/L   Transthoracic Echo (TTE) Complete   Result Value Ref Range    AV pk bre 1.64     LV biplane EF 61     LVOT diam 1.80     MV E/A ratio 0.78     MV avg E/e' ratio 12.86     Tricuspid annular plane systolic excursion 2.1     RV free wall pk S' 12.70     RVSP 6.7     LVIDd 3.90     Aortic Valve Area by Continuity of Peak Velocity 2.14     AV pk grad 10.8     LV A4C EF 66.6         Transthoracic Echo (TTE) Complete    Result Date: 1/17/2024            [facility] - [place], [place] [address], [place] [id number]    Tel [phone] Fax [phone] TRANSTHORACIC ECHOCARDIOGRAM REPORT  Patient Name:      LUPE CADET      Reading Physician:    [id number] Rocael                                                                 Falguni BARBOSA Study Date:        1/17/2024             Ordering Provider:    [id number] GINO BROWN MRN/PID:           [id number]              Fellow: Accession#:        [id number]          Nurse: Date of Birth/Age: 11/20/1932 / 89 years Sonographer:          Eugenia Najera                                                                [facility] Gender:            F                     Additional Staff: Height:            152.00 cm             Admit Date: Weight:            50.80 kg              Admission Status:     Inpatient -                                                                Routine BSA:               1.46 m2               Department Location:  [facility] Echo Lab Blood Pressure: 121 /54 mmHg Study Type:    TRANSTHORACIC ECHO (TTE) COMPLETE Diagnosis/ICD: Elevated Troponin-R79.89; Other chest pain-R07.89 Indication:    elevated troponin; chest pain  Study Detail: The following Echo studies were performed: 2D, M-Mode, Doppler and               color flow.  PHYSICIAN INTERPRETATION: Left Ventricle: Left ventricular systolic function is normal, with an estimated ejection fraction of 60-65%. Estimated left ventricular mass is increased. There are no regional wall motion abnormalities. The left ventricular cavity size is normal. There is mild concentric left ventricular hypertrophy. Spectral Doppler shows an impaired relaxation pattern of left ventricular diastolic filling. Left Atrium: The left atrium is normal in size. Right Ventricle: The right ventricle is normal in size. There is normal right ventricular global systolic function. Right Atrium: The right atrium is normal in size. Aortic Valve: The aortic valve appears abnormal. There is trivial aortic valve regurgitation. The peak instantaneous gradient of the aortic valve is 10.8 mmHg. There is aortic sclerosis with normal leaflet mobility. Mitral Valve: The mitral valve is abnormal. There is  mild to moderate mitral annular calcification. There is trace mitral valve regurgitation. Tricuspid Valve: The tricuspid valve is structurally normal. There is trace tricuspid regurgitation. The right ventricular systolic pressure is unable to be estimated. Pulmonic Valve: The pulmonic valve is structurally normal. There is no indication of pulmonic valve regurgitation. Pericardium: There is no pericardial effusion noted. Aorta: The aortic root is normal.  CONCLUSIONS:  1. Left ventricular systolic function is normal with a 60-65% estimated ejection fraction.  2. Increased LV mass.  3. Spectral Doppler shows an impaired relaxation pattern of left ventricular diastolic filling.  4. Aortic valve appears abnormal.  5. There is aortic sclerosis with normal leaflet mobility. QUANTITATIVE DATA SUMMARY: 2D MEASUREMENTS:                           Normal Ranges: LAs:           3.00 cm    (2.7-4.0cm) IVSd:          1.30 cm    (0.6-1.1cm) LVPWd:         1.40 cm    (0.6-1.1cm) LVIDd:         3.90 cm    (3.9-5.9cm) LVIDs:         2.70 cm LV Mass Index: 130.8 g/m2 LV % FS        30.8 % LA VOLUME:                             Normal Ranges: LA Area A4C:     12.0 cm2 LA Area A2C:     13.8 cm2 LA Volume Index: 19.9 ml/m2 M-MODE MEASUREMENTS:                  Normal Ranges: Ao Root: 3.10 cm (2.0-3.7cm) AoV Exc: 1.40 cm (1.5-2.5cm) AORTA MEASUREMENTS:                    Normal Ranges: AoV Exc:   1.40 cm (1.5-2.5cm) Asc Ao, d: 3.30 cm (2.1-3.4cm) LV SYSTOLIC FUNCTION BY 2D PLANIMETRY (MOD):                     Normal Ranges: EF-A4C View: 66.6 % (>=55%) EF-A2C View: 53.4 % EF-Biplane:  61.3 % LV DIASTOLIC FUNCTION:                        Normal Ranges: MV Peak E:    0.90 m/s (0.7-1.2 m/s) MV Peak A:    1.15 m/s (0.42-0.7 m/s) E/A Ratio:    0.78     (1.0-2.2) MV e'         0.07 m/s (>8.0) MV lateral e' 0.09 m/s MV medial e'  0.05 m/s E/e' Ratio:   12.86    (<8.0) MITRAL VALVE:                 Normal Ranges: MV DT: 243 msec (150-240msec)  AORTIC VALVE:                          Normal Ranges: AoV Vmax:      1.64 m/s  (<=1.7m/s) AoV Peak PG:   10.8 mmHg (<20mmHg) LVOT Max Nikita:  1.38 m/s  (<=1.1m/s) LVOT VTI:      35.10 cm LVOT Diameter: 1.80 cm   (1.8-2.4cm) AoV Area,Vmax: 2.14 cm2  (2.5-4.5cm2) AORTIC INSUFFICIENCY: AI Vmax:       2.05 m/s AI Half-time:  487 msec AI Decel Rate: 123.00 cm/s2  RIGHT VENTRICLE: RV Basal 2.80 cm RV Mid   2.20 cm RV Major 6.3 cm TAPSE:   20.7 mm RV s'    0.13 m/s TRICUSPID VALVE/RVSP:                            Normal Ranges: Peak TR Velocity: 0.96 m/s RV Syst Pressure: 6.7 mmHg (< 30mmHg) PULMONIC VALVE:                         Normal Ranges: PV Accel Time: 102 msec (>120ms)  [id number] Rocael Montes De Oca MD Electronically signed on 1/17/2024 at 10:18:22 AM  ** Final **     ECG 12 lead    Result Date: 1/16/2024  Normal sinus rhythm Incomplete right bundle branch block Left anterior fascicular block Left ventricular hypertrophy with repolarization abnormality Cannot rule out Septal infarct (cited on or before 23-JUN-2016) Abnormal ECG When compared with ECG of 16-JAN-2024 10:43, (unconfirmed) Premature atrial complexes are no longer Present    XR femur right 2+ views    Result Date: 1/16/2024  Interpreted By:  Zay Selby, STUDY: XR FEMUR RIGHT 2+ VIEWS; XR PELVIS 1-2 VIEWS; ;  1/16/2024 12:32 pm   INDICATION: Signs/Symptoms:fall.   COMPARISON: None.   ACCESSION NUMBER(S): [id number]; [id number]   ORDERING CLINICIAN: JACQUELYN GARIBAY   FINDINGS: Four views of the right femur and single frontal view the pelvis. There is a impacted and mildly proximally retracted fracture of the humeral neck. No hip dislocation. Partially imaged right knee total arthroplasty without acute hardware complication.       Acute appearing fracture of the femoral neck.     MACRO: None   Signed by: Zay Selby 1/16/2024 12:42 PM Dictation workstation:   [id number]    XR pelvis 1-2 views    Result Date: 1/16/2024  Interpreted By:  Zay Selby, STUDY:  XR FEMUR RIGHT 2+ VIEWS; XR PELVIS 1-2 VIEWS; ;  1/16/2024 12:32 pm   INDICATION: Signs/Symptoms:fall.   COMPARISON: None.   ACCESSION NUMBER(S): [id number]; [id number]   ORDERING CLINICIAN: JACQUELYN GARIBAY   FINDINGS: Four views of the right femur and single frontal view the pelvis. There is a impacted and mildly proximally retracted fracture of the humeral neck. No hip dislocation. Partially imaged right knee total arthroplasty without acute hardware complication.       Acute appearing fracture of the femoral neck.     MACRO: None   Signed by: Zay Selby 1/16/2024 12:42 PM Dictation workstation:   [id number]    CT abdomen pelvis wo IV contrast    Result Date: 1/16/2024  Interpreted By:  Cortez Horowitz, STUDY: CT ABDOMEN PELVIS WO IV CONTRAST;  1/16/2024 11:04 am   INDICATION: Signs/Symptoms:fall with abdominal pain.   COMPARISON: 03/31/2014   ACCESSION NUMBER(S): [id number]   ORDERING CLINICIAN: JACQUELYN GARIBAY   TECHNIQUE: CT of the abdomen and pelvis was performed. Contiguous axial images were obtained at 3 mm slice thickness through the abdomen and pelvis. Coronal and sagittal reconstructions at 3 mm slice thickness were performed.  No intravenous contrast was administered.   FINDINGS: Please note that the evaluation of vessels, lymph nodes and organs is limited without intravenous contrast.   LOWER CHEST: No bile duct dilatation.   ABDOMEN:   LIVER: The liver is grossly unremarkable. No definite focal masses within the limits of this unenhanced study.   BILE DUCTS: Prominence of the common bile duct may be due to prior cholecystectomy. Correlate with clinical laboratory findings.       GALLBLADDER: The gallbladder is not seen and may be surgically absent.   PANCREAS: Unremarkable pancreas.   SPLEEN: Unremarkable spleen.   ADRENAL GLANDS: The adrenal masses.   KIDNEYS AND URETERS: Atrophic right kidney. Likely parapelvic left renal cysts. No hydronephrosis.   PELVIS:   BLADDER: Grossly unremarkable.    REPRODUCTIVE ORGANS: No definite masses.   BOWEL: No evidence of bowel obstruction. There is sigmoid diverticulosis. Minimal fat stranding in the left lower quadrant. Correlate with concern for minimal acute diverticulitis. No free fluid abscess.   VESSELS: Aortic calcification. No aneurysmal dilatation.   PERITONEUM/RETROPERITONEUM/LYMPH NODES: No retroperitoneal adenopathy. No ascites. ABDOMINAL WALL: Grossly unremarkable.   BONES: Grade 1 anterolisthesis of L3 on L4. Significant compression fracture deformity of T11 of indeterminate age. There is associated retropulsion. This is new since the prior examination of 03/31/2014. Slightly displaced right femoral neck fracture of indeterminate age. This is new since the prior examination of 03/21/2023.       Significant compression fracture deformity of T11 vertebral body of indeterminate age. This may be acute. There is associated retropulsion. Consider MRI to better evaluate central canal and neural foraminal as clinically warranted.   Displaced right femoral neck fracture of indeterminate age. This may be acute.   Small to moderate hiatal hernia.   Prominence of the common bile duct could be due to prior cholecystectomy. Correlate with clinical and laboratory findings.   Asymmetrically smaller right kidney.   Sigmoid diverticulosis with question of minimal fat stranding. Can exclude minimal acute diverticulitis. No free fluid or abscess.     MACRO: None   Signed by: Cortez Horowitz 1/16/2024 11:59 AM Dictation workstation:   [id number]    ECG 12 lead    Result Date: 1/16/2024  Sinus rhythm with Premature atrial complexes Incomplete right bundle branch block Left anterior fascicular block Left ventricular hypertrophy with repolarization abnormality Cannot rule out Septal infarct (cited on or before 23-JUN-2016) Abnormal ECG When compared with ECG of 23-JUN-2016 06:41, Premature atrial complexes are now Present Questionable change in initial forces of Septal leads    CT  cervical spine wo IV contrast    Result Date: 1/16/2024  Interpreted By:  Cortez Horowitz, STUDY: CT CERVICAL SPINE WO IV CONTRAST;  1/16/2024 11:04 am   INDICATION: Signs/Symptoms:fall.   COMPARISON: 03/21/2023   ACCESSION NUMBER(S): [id number]   ORDERING CLINICIAN: MELISSA LANDEROS   TECHNIQUE: Axial CT images of the cervical spine are obtained. Axial, coronal and sagittal reconstructions are provided for review.   FINDINGS: Grade 1 anterolisthesis of C3 on C4. Grade 1 anterolisthesis of C7 on T1. Disc space loss at C5/C6. Endplate sclerosis and osteophytes are seen at multiple levels. No erosions. No acute fracture-dislocation.       No evidence for an acute fracture or subluxation of the cervical spine. Discogenic degenerative changes.   MACRO: None   Signed by: Cortez Horowitz 1/16/2024 11:51 AM Dictation workstation:   [id number]    XR chest 1 view    Result Date: 1/16/2024  Interpreted By:  Cortez Horowitz, STUDY: XR CHEST 1 VIEW;  1/16/2024 11:16 am   INDICATION: Signs/Symptoms:fall.   COMPARISON: 03/21/2023   ACCESSION NUMBER(S): [id number]   ORDERING CLINICIAN: MELISSA LANDEROS   FINDINGS:         CARDIOMEDIASTINAL SILHOUETTE: Cardiomediastinal silhouette is normal in size and configuration.   LUNGS: Lungs are clear.   ABDOMEN: No remarkable upper abdominal findings.   BONES: Degenerative changes of both shoulders..       1.  No evidence of acute cardiopulmonary process.       MACRO: None   Signed by: Cortez Horowitz 1/16/2024 11:47 AM Dictation workstation:   [id number]    CT head wo IV contrast    Result Date: 1/16/2024  Interpreted By:  Cortez Horowitz, STUDY: CT HEAD WO IV CONTRAST;  1/16/2024 11:04 am   INDICATION: Signs/Symptoms:fall.   COMPARISON: 03/21/2023   ACCESSION NUMBER(S): [id number]   ORDERING CLINICIAN: ARUBA LANDEROS   TECHNIQUE: Noncontrast axial CT scan of head was performed. Angled reformats in brain and bone windows were generated. The images were reviewed in bone, brain, blood and soft tissue windows.    FINDINGS: The ventricles, cisterns and sulci are prominent, consistent with mild diffuse volume loss. There are areas of nonspecific white matter hypodensity, which are probably age-related or microvascular in nature.   Gray-white differentiation is intact and there is no evidence of acute cortical infarct. 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 No mass, mass effect or midline shift is seen. There is no evidence of hemorrhage.   The visualized paranasal sinuses are clear.       No evidence of acute cortical infarct or intracranial hemorrhage.   Stable chronic changes.   MACRO: None   Signed by: Cortez Horowitz 1/16/2024 11:45 AM Dictation workstation:   [id number]          Assessment/Plan   A 89 y.o. female presenting with a pmhx of of esophageal strictures, collagen vascular disease, vocal cord dysfunction, GERD, CKD 3 hypothyroidism, hypertension and hyperlipidemia who presents to [facility] via EMS for a unwitnessed fall and dizziness. Per patient, patient was in the bathroom when she lost her balance. Patient is unable to remember if she hit her head. However, patient states she was unable to get up from the fall since yesterday at night and slept on the floor. Patient currently rates her pain 3 out of 10 right sided leg/hip pain. Patient denied any red flag symptoms such as lost of bladder/bowel or numbness or tingling. Patient currently, denied any nausea, vomiting, abd pain, HA's or chest pain. Patient reports 2 years ago she fell backyards and since then has had chronic back pain.      #Unwitnessed fall; Acute Displaced Right Femoral Neck Fracture   #T11 compression fracture near vertebral plana  #Elevated troponin, uptrendeding  #Elevated CK  #leukocytosis (likely reactive)    -Ortho consulted; plan for hip arthroplasty once cardiac clearance is done  -Neurosurgery consulted; no cute neurosurgical intervention planned, LSO brace recommended as well as spine  precautions during surgery  -Cards consulted; appreciate recommendations for cardiac clearance; uptrend troponin to 210 this morning. Echo revealed: EF 60-65%  -Keep potassium >4, Phos >3 and Mg >2  -Continue with maintaince fluids @75 ml/hr  -Added Prn pain medications: Mild acetaminophen, moderate/severe IV Dilaudid 0.2 and .4  -Diet: NPO after midnight for possible surgery   -PRN Zofran for nausea     #Esophageal strictures  #Collagen vascular disease  #Vocal cord dysfunction  #GERD  #CKD 3  #Hypothyroidism  #Hypertension  #Hyperlipidemia  - will resume home medications      Fluids: IV Fluids  Diet: NPO after midnight   Consults: Neurosurgery/Ortho/Cards, PT/OT  Code: DNR/DNI  Tele-monitoring   DVT ppx: holding Lovenox; PCDs     Dispo: Admitted for acute right femoral fracture; waiting cardiac clearance before hip arthroplasty for fracture      Colin Perez DO  Internal Medicine, PGY3  Case and plan discussed with attending      Principal Problem:    Fall, initial encounter              Principal Problem:    Fall, initial encounter  Active Problems:    Closed displaced subtrochanteric fracture of right femur (CMS/HCC)      Ana Shaw DO  Internal Medicine, PGY3  Case and plan discussed with attending

## 2024-01-18 ENCOUNTER — APPOINTMENT (OUTPATIENT)
Dept: RADIOLOGY | Facility: HOSPITAL | Age: 89
DRG: 522 | End: 2024-01-18
Payer: MEDICARE

## 2024-01-18 LAB
ALBUMIN SERPL BCP-MCNC: 3.2 G/DL (ref 3.4–5)
ANION GAP SERPL CALC-SCNC: 15 MMOL/L (ref 10–20)
BACTERIA UR CULT: NO GROWTH
BUN SERPL-MCNC: 32 MG/DL (ref 6–23)
CALCIUM SERPL-MCNC: 8.2 MG/DL (ref 8.6–10.3)
CHLORIDE SERPL-SCNC: 111 MMOL/L (ref 98–107)
CO2 SERPL-SCNC: 19 MMOL/L (ref 21–32)
CREAT SERPL-MCNC: 1.32 MG/DL (ref 0.5–1.05)
EGFRCR SERPLBLD CKD-EPI 2021: 38 ML/MIN/1.73M*2
ERYTHROCYTE [DISTWIDTH] IN BLOOD BY AUTOMATED COUNT: 13.2 % (ref 11.5–14.5)
GLUCOSE SERPL-MCNC: 79 MG/DL (ref 74–99)
HCT VFR BLD AUTO: 34.3 % (ref 36–46)
HGB BLD-MCNC: 10.8 G/DL (ref 12–16)
MCH RBC QN AUTO: 32.2 PG (ref 26–34)
MCHC RBC AUTO-ENTMCNC: 31.5 G/DL (ref 32–36)
MCV RBC AUTO: 102 FL (ref 80–100)
NRBC BLD-RTO: 0 /100 WBCS (ref 0–0)
PHOSPHATE SERPL-MCNC: 4.5 MG/DL (ref 2.5–4.9)
PLATELET # BLD AUTO: 183 X10*3/UL (ref 150–450)
POTASSIUM SERPL-SCNC: 4.1 MMOL/L (ref 3.5–5.3)
RBC # BLD AUTO: 3.35 X10*6/UL (ref 4–5.2)
SODIUM SERPL-SCNC: 141 MMOL/L (ref 136–145)
WBC # BLD AUTO: 8.5 X10*3/UL (ref 4.4–11.3)

## 2024-01-18 PROCEDURE — 36415 COLL VENOUS BLD VENIPUNCTURE: CPT

## 2024-01-18 PROCEDURE — 97161 PT EVAL LOW COMPLEX 20 MIN: CPT | Mod: GP

## 2024-01-18 PROCEDURE — 1200000002 HC GENERAL ROOM WITH TELEMETRY DAILY

## 2024-01-18 PROCEDURE — 99232 SBSQ HOSP IP/OBS MODERATE 35: CPT

## 2024-01-18 PROCEDURE — 2500000004 HC RX 250 GENERAL PHARMACY W/ HCPCS (ALT 636 FOR OP/ED)

## 2024-01-18 PROCEDURE — 2500000001 HC RX 250 WO HCPCS SELF ADMINISTERED DRUGS (ALT 637 FOR MEDICARE OP)

## 2024-01-18 PROCEDURE — 97530 THERAPEUTIC ACTIVITIES: CPT | Mod: GP

## 2024-01-18 PROCEDURE — 99232 SBSQ HOSP IP/OBS MODERATE 35: CPT | Performed by: PHYSICIAN ASSISTANT

## 2024-01-18 PROCEDURE — 80069 RENAL FUNCTION PANEL: CPT

## 2024-01-18 PROCEDURE — 97166 OT EVAL MOD COMPLEX 45 MIN: CPT | Mod: GO | Performed by: OCCUPATIONAL THERAPIST

## 2024-01-18 PROCEDURE — 2500000001 HC RX 250 WO HCPCS SELF ADMINISTERED DRUGS (ALT 637 FOR MEDICARE OP): Performed by: INTERNAL MEDICINE

## 2024-01-18 PROCEDURE — 2500000004 HC RX 250 GENERAL PHARMACY W/ HCPCS (ALT 636 FOR OP/ED): Performed by: INTERNAL MEDICINE

## 2024-01-18 PROCEDURE — 73502 X-RAY EXAM HIP UNI 2-3 VIEWS: CPT | Mod: RIGHT SIDE | Performed by: RADIOLOGY

## 2024-01-18 PROCEDURE — 85027 COMPLETE CBC AUTOMATED: CPT

## 2024-01-18 PROCEDURE — 73502 X-RAY EXAM HIP UNI 2-3 VIEWS: CPT | Mod: RT

## 2024-01-18 PROCEDURE — 97535 SELF CARE MNGMENT TRAINING: CPT | Mod: GO | Performed by: OCCUPATIONAL THERAPIST

## 2024-01-18 RX ORDER — ENOXAPARIN SODIUM 100 MG/ML
30 INJECTION SUBCUTANEOUS EVERY 24 HOURS
Status: DISCONTINUED | OUTPATIENT
Start: 2024-01-18 | End: 2024-01-21 | Stop reason: HOSPADM

## 2024-01-18 RX ORDER — TRAMADOL HYDROCHLORIDE 50 MG/1
50 TABLET ORAL EVERY 8 HOURS PRN
Status: DISCONTINUED | OUTPATIENT
Start: 2024-01-18 | End: 2024-01-21 | Stop reason: HOSPADM

## 2024-01-18 RX ORDER — SODIUM CHLORIDE, SODIUM LACTATE, POTASSIUM CHLORIDE, CALCIUM CHLORIDE 600; 310; 30; 20 MG/100ML; MG/100ML; MG/100ML; MG/100ML
100 INJECTION, SOLUTION INTRAVENOUS CONTINUOUS
Status: ACTIVE | OUTPATIENT
Start: 2024-01-18 | End: 2024-01-18

## 2024-01-18 RX ADMIN — LEVOTHYROXINE SODIUM 100 MCG: 0.1 TABLET ORAL at 06:30

## 2024-01-18 RX ADMIN — POLYETHYLENE GLYCOL 3350 17 G: 17 POWDER, FOR SOLUTION ORAL at 09:46

## 2024-01-18 RX ADMIN — ATENOLOL 25 MG: 25 TABLET ORAL at 09:35

## 2024-01-18 RX ADMIN — SERTRALINE HYDROCHLORIDE 25 MG: 25 TABLET ORAL at 09:35

## 2024-01-18 RX ADMIN — PANTOPRAZOLE SODIUM 40 MG: 40 TABLET, DELAYED RELEASE ORAL at 06:30

## 2024-01-18 RX ADMIN — SODIUM CHLORIDE 75 ML/HR: 9 INJECTION, SOLUTION INTRAVENOUS at 08:42

## 2024-01-18 RX ADMIN — AMLODIPINE BESYLATE 5 MG: 5 TABLET ORAL at 09:35

## 2024-01-18 RX ADMIN — MONTELUKAST 10 MG: 10 TABLET, FILM COATED ORAL at 22:08

## 2024-01-18 RX ADMIN — HYDROXYZINE HYDROCHLORIDE 25 MG: 25 TABLET, FILM COATED ORAL at 22:08

## 2024-01-18 RX ADMIN — SODIUM CHLORIDE, POTASSIUM CHLORIDE, SODIUM LACTATE AND CALCIUM CHLORIDE 100 ML/HR: 600; 310; 30; 20 INJECTION, SOLUTION INTRAVENOUS at 10:12

## 2024-01-18 RX ADMIN — ENOXAPARIN SODIUM 30 MG: 30 INJECTION SUBCUTANEOUS at 14:18

## 2024-01-18 RX ADMIN — TRAMADOL HYDROCHLORIDE 50 MG: 50 TABLET, COATED ORAL at 16:52

## 2024-01-18 RX ADMIN — HYDROXYCHLOROQUINE SULFATE 200 MG: 200 TABLET, FILM COATED ORAL at 09:45

## 2024-01-18 RX ADMIN — HYDROMORPHONE HYDROCHLORIDE 0.2 MG: 0.2 INJECTION, SOLUTION INTRAMUSCULAR; INTRAVENOUS; SUBCUTANEOUS at 09:44

## 2024-01-18 ASSESSMENT — COGNITIVE AND FUNCTIONAL STATUS - GENERAL
STANDING UP FROM CHAIR USING ARMS: A LOT
DAILY ACTIVITIY SCORE: 13
CLIMB 3 TO 5 STEPS WITH RAILING: TOTAL
MOBILITY SCORE: 11
WALKING IN HOSPITAL ROOM: A LOT
WALKING IN HOSPITAL ROOM: A LOT
EATING MEALS: A LITTLE
DAILY ACTIVITIY SCORE: 12
PERSONAL GROOMING: A LITTLE
TURNING FROM BACK TO SIDE WHILE IN FLAT BAD: A LOT
HELP NEEDED FOR BATHING: A LOT
DRESSING REGULAR UPPER BODY CLOTHING: A LITTLE
MOVING TO AND FROM BED TO CHAIR: A LOT
CLIMB 3 TO 5 STEPS WITH RAILING: TOTAL
MOVING FROM LYING ON BACK TO SITTING ON SIDE OF FLAT BED WITH BEDRAILS: A LOT
TOILETING: A LOT
EATING MEALS: A LITTLE
MOBILITY SCORE: 11
MOVING FROM LYING ON BACK TO SITTING ON SIDE OF FLAT BED WITH BEDRAILS: A LOT
HELP NEEDED FOR BATHING: TOTAL
TOILETING: TOTAL
MOVING TO AND FROM BED TO CHAIR: A LOT
DRESSING REGULAR LOWER BODY CLOTHING: A LOT
PERSONAL GROOMING: A LOT
STANDING UP FROM CHAIR USING ARMS: A LOT
TURNING FROM BACK TO SIDE WHILE IN FLAT BAD: A LOT
DRESSING REGULAR LOWER BODY CLOTHING: TOTAL
DRESSING REGULAR UPPER BODY CLOTHING: A LOT

## 2024-01-18 ASSESSMENT — PAIN SCALES - GENERAL
PAINLEVEL_OUTOF10: 3
PAINLEVEL_OUTOF10: 1
PAINLEVEL_OUTOF10: 0 - NO PAIN
PAINLEVEL_OUTOF10: 4

## 2024-01-18 ASSESSMENT — PAIN - FUNCTIONAL ASSESSMENT
PAIN_FUNCTIONAL_ASSESSMENT: 0-10

## 2024-01-18 ASSESSMENT — ACTIVITIES OF DAILY LIVING (ADL)
ADL_ASSISTANCE: INDEPENDENT
HOME_MANAGEMENT_TIME_ENTRY: 13

## 2024-01-18 NOTE — PROGRESS NOTES
"Fadumo Corona is a 89 y.o. female on day 2 of admission presenting with Fall, initial encounter.      Subjective   Overnight/morning: on 2 L oxygen > titrated off to RA with sat >92%  Patient reports she does have pain on her right leg. Surgery went well overall. Has not passed gas yet or have a bowel movement today. Patient states she just started eating down. Patient reports she follows with [facility] of \"decreased voice'. Patient denies any cp, sob, nausea, fever or  chills    Objective   ROS are negative unless stated otherwise in the HPI    Last Recorded Vitals  /63 (BP Location: Right arm, Patient Position: Lying)   Pulse 54   Temp 36.1 °C (97 °F) (Temporal)   Resp 16   Wt 50.8 kg (112 lb)   SpO2 97%   Intake/Output last 3 Shifts:    Intake/Output Summary (Last 24 hours) at 1/18/2024 1145  Last data filed at 1/18/2024 1130  Gross per 24 hour   Intake 2983.75 ml   Output 575 ml   Net 2408.75 ml         Admission Weight  Weight: 50.8 kg (112 lb) (01/16/24 0956)    Daily Weight  01/16/24 : 50.8 kg (112 lb)    Image Results  ECG 12 lead  Sinus rhythm with Premature atrial complexes  Incomplete right bundle branch block  Left anterior fascicular block  Left ventricular hypertrophy with repolarization abnormality  Cannot rule out Septal infarct (cited on or before 23-JUN-2016)  Abnormal ECG  When compared with ECG of 23-JUN-2016 06:41,  Premature atrial complexes are now Present  Questionable change in initial forces of Septal leads  Confirmed by Margo Perez ([id number]) on 1/17/2024 11:58:36 AM  ECG 12 lead  Normal sinus rhythm  Incomplete right bundle branch block  Left anterior fascicular block  Left ventricular hypertrophy with repolarization abnormality  Cannot rule out Septal infarct (cited on or before 23-JUN-2016)  Abnormal ECG  When compared with ECG of 16-JAN-2024 10:43, (unconfirmed)  Premature atrial complexes are no longer Present  Confirmed by Margo Perez ([id number]) on 1/17/2024 " 11:58:02 AM  Transthoracic Echo (TTE) Complete              [facility]  [address], [facility] [address]     Tel [phone] Fax [phone]    TRANSTHORACIC ECHOCARDIOGRAM REPORT       Patient Name:      LUPE WOODARD CHICHI      Reading Physician:    [id number] Rocael Montes De Oca MD  Study Date:        1/17/2024             Ordering Provider:    [id number] GINO BROWN  MRN/PID:           [id number]              Fellow:  Accession#:        [id number]          Nurse:  Date of Birth/Age: 11/20/1932 / 89 years Sonographer:          Eugenia Najera RDCS  Gender:            F                     Additional Staff:  Height:            152.00 cm             Admit Date:  Weight:            50.80 kg              Admission Status:     Inpatient -                                                                 Routine  BSA:               1.46 m2               Department Location:  [facility] Echo Lab  Blood Pressure: 121 /54 mmHg    Study Type:    TRANSTHORACIC ECHO (TTE) COMPLETE  Diagnosis/ICD: Elevated Troponin-R79.89; Other chest pain-R07.89  Indication:    elevated troponin; chest pain   Study Detail: The following Echo studies were performed: 2D, M-Mode, Doppler and                color flow.       PHYSICIAN INTERPRETATION:  Left Ventricle: Left ventricular systolic function is normal, with an estimated ejection fraction of 60-65%. Estimated left ventricular mass is increased. There are no regional wall motion abnormalities. The left ventricular cavity size is normal. There is mild concentric left ventricular hypertrophy. Spectral Doppler shows an impaired relaxation pattern of left ventricular diastolic filling.  Left Atrium: The left atrium is normal in size.  Right Ventricle: The right ventricle is normal in size. There is  normal right ventricular global systolic function.  Right Atrium: The right atrium is normal in size.  Aortic Valve: The aortic valve appears abnormal. There is trivial aortic valve regurgitation. The peak instantaneous gradient of the aortic valve is 10.8 mmHg. There is aortic sclerosis with normal leaflet mobility.  Mitral Valve: The mitral valve is abnormal. There is mild to moderate mitral annular calcification. There is trace mitral valve regurgitation.  Tricuspid Valve: The tricuspid valve is structurally normal. There is trace tricuspid regurgitation. The right ventricular systolic pressure is unable to be estimated.  Pulmonic Valve: The pulmonic valve is structurally normal. There is no indication of pulmonic valve regurgitation.  Pericardium: There is no pericardial effusion noted.  Aorta: The aortic root is normal.       CONCLUSIONS:   1. Left ventricular systolic function is normal with a 60-65% estimated ejection fraction.   2. Increased LV mass.   3. Spectral Doppler shows an impaired relaxation pattern of left ventricular diastolic filling.   4. Aortic valve appears abnormal.   5. There is aortic sclerosis with normal leaflet mobility.    QUANTITATIVE DATA SUMMARY:  2D MEASUREMENTS:                            Normal Ranges:  LAs:           3.00 cm    (2.7-4.0cm)  IVSd:          1.30 cm    (0.6-1.1cm)  LVPWd:         1.40 cm    (0.6-1.1cm)  LVIDd:         3.90 cm    (3.9-5.9cm)  LVIDs:         2.70 cm  LV Mass Index: 130.8 g/m2  LV % FS        30.8 %    LA VOLUME:                              Normal Ranges:  LA Area A4C:     12.0 cm2  LA Area A2C:     13.8 cm2  LA Volume Index: 19.9 ml/m2    M-MODE MEASUREMENTS:                   Normal Ranges:  Ao Root: 3.10 cm (2.0-3.7cm)  AoV Exc: 1.40 cm (1.5-2.5cm)    AORTA MEASUREMENTS:                     Normal Ranges:  AoV Exc:   1.40 cm (1.5-2.5cm)  Asc Ao, d: 3.30 cm (2.1-3.4cm)    LV SYSTOLIC FUNCTION BY 2D PLANIMETRY (MOD):                      Normal  Ranges:  EF-A4C View: 66.6 % (>=55%)  EF-A2C View: 53.4 %  EF-Biplane:  61.3 %    LV DIASTOLIC FUNCTION:                         Normal Ranges:  MV Peak E:    0.90 m/s (0.7-1.2 m/s)  MV Peak A:    1.15 m/s (0.42-0.7 m/s)  E/A Ratio:    0.78     (1.0-2.2)  MV e'         0.07 m/s (>8.0)  MV lateral e' 0.09 m/s  MV medial e'  0.05 m/s  E/e' Ratio:   12.86    (<8.0)    MITRAL VALVE:                  Normal Ranges:  MV DT: 243 msec (150-240msec)    AORTIC VALVE:                           Normal Ranges:  AoV Vmax:      1.64 m/s  (<=1.7m/s)  AoV Peak PG:   10.8 mmHg (<20mmHg)  LVOT Max Nikita:  1.38 m/s  (<=1.1m/s)  LVOT VTI:      35.10 cm  LVOT Diameter: 1.80 cm   (1.8-2.4cm)  AoV Area,Vmax: 2.14 cm2  (2.5-4.5cm2)    AORTIC INSUFFICIENCY:  AI Vmax:       2.05 m/s  AI Half-time:  487 msec  AI Decel Rate: 123.00 cm/s2       RIGHT VENTRICLE:  RV Basal 2.80 cm  RV Mid   2.20 cm  RV Major 6.3 cm  TAPSE:   20.7 mm  RV s'    0.13 m/s    TRICUSPID VALVE/RVSP:                             Normal Ranges:  Peak TR Velocity: 0.96 m/s  RV Syst Pressure: 6.7 mmHg (< 30mmHg)    PULMONIC VALVE:                          Normal Ranges:  PV Accel Time: 102 msec (>120ms)       [id number] Rocael Montes De Oca MD  Electronically signed on 1/17/2024 at 10:18:22 AM       ** Final **      Physical Exam  Physical Exam  General: well appearing, no acute distress  HEENT:  moist mucous membranes  CV: regular rate and rhythm, no murmurs, 2+ pulses in all extremities  RESP: CTA bilaterally, normal chest expansion, no resp distress  Abd: soft, nontender, nondistended  Neuro: alert and oriented x3, speech appropriate  Vascular: no peripheral edema appreciated  Skin: no rashes  MSK: no joint swelling    Relevant Results  Scheduled medications  amLODIPine, 5 mg, oral, Daily  atenolol, 25 mg, oral, Daily  hydroxychloroquine, 200 mg, oral, Daily  hydrOXYzine HCL, 25 mg, oral, Nightly  latanoprost, 1 drop, Both Eyes, Nightly  levothyroxine, 100 mcg, oral, Daily before  breakfast  montelukast, 10 mg, oral, Nightly  pantoprazole, 40 mg, oral, Daily before breakfast  polyethylene glycol, 17 g, oral, Daily  sertraline, 25 mg, oral, Daily      Continuous medications  lactated Ringer's, 100 mL/hr, Last Rate: 100 mL/hr (01/18/24 1130)      PRN medications  PRN medications: acetaminophen, docusate sodium, HYDROmorphone, melatonin   Results for orders placed or performed during the hospital encounter of 01/16/24 (from the past 24 hour(s))   CBC   Result Value Ref Range    WBC 8.5 4.4 - 11.3 x10*3/uL    nRBC 0.0 0.0 - 0.0 /100 WBCs    RBC 3.35 (L) 4.00 - 5.20 x10*6/uL    Hemoglobin 10.8 (L) 12.0 - 16.0 g/dL    Hematocrit 34.3 (L) 36.0 - 46.0 %     (H) 80 - 100 fL    MCH 32.2 26.0 - 34.0 pg    MCHC 31.5 (L) 32.0 - 36.0 g/dL    RDW 13.2 11.5 - 14.5 %    Platelets 183 150 - 450 x10*3/uL   Renal Function Panel   Result Value Ref Range    Glucose 79 74 - 99 mg/dL    Sodium 141 136 - 145 mmol/L    Potassium 4.1 3.5 - 5.3 mmol/L    Chloride 111 (H) 98 - 107 mmol/L    Bicarbonate 19 (L) 21 - 32 mmol/L    Anion Gap 15 10 - 20 mmol/L    Urea Nitrogen 32 (H) 6 - 23 mg/dL    Creatinine 1.32 (H) 0.50 - 1.05 mg/dL    eGFR 38 (L) >60 mL/min/1.73m*2    Calcium 8.2 (L) 8.6 - 10.3 mg/dL    Phosphorus 4.5 2.5 - 4.9 mg/dL    Albumin 3.2 (L) 3.4 - 5.0 g/dL        Transthoracic Echo (TTE) Complete    Result Date: 1/17/2024            [facility] [address], [facility] [address]    Tel [phone] Fax [phone] TRANSTHORACIC ECHOCARDIOGRAM REPORT  Patient Name:      LUPE CADET      Reading Physician:    [id number] Rocael Montes De Oca MD Study Date:        1/17/2024             Ordering Provider:    [id number] GINO BROWN MRN/PID:           [id number]              Fellow: Accession#:        [id number]          Nurse: Date of Birth/Age: 11/20/1932 / 89 years  Sonographer:          Eugenia Najera                                                                [facility] Gender:            F                     Additional Staff: Height:            152.00 cm             Admit Date: Weight:            50.80 kg              Admission Status:     Inpatient -                                                                Routine BSA:               1.46 m2               Department Location:  [facility] Echo Lab Blood Pressure: 121 /54 mmHg Study Type:    TRANSTHORACIC ECHO (TTE) COMPLETE Diagnosis/ICD: Elevated Troponin-R79.89; Other chest pain-R07.89 Indication:    elevated troponin; chest pain  Study Detail: The following Echo studies were performed: 2D, M-Mode, Doppler and               color flow.  PHYSICIAN INTERPRETATION: Left Ventricle: Left ventricular systolic function is normal, with an estimated ejection fraction of 60-65%. Estimated left ventricular mass is increased. There are no regional wall motion abnormalities. The left ventricular cavity size is normal. There is mild concentric left ventricular hypertrophy. Spectral Doppler shows an impaired relaxation pattern of left ventricular diastolic filling. Left Atrium: The left atrium is normal in size. Right Ventricle: The right ventricle is normal in size. There is normal right ventricular global systolic function. Right Atrium: The right atrium is normal in size. Aortic Valve: The aortic valve appears abnormal. There is trivial aortic valve regurgitation. The peak instantaneous gradient of the aortic valve is 10.8 mmHg. There is aortic sclerosis with normal leaflet mobility. Mitral Valve: The mitral valve is abnormal. There is mild to moderate mitral annular calcification. There is trace mitral valve regurgitation. Tricuspid Valve: The tricuspid valve is structurally normal. There is trace tricuspid regurgitation. The right ventricular systolic pressure is unable to be estimated. Pulmonic Valve: The pulmonic valve is structurally  normal. There is no indication of pulmonic valve regurgitation. Pericardium: There is no pericardial effusion noted. Aorta: The aortic root is normal.  CONCLUSIONS:  1. Left ventricular systolic function is normal with a 60-65% estimated ejection fraction.  2. Increased LV mass.  3. Spectral Doppler shows an impaired relaxation pattern of left ventricular diastolic filling.  4. Aortic valve appears abnormal.  5. There is aortic sclerosis with normal leaflet mobility. QUANTITATIVE DATA SUMMARY: 2D MEASUREMENTS:                           Normal Ranges: LAs:           3.00 cm    (2.7-4.0cm) IVSd:          1.30 cm    (0.6-1.1cm) LVPWd:         1.40 cm    (0.6-1.1cm) LVIDd:         3.90 cm    (3.9-5.9cm) LVIDs:         2.70 cm LV Mass Index: 130.8 g/m2 LV % FS        30.8 % LA VOLUME:                             Normal Ranges: LA Area A4C:     12.0 cm2 LA Area A2C:     13.8 cm2 LA Volume Index: 19.9 ml/m2 M-MODE MEASUREMENTS:                  Normal Ranges: Ao Root: 3.10 cm (2.0-3.7cm) AoV Exc: 1.40 cm (1.5-2.5cm) AORTA MEASUREMENTS:                    Normal Ranges: AoV Exc:   1.40 cm (1.5-2.5cm) Asc Ao, d: 3.30 cm (2.1-3.4cm) LV SYSTOLIC FUNCTION BY 2D PLANIMETRY (MOD):                     Normal Ranges: EF-A4C View: 66.6 % (>=55%) EF-A2C View: 53.4 % EF-Biplane:  61.3 % LV DIASTOLIC FUNCTION:                        Normal Ranges: MV Peak E:    0.90 m/s (0.7-1.2 m/s) MV Peak A:    1.15 m/s (0.42-0.7 m/s) E/A Ratio:    0.78     (1.0-2.2) MV e'         0.07 m/s (>8.0) MV lateral e' 0.09 m/s MV medial e'  0.05 m/s E/e' Ratio:   12.86    (<8.0) MITRAL VALVE:                 Normal Ranges: MV DT: 243 msec (150-240msec) AORTIC VALVE:                          Normal Ranges: AoV Vmax:      1.64 m/s  (<=1.7m/s) AoV Peak PG:   10.8 mmHg (<20mmHg) LVOT Max Nikita:  1.38 m/s  (<=1.1m/s) LVOT VTI:      35.10 cm LVOT Diameter: 1.80 cm   (1.8-2.4cm) AoV Area,Vmax: 2.14 cm2  (2.5-4.5cm2) AORTIC INSUFFICIENCY: AI Vmax:       2.05 m/s AI  Half-time:  487 msec AI Decel Rate: 123.00 cm/s2  RIGHT VENTRICLE: RV Basal 2.80 cm RV Mid   2.20 cm RV Major 6.3 cm TAPSE:   20.7 mm RV s'    0.13 m/s TRICUSPID VALVE/RVSP:                            Normal Ranges: Peak TR Velocity: 0.96 m/s RV Syst Pressure: 6.7 mmHg (< 30mmHg) PULMONIC VALVE:                         Normal Ranges: PV Accel Time: 102 msec (>120ms)  [id number] Rocael Montes De Oca MD Electronically signed on 1/17/2024 at 10:18:22 AM  ** Final **     ECG 12 lead    Result Date: 1/16/2024  Normal sinus rhythm Incomplete right bundle branch block Left anterior fascicular block Left ventricular hypertrophy with repolarization abnormality Cannot rule out Septal infarct (cited on or before 23-JUN-2016) Abnormal ECG When compared with ECG of 16-JAN-2024 10:43, (unconfirmed) Premature atrial complexes are no longer Present    XR femur right 2+ views    Result Date: 1/16/2024  Interpreted By:  Zay Selby, STUDY: XR FEMUR RIGHT 2+ VIEWS; XR PELVIS 1-2 VIEWS; ;  1/16/2024 12:32 pm   INDICATION: Signs/Symptoms:fall.   COMPARISON: None.   ACCESSION NUMBER(S): [id number]; [id number]   ORDERING CLINICIAN: JACQUELYN GARIBAY   FINDINGS: Four views of the right femur and single frontal view the pelvis. There is a impacted and mildly proximally retracted fracture of the humeral neck. No hip dislocation. Partially imaged right knee total arthroplasty without acute hardware complication.       Acute appearing fracture of the femoral neck.     MACRO: None   Signed by: Zay Selby 1/16/2024 12:42 PM Dictation workstation:   [id number]    XR pelvis 1-2 views    Result Date: 1/16/2024  Interpreted By:  Zay Selby, STUDY: XR FEMUR RIGHT 2+ VIEWS; XR PELVIS 1-2 VIEWS; ;  1/16/2024 12:32 pm   INDICATION: Signs/Symptoms:fall.   COMPARISON: None.   ACCESSION NUMBER(S): [id number]; [id number]   ORDERING CLINICIAN: JACQUELYN GARIBAY   FINDINGS: Four views of the right femur and single frontal view the pelvis. There is a impacted and  mildly proximally retracted fracture of the humeral neck. No hip dislocation. Partially imaged right knee total arthroplasty without acute hardware complication.       Acute appearing fracture of the femoral neck.     MACRO: None   Signed by: Zay Selby 1/16/2024 12:42 PM Dictation workstation:   [id number]    CT abdomen pelvis wo IV contrast    Result Date: 1/16/2024  Interpreted By:  Cortez Horowitz, STUDY: CT ABDOMEN PELVIS WO IV CONTRAST;  1/16/2024 11:04 am   INDICATION: Signs/Symptoms:fall with abdominal pain.   COMPARISON: 03/31/2014   ACCESSION NUMBER(S): [id number]   ORDERING CLINICIAN: JACQUELYN GARIBAY   TECHNIQUE: CT of the abdomen and pelvis was performed. Contiguous axial images were obtained at 3 mm slice thickness through the abdomen and pelvis. Coronal and sagittal reconstructions at 3 mm slice thickness were performed.  No intravenous contrast was administered.   FINDINGS: Please note that the evaluation of vessels, lymph nodes and organs is limited without intravenous contrast.   LOWER CHEST: No bile duct dilatation.   ABDOMEN:   LIVER: The liver is grossly unremarkable. No definite focal masses within the limits of this unenhanced study.   BILE DUCTS: Prominence of the common bile duct may be due to prior cholecystectomy. Correlate with clinical laboratory findings.       GALLBLADDER: The gallbladder is not seen and may be surgically absent.   PANCREAS: Unremarkable pancreas.   SPLEEN: Unremarkable spleen.   ADRENAL GLANDS: The adrenal masses.   KIDNEYS AND URETERS: Atrophic right kidney. Likely parapelvic left renal cysts. No hydronephrosis.   PELVIS:   BLADDER: Grossly unremarkable.   REPRODUCTIVE ORGANS: No definite masses.   BOWEL: No evidence of bowel obstruction. There is sigmoid diverticulosis. Minimal fat stranding in the left lower quadrant. Correlate with concern for minimal acute diverticulitis. No free fluid abscess.   VESSELS: Aortic calcification. No aneurysmal dilatation.    PERITONEUM/RETROPERITONEUM/LYMPH NODES: No retroperitoneal adenopathy. No ascites. ABDOMINAL WALL: Grossly unremarkable.   BONES: Grade 1 anterolisthesis of L3 on L4. Significant compression fracture deformity of T11 of indeterminate age. There is associated retropulsion. This is new since the prior examination of 03/31/2014. Slightly displaced right femoral neck fracture of indeterminate age. This is new since the prior examination of 03/21/2023.       Significant compression fracture deformity of T11 vertebral body of indeterminate age. This may be acute. There is associated retropulsion. Consider MRI to better evaluate central canal and neural foraminal as clinically warranted.   Displaced right femoral neck fracture of indeterminate age. This may be acute.   Small to moderate hiatal hernia.   Prominence of the common bile duct could be due to prior cholecystectomy. Correlate with clinical and laboratory findings.   Asymmetrically smaller right kidney.   Sigmoid diverticulosis with question of minimal fat stranding. Can exclude minimal acute diverticulitis. No free fluid or abscess.     MACRO: None   Signed by: Cortez Horowitz 1/16/2024 11:59 AM Dictation workstation:   [id number]    ECG 12 lead    Result Date: 1/16/2024  Sinus rhythm with Premature atrial complexes Incomplete right bundle branch block Left anterior fascicular block Left ventricular hypertrophy with repolarization abnormality Cannot rule out Septal infarct (cited on or before 23-JUN-2016) Abnormal ECG When compared with ECG of 23-JUN-2016 06:41, Premature atrial complexes are now Present Questionable change in initial forces of Septal leads    CT cervical spine wo IV contrast    Result Date: 1/16/2024  Interpreted By:  Cortez Horowitz, STUDY: CT CERVICAL SPINE WO IV CONTRAST;  1/16/2024 11:04 am   INDICATION: Signs/Symptoms:fall.   COMPARISON: 03/21/2023   ACCESSION NUMBER(S): [id number]   ORDERING CLINICIAN: MELISSA LANDEROS   TECHNIQUE: Axial CT images  of the cervical spine are obtained. Axial, coronal and sagittal reconstructions are provided for review.   FINDINGS: Grade 1 anterolisthesis of C3 on C4. Grade 1 anterolisthesis of C7 on T1. Disc space loss at C5/C6. Endplate sclerosis and osteophytes are seen at multiple levels. No erosions. No acute fracture-dislocation.       No evidence for an acute fracture or subluxation of the cervical spine. Discogenic degenerative changes.   MACRO: None   Signed by: Cortez Horowitz 1/16/2024 11:51 AM Dictation workstation:   [id number]    XR chest 1 view    Result Date: 1/16/2024  Interpreted By:  Cortez Horowitz, STUDY: XR CHEST 1 VIEW;  1/16/2024 11:16 am   INDICATION: Signs/Symptoms:fall.   COMPARISON: 03/21/2023   ACCESSION NUMBER(S): [id number]   ORDERING CLINICIAN: MELISSA LANDEROS   FINDINGS:         CARDIOMEDIASTINAL SILHOUETTE: Cardiomediastinal silhouette is normal in size and configuration.   LUNGS: Lungs are clear.   ABDOMEN: No remarkable upper abdominal findings.   BONES: Degenerative changes of both shoulders..       1.  No evidence of acute cardiopulmonary process.       MACRO: None   Signed by: Cortez Horowitz 1/16/2024 11:47 AM Dictation workstation:   [id number]    CT head wo IV contrast    Result Date: 1/16/2024  Interpreted By:  Cortez Horowitz, STUDY: CT HEAD WO IV CONTRAST;  1/16/2024 11:04 am   INDICATION: Signs/Symptoms:fall.   COMPARISON: 03/21/2023   ACCESSION NUMBER(S): [id number]   ORDERING CLINICIAN: ARUBA LANDEROS   TECHNIQUE: Noncontrast axial CT scan of head was performed. Angled reformats in brain and bone windows were generated. The images were reviewed in bone, brain, blood and soft tissue windows.   FINDINGS: The ventricles, cisterns and sulci are prominent, consistent with mild diffuse volume loss. There are areas of nonspecific white matter hypodensity, which are probably age-related or microvascular in nature.   Gray-white differentiation is intact and there is no evidence of acute cortical  infarct. 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 No mass, mass effect or midline shift is seen. There is no evidence of hemorrhage.   The visualized paranasal sinuses are clear.       No evidence of acute cortical infarct or intracranial hemorrhage.   Stable chronic changes.   MACRO: None   Signed by: Cortez Horowitz 1/16/2024 11:45 AM Dictation workstation:   [id number]          Assessment/Plan   A 89 y.o. female presenting with a pmhx of of esophageal strictures, collagen vascular disease, vocal cord dysfunction, GERD, CKD 3 hypothyroidism, hypertension and hyperlipidemia who presents to [place] ED via EMS for a unwitnessed fall and dizziness. Per patient, patient was in the bathroom when she lost her balance. Patient is unable to remember if she hit her head. However, patient states she was unable to get up from the fall since yesterday at night and slept on the floor. Patient currently rates her pain 3 out of 10 right sided leg/hip pain. Patient denied any red flag symptoms such as lost of bladder/bowel or numbness or tingling. Patient currently, denied any nausea, vomiting, abd pain, HA's or chest pain. Patient reports 2 years ago she fell backyards and since then has had chronic back pain.      #S/P (DOS: 01/17/23) Right hip hemiarthroplasty for fracture  #T11 compression fracture near vertebral plana  #Elevated CK  #leukocytosis (likely reactive)    -Ortho consulted; appreciate further recs, will reach out if can start patient on DVT ppx  -Neurosurgery consulted; no cute neurosurgical intervention planned, LSO brace recommended as well as spine precautions during surgery  -Cards consulted; who deemed patient higher than average for cardiac risk  -Echo revealed: EF 60-65%  -Keep potassium >4, Phos >3 and Mg >2  -Continue with maintaince fluids LR @75 ml/hr  -Pain medications: Mild acetaminophen, moderate/severe IV Dilaudid 0.2 and .4  -PRN Zofran for  nausea  -Bowel regimen added   -Added spirometry      #Esophageal strictures  #Collagen vascular disease  #Vocal cord dysfunction  #GERD  #CKD 3  #Hypothyroidism  #Hypertension  #Hyperlipidemia  - will resume home medications      Fluids: IV Fluids  Diet: Cardiac   Consults: Neurosurgery/Ortho/Cards, PT/OT  Code: DNR/DNI  Tele-monitoring   DVT ppx: holding Lovenox; PCDs     Dispo: Admitted for acute right femoral fracture     Colin Perez DO  Internal Medicine, PGY3  Case and plan discussed with attending      Principal Problem:    Fall, initial encounter              Principal Problem:    Fall, initial encounter  Active Problems:    Closed displaced subtrochanteric fracture of right femur (CMS/HCC)      Ana Shaw DO  Internal Medicine, PGY3  Case and plan discussed with attending

## 2024-01-18 NOTE — PROGRESS NOTES
Occupational Therapy    Evaluation    Patient Name: Fadumo Corona  MRN: [id number]  Today's Date: 1/18/2024  Time Calculation  Start Time: 1104  Stop Time: 1132  Time Calculation (min): 28 min    Assessment  IP OT Assessment  OT Assessment: Patient demonstrates decreased independence wtih self care, decreased independence with functional transfers, decresaed balance, decresaed strength and decreased endurance.  Patient will benefit from skilled OT to address deficits.  Prognosis: Good  Barriers to Discharge: None  Evaluation/Treatment Tolerance: Other (Comment) (limited by dizziness)  Medical Staff Made Aware: Yes  End of Session Communication: Bedside nurse  End of Session Patient Position: Bed, 3 rail up, Alarm on    Plan:  Treatment Interventions: ADL retraining, Functional transfer training, Endurance training, Patient/family training  OT Frequency: Daily  OT Discharge Recommendations: Moderate intensity level of continued care  Equipment Recommended upon Discharge: Wheeled walker  OT Recommended Transfer Status: Assist of 2  OT - OK to Discharge: Yes (to next level of care when medically cleared by physician)    Subjective     Current Problem:  1. Fall, initial encounter        2. Non-traumatic rhabdomyolysis        3. Elevated troponin  Transthoracic Echo (TTE) Complete    Transthoracic Echo (TTE) Complete      4. Closed displaced subtrochanteric fracture of right femur, initial encounter (CMS/[facility])  Case Request Operating Room: Hip Hemiarthroplasty    Case Request Operating Room: Hip Hemiarthroplasty      5. Chest pain, unspecified type  Transthoracic Echo (TTE) Complete    Transthoracic Echo (TTE) Complete      6. Other chest pain  Transthoracic Echo (TTE) Complete      7. Closed fracture of neck of right femur, initial encounter (CMS/[facility])        8. Compression fracture of T11 vertebra, initial encounter (CMS/[facility])            General:  General  Reason for Referral: s/p right FÉLIX  Referred By:   Itzel  Past Medical History Relevant to Rehab: poor memory, T11 compression fracture  Family/Caregiver Present: No  Co-Treatment: PT  Co-Treatment Reason: patient 2 person assist  Prior to Session Communication: Bedside nurse  Patient Position Received: Bed, 3 rail up, Alarm on    Precautions:  LE Weight Bearing Status: Weight Bearing as Tolerated  Medical Precautions: Fall precautions  Post-Surgical Precautions: Left hip precautions (posterior)      Pain:  Pain Assessment  Pain Assessment: 0-10  Pain Score: 0 - No pain    Objective     Cognition:  Orientation Level: Disoriented to situation        Home Living:  Type of Home: Memory Care unit (per chart)     Prior Function:  Prior Function Comments: Patient reports being independent with self care and mobility to bathroom with FWW, eats in room      ADL:  LE Dressing Assistance: Maximal    Bed Mobility/Transfers:   Bed Mobility  Bed Mobility: Yes (supine to sit and sit to supine with max A x 2)  Transfers  Transfer: Yes (sit to stand and stand to sit with max A x 2 with FWW, patient became dizzy and returned to supine)         Extremities: RUE   RUE : Within Functional Limits and LUE   LUE: Within Functional Limits    Outcome Measures: [facility] Daily Activity  Putting on and taking off regular lower body clothing: Total  Bathing (including washing, rinsing, drying): Total  Putting on and taking off regular upper body clothing: A little  Toileting, which includes using toilet, bedpan or urinal: Total  Taking care of personal grooming such as brushing teeth: A little  Eating Meals: A little  Daily Activity - Total Score: 12        EDUCATION:  Education  Individual(s) Educated: Patient  Plan of Care Discussed and Agreed Upon: yes  Patient Response to Education: Patient/Caregiver Verbalized Understanding of Information      Goals:   Encounter Problems       Encounter Problems (Active)       OT Goals       Patient will complete functional transfers with min A.  (Progressing)       Start:  01/18/24    Expected End:  02/01/24            Patient will complete toileting with min A. (Progressing)       Start:  01/18/24    Expected End:  02/01/24            Patient will complete LE dressing with min A. (Progressing)       Start:  01/18/24    Expected End:  02/01/24

## 2024-01-18 NOTE — CARE PLAN
The patient's goals for the shift include      The clinical goals for the shift include pain control      Problem: Pain - Adult  Goal: Verbalizes/displays adequate comfort level or baseline comfort level  Outcome: Progressing     Problem: Safety - Adult  Goal: Free from fall injury  Outcome: Progressing     Problem: Discharge Planning  Goal: Discharge to home or other facility with appropriate resources  Outcome: Progressing     Problem: Chronic Conditions and Co-morbidities  Goal: Patient's chronic conditions and co-morbidity symptoms are monitored and maintained or improved  Outcome: Progressing     Problem: Skin  Goal: Decreased wound size/increased tissue granulation at next dressing change  Outcome: Progressing  Flowsheets (Taken 1/17/2024 2347)  Decreased wound size/increased tissue granulation at next dressing change: Promote sleep for wound healing  Goal: Participates in plan/prevention/treatment measures  Outcome: Progressing  Flowsheets (Taken 1/17/2024 2347)  Participates in plan/prevention/treatment measures: Elevate heels  Goal: Prevent/manage excess moisture  Outcome: Progressing  Flowsheets (Taken 1/17/2024 2347)  Prevent/manage excess moisture: Moisturize dry skin  Goal: Prevent/minimize sheer/friction injuries  Outcome: Progressing  Flowsheets (Taken 1/17/2024 2347)  Prevent/minimize sheer/friction injuries: HOB 30 degrees or less  Goal: Promote/optimize nutrition  Outcome: Progressing  Flowsheets (Taken 1/17/2024 2347)  Promote/optimize nutrition: Assist with feeding  Goal: Promote skin healing  Outcome: Progressing  Flowsheets (Taken 1/17/2024 2347)  Promote skin healing: Turn/reposition every 2 hours/use positioning/transfer devices     Problem: Pain  Goal: Takes deep breaths with improved pain control throughout the shift  Outcome: Progressing  Goal: Turns in bed with improved pain control throughout the shift  Outcome: Progressing  Goal: Walks with improved pain control throughout the  shift  Outcome: Progressing  Goal: Performs ADL's with improved pain control throughout shift  Outcome: Progressing  Goal: Participates in PT with improved pain control throughout the shift  Outcome: Progressing  Goal: Free from opioid side effects throughout the shift  Outcome: Progressing  Goal: Free from acute confusion related to pain meds throughout the shift  Outcome: Progressing     Problem: Fall/Injury  Goal: Not fall by end of shift  Outcome: Progressing  Goal: Be free from injury by end of the shift  Outcome: Progressing  Goal: Verbalize understanding of personal risk factors for fall in the hospital  Outcome: Progressing  Goal: Verbalize understanding of risk factor reduction measures to prevent injury from fall in the home  Outcome: Progressing  Goal: Use assistive devices by end of the shift  Outcome: Progressing  Goal: Pace activities to prevent fatigue by end of the shift  Outcome: Progressing     Problem: Pain  Goal: My pain/discomfort is manageable  Outcome: Progressing     Problem: Safety  Goal: Patient will be injury free during hospitalization  Outcome: Progressing  Goal: I will remain free of falls  Outcome: Progressing     Problem: Daily Care  Goal: Daily care needs are met  Outcome: Progressing     Problem: Psychosocial Needs  Goal: Demonstrates ability to cope with hospitalization/illness  Outcome: Progressing  Goal: Collaborate with me, my family, and caregiver to identify my specific goals  Outcome: Progressing     Problem: Discharge Barriers  Goal: My discharge needs are met  Outcome: Progressing

## 2024-01-18 NOTE — PROGRESS NOTES
Fadumo Corona is a 89 y.o. female on day 2 of admission presenting with Fall, initial encounter.    Subjective   Pain level 0/10  Cunningham in place  On nasal cannula   +hoarse voice  No flatus  Last BM 1/15/24  Denies any fever, chills, dizziness, chest pain, SOB, abdominal pain, n/v/d, numbness, tingling, or urinary complaints       Objective     Physical Exam  Dressing is dry and intact, Right lower extremity distally with intact dorsiflexion/plantar flexion/EHL and intact light touch sensation  No respiratory distress, nasal cannula in place  No apparent abdominal distension   Moves all extremities x4  No clear focal neurological deficit   Neck supple   Skin warm and dry   Patient awake and alert, no acute distress  Appropriate mood, cooperative    Last Recorded Vitals  Blood pressure 144/63, pulse 54, temperature 36.1 °C (97 °F), temperature source Temporal, resp. rate 16, height 1.524 m (5'), weight 50.8 kg (112 lb), SpO2 97 %.  Intake/Output last 3 Shifts:  I/O last 3 completed shifts:  In: 1468.8 (28.9 mL/kg) [I.V.:1468.8 (28.9 mL/kg)]  Out: 575 (11.3 mL/kg) [Urine:475 (0.3 mL/kg/hr); Blood:100]  Weight: 50.8 kg     Relevant Results    XR hip right with pelvis when performed 2 or 3 views    Result Date: 1/18/2024  Interpreted By:  Eleno Guy, STUDY: XR HIP RIGHT WITH PELVIS WHEN PERFORMED 2 OR 3 VIEWS;  1/18/2024 11:57 am   INDICATION: Signs/Symptoms:post-op Right say arthroplasty.   COMPARISON: None.   ACCESSION NUMBER(S): [id number]   ORDERING CLINICIAN: LUIS MANUEL CHIRINOS   FINDINGS: Right hip hemiarthroplasty in anatomic alignment. Postoperative soft tissue gas. Skin staples.       New right hip arthroplasty in anatomic alignment.     Signed by: Eleno Guy 1/18/2024 12:00 PM Dictation workstation:   [id number]    Transthoracic Echo (TTE) Complete    Result Date: 1/17/2024            [facility] [place] [address] [address] [id number]    Tel [phone] Fax [phone] TRANSTHORACIC  ECHOCARDIOGRAM REPORT  Patient Name:      LUPE CADET      Reading Physician:    [id number] Rocael Montes De Oca MD Study Date:        1/17/2024             Ordering Provider:    [id number] GINO BROWN MRN/PID:           [id number]              Fellow: Accession#:        [id number]          Nurse: Date of Birth/Age: 11/20/1932 / 89 years Sonographer:          Eugenia Najera RDCS Gender:            F                     Additional Staff: Height:            152.00 cm             Admit Date: Weight:            50.80 kg              Admission Status:     Inpatient -                                                                Routine BSA:               1.46 m2               Department Location:  [facility] Echo Lab Blood Pressure: 121 /54 mmHg Study Type:    TRANSTHORACIC ECHO (TTE) COMPLETE Diagnosis/ICD: Elevated Troponin-R79.89; Other chest pain-R07.89 Indication:    elevated troponin; chest pain  Study Detail: The following Echo studies were performed: 2D, M-Mode, Doppler and               color flow.  PHYSICIAN INTERPRETATION: Left Ventricle: Left ventricular systolic function is normal, with an estimated ejection fraction of 60-65%. Estimated left ventricular mass is increased. There are no regional wall motion abnormalities. The left ventricular cavity size is normal. There is mild concentric left ventricular hypertrophy. Spectral Doppler shows an impaired relaxation pattern of left ventricular diastolic filling. Left Atrium: The left atrium is normal in size. Right Ventricle: The right ventricle is normal in size. There is normal right ventricular global systolic function. Right Atrium: The right atrium is normal in size. Aortic Valve: The aortic valve appears abnormal. There is trivial aortic valve regurgitation. The peak  instantaneous gradient of the aortic valve is 10.8 mmHg. There is aortic sclerosis with normal leaflet mobility. Mitral Valve: The mitral valve is abnormal. There is mild to moderate mitral annular calcification. There is trace mitral valve regurgitation. Tricuspid Valve: The tricuspid valve is structurally normal. There is trace tricuspid regurgitation. The right ventricular systolic pressure is unable to be estimated. Pulmonic Valve: The pulmonic valve is structurally normal. There is no indication of pulmonic valve regurgitation. Pericardium: There is no pericardial effusion noted. Aorta: The aortic root is normal.  CONCLUSIONS:  1. Left ventricular systolic function is normal with a 60-65% estimated ejection fraction.  2. Increased LV mass.  3. Spectral Doppler shows an impaired relaxation pattern of left ventricular diastolic filling.  4. Aortic valve appears abnormal.  5. There is aortic sclerosis with normal leaflet mobility. QUANTITATIVE DATA SUMMARY: 2D MEASUREMENTS:                           Normal Ranges: LAs:           3.00 cm    (2.7-4.0cm) IVSd:          1.30 cm    (0.6-1.1cm) LVPWd:         1.40 cm    (0.6-1.1cm) LVIDd:         3.90 cm    (3.9-5.9cm) LVIDs:         2.70 cm LV Mass Index: 130.8 g/m2 LV % FS        30.8 % LA VOLUME:                             Normal Ranges: LA Area A4C:     12.0 cm2 LA Area A2C:     13.8 cm2 LA Volume Index: 19.9 ml/m2 M-MODE MEASUREMENTS:                  Normal Ranges: Ao Root: 3.10 cm (2.0-3.7cm) AoV Exc: 1.40 cm (1.5-2.5cm) AORTA MEASUREMENTS:                    Normal Ranges: AoV Exc:   1.40 cm (1.5-2.5cm) Asc Ao, d: 3.30 cm (2.1-3.4cm) LV SYSTOLIC FUNCTION BY 2D PLANIMETRY (MOD):                     Normal Ranges: EF-A4C View: 66.6 % (>=55%) EF-A2C View: 53.4 % EF-Biplane:  61.3 % LV DIASTOLIC FUNCTION:                        Normal Ranges: MV Peak E:    0.90 m/s (0.7-1.2 m/s) MV Peak A:    1.15 m/s (0.42-0.7 m/s) E/A Ratio:    0.78     (1.0-2.2) MV e'          0.07 m/s (>8.0) MV lateral e' 0.09 m/s MV medial e'  0.05 m/s E/e' Ratio:   12.86    (<8.0) MITRAL VALVE:                 Normal Ranges: MV DT: 243 msec (150-240msec) AORTIC VALVE:                          Normal Ranges: AoV Vmax:      1.64 m/s  (<=1.7m/s) AoV Peak PG:   10.8 mmHg (<20mmHg) LVOT Max Nikita:  1.38 m/s  (<=1.1m/s) LVOT VTI:      35.10 cm LVOT Diameter: 1.80 cm   (1.8-2.4cm) AoV Area,Vmax: 2.14 cm2  (2.5-4.5cm2) AORTIC INSUFFICIENCY: AI Vmax:       2.05 m/s AI Half-time:  487 msec AI Decel Rate: 123.00 cm/s2  RIGHT VENTRICLE: RV Basal 2.80 cm RV Mid   2.20 cm RV Major 6.3 cm TAPSE:   20.7 mm RV s'    0.13 m/s TRICUSPID VALVE/RVSP:                            Normal Ranges: Peak TR Velocity: 0.96 m/s RV Syst Pressure: 6.7 mmHg (< 30mmHg) PULMONIC VALVE:                         Normal Ranges: PV Accel Time: 102 msec (>120ms)  [id number] Rocael Montes De Oca MD Electronically signed on 1/17/2024 at 10:18:22 AM  ** Final **      Scheduled medications  amLODIPine, 5 mg, oral, Daily  atenolol, 25 mg, oral, Daily  hydroxychloroquine, 200 mg, oral, Daily  hydrOXYzine HCL, 25 mg, oral, Nightly  latanoprost, 1 drop, Both Eyes, Nightly  levothyroxine, 100 mcg, oral, Daily before breakfast  montelukast, 10 mg, oral, Nightly  pantoprazole, 40 mg, oral, Daily before breakfast  polyethylene glycol, 17 g, oral, Daily  sertraline, 25 mg, oral, Daily      Continuous medications  sodium chloride 0.9%, 75 mL/hr, Last Rate: 75 mL/hr (01/18/24 0842)      PRN medications  PRN medications: acetaminophen, docusate sodium, HYDROmorphone, melatonin  Results for orders placed or performed during the hospital encounter of 01/16/24 (from the past 24 hour(s))   CBC   Result Value Ref Range    WBC 8.5 4.4 - 11.3 x10*3/uL    nRBC 0.0 0.0 - 0.0 /100 WBCs    RBC 3.35 (L) 4.00 - 5.20 x10*6/uL    Hemoglobin 10.8 (L) 12.0 - 16.0 g/dL    Hematocrit 34.3 (L) 36.0 - 46.0 %     (H) 80 - 100 fL    MCH 32.2 26.0 - 34.0 pg    MCHC 31.5 (L) 32.0 - 36.0  g/dL    RDW 13.2 11.5 - 14.5 %    Platelets 183 150 - 450 x10*3/uL   Renal Function Panel   Result Value Ref Range    Glucose 79 74 - 99 mg/dL    Sodium 141 136 - 145 mmol/L    Potassium 4.1 3.5 - 5.3 mmol/L    Chloride 111 (H) 98 - 107 mmol/L    Bicarbonate 19 (L) 21 - 32 mmol/L    Anion Gap 15 10 - 20 mmol/L    Urea Nitrogen 32 (H) 6 - 23 mg/dL    Creatinine 1.32 (H) 0.50 - 1.05 mg/dL    eGFR 38 (L) >60 mL/min/1.73m*2    Calcium 8.2 (L) 8.6 - 10.3 mg/dL    Phosphorus 4.5 2.5 - 4.9 mg/dL    Albumin 3.2 (L) 3.4 - 5.0 g/dL               This patient has a urinary catheter   Reason for the urinary catheter remaining today? Urine catheter unnecessary, will be removed today     Assessment/Plan   Principal Problem:    Fall, initial encounter  Active Problems:    Closed displaced subtrochanteric fracture of right femur (CMS/HCC)    POD #1 Right Hip Hemiarthroplasty     Plan:  PT/OT   WBAT   Pain Regimen: tylenol, IV dilaudid   Hemoglobin: 10.8  DVT prophylaxis: SCDs, lovenox  Bowel: miralax, colace  GI: protonix  Ice   ICS  Disposition: SNF    Follow up with Dr Parry in 2 weeks         I spent 30 minutes in the professional and overall care of this patient.      Beltran Benson PA-C

## 2024-01-18 NOTE — PROGRESS NOTES
Physical Therapy    Physical Therapy Evaluation    Patient Name: Fadumo Corona  MRN: [id number]  Today's Date: 1/18/2024   Time Calculation  Start Time: 1108  Stop Time: 1132  Time Calculation (min): 24 min    Assessment/Plan   PT Assessment  PT Assessment Results: Decreased strength, Decreased range of motion, Decreased endurance, Impaired balance, Decreased mobility, Decreased safety awareness, Pain, Impaired judgement, Decreased cognition, Orthopedic restrictions  Rehab Prognosis: Good  Evaluation/Treatment Tolerance:  (limited by dizziness)  Medical Staff Made Aware: Yes  End of Session Communication: Bedside nurse  Assessment Comment: Pt presents today below baseline level of function and requires continued PT during hospital stay. Pt requires 24 hour physical assist for all mobility to prevent falls. Pt is unsafe to navigate home environment at this time with available support and assist. Pt requires PT at a moderate intensity level at discharge to maximize functional mobility and safety.    End of Session Patient Position: Alarm on, Bed, 3 rail up  IP OR SWING BED PT PLAN  Inpatient or Swing Bed: Inpatient  PT Plan  Treatment/Interventions: Bed mobility, Transfer training, Gait training, Balance training, Neuromuscular re-education, Strengthening, Endurance training, Range of motion, Therapeutic exercise, Therapeutic activity, Home exercise program  PT Plan: Skilled PT  PT Frequency: Daily  PT Discharge Recommendations: Moderate intensity level of continued care  Equipment Recommended upon Discharge: Wheeled walker  PT Recommended Transfer Status: Assist x2 (FWW, gait belt)  PT - OK to Discharge: Yes (To next level of care when cleared by medical team   )    Subjective         General Visit Information:  General  Reason for Referral: recent surgery  Referred By: Andi Robbins MD  Past Medical History Relevant to Rehab: Pt admitted 1/16/24 for unwittnessed fall and dizziness. Pt found to have right femur  fracture. Pt underwent right hip hemiarthroplasty 1/17. Per neurosurgery pt is to wear LSO brace when up due to T11 compression fracture near vertebral plana. PMH: collagen vascular disease, vocal cord dysfunction, CKD, HTN, HLD, hypothyroidism  Family/Caregiver Present: No  Co-Treatment: OT  Co-Treatment Reason: for safety  Prior to Session Communication: Bedside nurse  Patient Position Received: Bed, 3 rail up, Alarm on  Preferred Learning Style: verbal  General Comment: Pt agreeable to PT, nursing cleared for treatment.    Home Living:  Home Living  Type of Home: Memory Care unit  Lives With: Alone  Home Adaptive Equipment:  (rollator)  Home Layout: One level  Home Access: Level entry  Bathroom Shower/Tub: Walk-in shower  Bathroom Equipment: Grab bars in shower, Shower chair with back    Prior Level of Function:  Prior Function Per Pt/Caregiver Report  ADL Assistance: Independent  Ambulatory Assistance:  (mod I with rollator)  Prior Function Comments: denies any additional falls in the past 6 months    Precautions:  Precautions  LE Weight Bearing Status: Weight Bearing as Tolerated (right LE)  Medical Precautions: Fall precautions  Post-Surgical Precautions: Spinal precautions (posterior hip precautions)  Prosthesis/Orthosis Used:  (LSO)  Precautions Comment: LSO donned when up    Vital Signs:  Vital Signs  SpO2:  (89-94% throughout session on RA, 94% on RA in supine before leaving room)  Objective     Pain:  Pain Assessment  Pain Assessment: 0-10  Pain Score:  (denies pain at rest but reports moderate pain with movement)  Pain Type: Surgical pain  Pain Location: Hip  Pain Orientation: Right    Cognition:  Cognition  Overall Cognitive Status:  (forgetful)  Problem Solving:  (decreased)  Safety/Judgement:  (decreased)  Processing Speed: Delayed    General Assessments:      Activity Tolerance  Endurance: Decreased tolerance for upright activites (pt limited by dizziness)                 Static Sitting  Balance  Static Sitting-Comment/Number of Minutes: good  Dynamic Sitting Balance  Dynamic Sitting-Comments: good  Static Standing Balance  Static Standing-Comment/Number of Minutes: poor    Functional Assessments:     Bed Mobility  Bed Mobility: Yes  Bed Mobility 1  Bed Mobility 1: Supine to sitting  Level of Assistance 1: Maximum assistance (x 2)  Bed Mobility Comments 1: x 2  Transfers  Transfer: Yes  Transfer 1  Transfer From 1: Sit to  Transfer to 1: Stand  Transfer Device 1: Walker  Transfer Level of Assistance 1: Moderate assistance, +2  Transfers 2  Transfer From 2: Stand to  Transfer to 2: Sit  Transfer Device 2: Walker  Transfer Level of Assistance 2: Maximum assistance (x 2)  Ambulation/Gait Training  Ambulation/Gait Training Performed: No (unable to attempt due to dizziness)        Treatment    Pt educated on posterior hip precautions and use of LSO brace. Verbal and tactile cues to facilitate abduction/adduction of BLE's and UE placement to transition supine<>sitting EOB. Cues for placing feet flat on floor at EOB and squaring hips to maximize safety. Cues for safe hand placement with use of FWW for sit<>stand. Cues throughout for safe activity pacing.       Extremity/Trunk Assessments:        RLE   RLE : Exceptions to WFL  AROM RLE (degrees)  RLE AROM Comment: limited throughout  Strength RLE  RLE Overall Strength: Greater than or equal to 3/5 as evidenced by functional mobility  LLE   LLE : Exceptions to WFL  AROM LLE (degrees)  LLE AROM Comment: WFL  Strength LLE  LLE Overall Strength: Greater than or equal to 3/5 as evidenced by functional mobility    Outcome Measures:  [facility] Basic Mobility  Turning from your back to your side while in a flat bed without using bedrails: A lot  Moving from lying on your back to sitting on the side of a flat bed without using bedrails: A lot  Moving to and from bed to chair (including a wheelchair): A lot  Standing up from a chair using your arms (e.g. wheelchair or  bedside chair): A lot  To walk in hospital room: A lot  Climbing 3-5 steps with railing: Total  Basic Mobility - Total Score: 11                            Goals:  Encounter Problems       Encounter Problems (Active)       PT Problem       Pt will perform bed mobility with mod A.        Start:  01/18/24    Expected End:  02/01/24            Pt will complete sit <> stand and bed <> chair transfers with mod A.        Start:  01/18/24    Expected End:  02/01/24            Pt will ambulate 10 feet mod A using FWW with no significant gait deviations.         Start:  01/18/24    Expected End:  02/01/24            Pt will progress to completing 3 x 20 supine/seated exercises in order to increase strength and improve gait mechanics.         Start:  01/18/24    Expected End:  02/01/24                 Education Documentation  Precautions, taught by Rachel Reyes PT at 1/18/2024  1:27 PM.  Learner: Patient  Readiness: Acceptance  Method: Explanation  Response: Verbalizes Understanding, Needs Reinforcement    Body Mechanics, taught by Rachel Reyes PT at 1/18/2024  1:27 PM.  Learner: Patient  Readiness: Acceptance  Method: Explanation  Response: Verbalizes Understanding, Needs Reinforcement    Mobility Training, taught by Rachel Reyes PT at 1/18/2024  1:27 PM.  Learner: Patient  Readiness: Acceptance  Method: Explanation  Response: Verbalizes Understanding, Needs Reinforcement    Education Comments  No comments found.

## 2024-01-18 NOTE — PROGRESS NOTES
Met with daughter Anju re: SNF choices. She would like referral sent to [facility]. Requested [facility] send referral. Daughter is going to visit facility and give us her final decision.       Savana Diaz RN

## 2024-01-19 LAB
ALBUMIN SERPL BCP-MCNC: 2.8 G/DL (ref 3.4–5)
ANION GAP SERPL CALC-SCNC: 11 MMOL/L (ref 10–20)
BUN SERPL-MCNC: 27 MG/DL (ref 6–23)
CALCIUM SERPL-MCNC: 8 MG/DL (ref 8.6–10.3)
CHLORIDE SERPL-SCNC: 110 MMOL/L (ref 98–107)
CO2 SERPL-SCNC: 22 MMOL/L (ref 21–32)
CREAT SERPL-MCNC: 1.15 MG/DL (ref 0.5–1.05)
EGFRCR SERPLBLD CKD-EPI 2021: 45 ML/MIN/1.73M*2
ERYTHROCYTE [DISTWIDTH] IN BLOOD BY AUTOMATED COUNT: 13.2 % (ref 11.5–14.5)
GLUCOSE SERPL-MCNC: 98 MG/DL (ref 74–99)
HCT VFR BLD AUTO: 31.1 % (ref 36–46)
HGB BLD-MCNC: 9.7 G/DL (ref 12–16)
MAGNESIUM SERPL-MCNC: 2.07 MG/DL (ref 1.6–2.4)
MCH RBC QN AUTO: 31.4 PG (ref 26–34)
MCHC RBC AUTO-ENTMCNC: 31.2 G/DL (ref 32–36)
MCV RBC AUTO: 101 FL (ref 80–100)
NRBC BLD-RTO: 0 /100 WBCS (ref 0–0)
PHOSPHATE SERPL-MCNC: 1.8 MG/DL (ref 2.5–4.9)
PLATELET # BLD AUTO: 154 X10*3/UL (ref 150–450)
POTASSIUM SERPL-SCNC: 4 MMOL/L (ref 3.5–5.3)
RBC # BLD AUTO: 3.09 X10*6/UL (ref 4–5.2)
SODIUM SERPL-SCNC: 139 MMOL/L (ref 136–145)
WBC # BLD AUTO: 5.7 X10*3/UL (ref 4.4–11.3)

## 2024-01-19 PROCEDURE — 84100 ASSAY OF PHOSPHORUS: CPT

## 2024-01-19 PROCEDURE — 1100000001 HC PRIVATE ROOM DAILY

## 2024-01-19 PROCEDURE — 97530 THERAPEUTIC ACTIVITIES: CPT | Mod: GO,CO

## 2024-01-19 PROCEDURE — 2500000005 HC RX 250 GENERAL PHARMACY W/O HCPCS

## 2024-01-19 PROCEDURE — 97110 THERAPEUTIC EXERCISES: CPT | Mod: GP,CQ

## 2024-01-19 PROCEDURE — 51701 INSERT BLADDER CATHETER: CPT

## 2024-01-19 PROCEDURE — 2500000004 HC RX 250 GENERAL PHARMACY W/ HCPCS (ALT 636 FOR OP/ED)

## 2024-01-19 PROCEDURE — 83735 ASSAY OF MAGNESIUM: CPT

## 2024-01-19 PROCEDURE — 99232 SBSQ HOSP IP/OBS MODERATE 35: CPT

## 2024-01-19 PROCEDURE — 97530 THERAPEUTIC ACTIVITIES: CPT | Mod: GP,CQ

## 2024-01-19 PROCEDURE — 36415 COLL VENOUS BLD VENIPUNCTURE: CPT

## 2024-01-19 PROCEDURE — 85027 COMPLETE CBC AUTOMATED: CPT

## 2024-01-19 PROCEDURE — 2500000001 HC RX 250 WO HCPCS SELF ADMINISTERED DRUGS (ALT 637 FOR MEDICARE OP): Performed by: INTERNAL MEDICINE

## 2024-01-19 PROCEDURE — 99231 SBSQ HOSP IP/OBS SF/LOW 25: CPT | Performed by: PHYSICIAN ASSISTANT

## 2024-01-19 PROCEDURE — 2500000001 HC RX 250 WO HCPCS SELF ADMINISTERED DRUGS (ALT 637 FOR MEDICARE OP)

## 2024-01-19 RX ORDER — POTASSIUM PHOSPHATE, MONOBASIC AND POTASSIUM PHOSPHATE, DIBASIC 224; 236 MG/ML; MG/ML
21 INJECTION, SOLUTION INTRAVENOUS ONCE
Status: COMPLETED | OUTPATIENT
Start: 2024-01-19 | End: 2024-01-19

## 2024-01-19 RX ORDER — BISACODYL 10 MG/1
10 SUPPOSITORY RECTAL DAILY
Status: DISCONTINUED | OUTPATIENT
Start: 2024-01-19 | End: 2024-01-21 | Stop reason: HOSPADM

## 2024-01-19 RX ORDER — LACTULOSE 10 G/15ML
30 SOLUTION ORAL ONCE
Status: COMPLETED | OUTPATIENT
Start: 2024-01-19 | End: 2024-01-19

## 2024-01-19 RX ADMIN — BISACODYL 10 MG: 10 SUPPOSITORY RECTAL at 12:39

## 2024-01-19 RX ADMIN — MONTELUKAST 10 MG: 10 TABLET, FILM COATED ORAL at 21:46

## 2024-01-19 RX ADMIN — SERTRALINE HYDROCHLORIDE 25 MG: 25 TABLET ORAL at 08:41

## 2024-01-19 RX ADMIN — ENOXAPARIN SODIUM 30 MG: 30 INJECTION SUBCUTANEOUS at 13:46

## 2024-01-19 RX ADMIN — POTASSIUM PHOSPHATE, MONOBASIC POTASSIUM PHOSPHATE, DIBASIC 21 MMOL: 224; 236 INJECTION, SOLUTION, CONCENTRATE INTRAVENOUS at 11:52

## 2024-01-19 RX ADMIN — HYDROXYZINE HYDROCHLORIDE 25 MG: 25 TABLET, FILM COATED ORAL at 21:46

## 2024-01-19 RX ADMIN — POLYETHYLENE GLYCOL 3350 17 G: 17 POWDER, FOR SOLUTION ORAL at 08:41

## 2024-01-19 RX ADMIN — PANTOPRAZOLE SODIUM 40 MG: 40 TABLET, DELAYED RELEASE ORAL at 06:40

## 2024-01-19 RX ADMIN — AMLODIPINE BESYLATE 5 MG: 5 TABLET ORAL at 08:41

## 2024-01-19 RX ADMIN — HYDROXYCHLOROQUINE SULFATE 200 MG: 200 TABLET, FILM COATED ORAL at 08:41

## 2024-01-19 RX ADMIN — LEVOTHYROXINE SODIUM 100 MCG: 0.1 TABLET ORAL at 06:40

## 2024-01-19 RX ADMIN — LACTULOSE 30 G: 20 SOLUTION ORAL at 10:05

## 2024-01-19 RX ADMIN — ATENOLOL 25 MG: 25 TABLET ORAL at 08:41

## 2024-01-19 RX ADMIN — LATANOPROST 1 DROP: 50 SOLUTION OPHTHALMIC at 21:46

## 2024-01-19 ASSESSMENT — PAIN SCALES - GENERAL
PAINLEVEL_OUTOF10: 2
PAINLEVEL_OUTOF10: 4
PAINLEVEL_OUTOF10: 0 - NO PAIN

## 2024-01-19 ASSESSMENT — PAIN - FUNCTIONAL ASSESSMENT
PAIN_FUNCTIONAL_ASSESSMENT: 0-10
PAIN_FUNCTIONAL_ASSESSMENT: 0-10

## 2024-01-19 ASSESSMENT — COGNITIVE AND FUNCTIONAL STATUS - GENERAL
STANDING UP FROM CHAIR USING ARMS: A LOT
MOBILITY SCORE: 11
WALKING IN HOSPITAL ROOM: A LOT
DRESSING REGULAR UPPER BODY CLOTHING: A LOT
HELP NEEDED FOR BATHING: A LOT
MOBILITY SCORE: 11
TOILETING: A LOT
DRESSING REGULAR UPPER BODY CLOTHING: A LOT
MOVING TO AND FROM BED TO CHAIR: A LOT
CLIMB 3 TO 5 STEPS WITH RAILING: TOTAL
HELP NEEDED FOR BATHING: A LOT
DAILY ACTIVITIY SCORE: 14
STANDING UP FROM CHAIR USING ARMS: A LOT
DAILY ACTIVITIY SCORE: 14
DRESSING REGULAR LOWER BODY CLOTHING: A LOT
MOVING FROM LYING ON BACK TO SITTING ON SIDE OF FLAT BED WITH BEDRAILS: A LOT
WALKING IN HOSPITAL ROOM: A LOT
TOILETING: A LOT
TURNING FROM BACK TO SIDE WHILE IN FLAT BAD: A LOT
PERSONAL GROOMING: A LOT
TURNING FROM BACK TO SIDE WHILE IN FLAT BAD: A LOT
DRESSING REGULAR LOWER BODY CLOTHING: TOTAL
MOVING TO AND FROM BED TO CHAIR: A LOT
CLIMB 3 TO 5 STEPS WITH RAILING: TOTAL
MOVING FROM LYING ON BACK TO SITTING ON SIDE OF FLAT BED WITH BEDRAILS: A LOT
PERSONAL GROOMING: A LITTLE

## 2024-01-19 ASSESSMENT — ACTIVITIES OF DAILY LIVING (ADL): EFFECT OF PAIN ON DAILY ACTIVITIES: .

## 2024-01-19 NOTE — CARE PLAN
Problem: Safety - Adult  Goal: Free from fall injury  Outcome: Progressing     Problem: Discharge Planning  Goal: Discharge to home or other facility with appropriate resources  Outcome: Progressing     Problem: Chronic Conditions and Co-morbidities  Goal: Patient's chronic conditions and co-morbidity symptoms are monitored and maintained or improved  Outcome: Progressing     Problem: Skin  Goal: Decreased wound size/increased tissue granulation at next dressing change  Outcome: Progressing  Flowsheets (Taken 1/19/2024 0332)  Decreased wound size/increased tissue granulation at next dressing change: Promote sleep for wound healing  Goal: Participates in plan/prevention/treatment measures  Outcome: Progressing  Flowsheets (Taken 1/19/2024 0332)  Participates in plan/prevention/treatment measures: Elevate heels  Goal: Prevent/manage excess moisture  Outcome: Progressing  Flowsheets (Taken 1/19/2024 0332)  Prevent/manage excess moisture: Moisturize dry skin  Goal: Prevent/minimize sheer/friction injuries  Outcome: Progressing  Flowsheets (Taken 1/19/2024 0332)  Prevent/minimize sheer/friction injuries: Use pull sheet  Goal: Promote/optimize nutrition  Outcome: Progressing  Flowsheets (Taken 1/19/2024 0332)  Promote/optimize nutrition: Offer water/supplements/favorite foods  Goal: Promote skin healing  Outcome: Progressing  Flowsheets (Taken 1/19/2024 0332)  Promote skin healing: Protective dressings over bony prominences     Problem: Pain  Goal: Takes deep breaths with improved pain control throughout the shift  Outcome: Progressing  Goal: Turns in bed with improved pain control throughout the shift  Outcome: Progressing  Goal: Walks with improved pain control throughout the shift  Outcome: Progressing  Goal: Performs ADL's with improved pain control throughout shift  Outcome: Progressing  Goal: Participates in PT with improved pain control throughout the shift  Outcome: Progressing  Goal: Free from opioid side  effects throughout the shift  Outcome: Progressing  Goal: Free from acute confusion related to pain meds throughout the shift  Outcome: Progressing     Problem: Fall/Injury  Goal: Not fall by end of shift  Outcome: Progressing  Goal: Be free from injury by end of the shift  Outcome: Progressing  Goal: Verbalize understanding of personal risk factors for fall in the hospital  Outcome: Progressing  Goal: Verbalize understanding of risk factor reduction measures to prevent injury from fall in the home  Outcome: Progressing  Goal: Use assistive devices by end of the shift  Outcome: Progressing  Goal: Pace activities to prevent fatigue by end of the shift  Outcome: Progressing     Problem: Pain  Goal: My pain/discomfort is manageable  Outcome: Progressing     Problem: Safety  Goal: Patient will be injury free during hospitalization  Outcome: Progressing  Goal: I will remain free of falls  Outcome: Progressing     Problem: Daily Care  Goal: Daily care needs are met  Outcome: Progressing     Problem: Psychosocial Needs  Goal: Demonstrates ability to cope with hospitalization/illness  Outcome: Progressing  Goal: Collaborate with me, my family, and caregiver to identify my specific goals  Outcome: Progressing     Problem: Discharge Barriers  Goal: My discharge needs are met  Outcome: Progressing   The patient's goals for the shift include      The clinical goals for the shift include Pt will receive adequate rest.    Over the shift, the patient did receive adequate rest.

## 2024-01-19 NOTE — PROGRESS NOTES
Fadumo Corona is a 89 y.o. female on day 3 of admission presenting with Fall, initial encounter.    Subjective   Ms. Corona rouses easily from sleep to name.  She reports minimal right hip discomfort.       Objective     Dressing is dry and intact, Right lower extremity distally with intact dorsiflexion/plantar flexion/EHL and intact light touch sensation  No respiratory distress  No apparent abdominal distension   Moves all extremities x4  No clear focal neurological deficit   Neck supple   Skin warm and dry   Patient awake and alert, no acute distress  Appropriate mood, cooperative    Last Recorded Vitals  Blood pressure 131/67, pulse 54, temperature 36.2 °C (97.2 °F), temperature source Temporal, resp. rate 16, height 1.524 m (5'), weight 50.8 kg (112 lb), SpO2 94 %.  Intake/Output last 3 Shifts:  I/O last 3 completed shifts:  In: 2483.8 (48.9 mL/kg) [I.V.:2483.8 (48.9 mL/kg)]  Out: 1075 (21.2 mL/kg) [Urine:1075 (0.6 mL/kg/hr)]  Weight: 50.8 kg     Relevant Results      Scheduled medications  amLODIPine, 5 mg, oral, Daily  atenolol, 25 mg, oral, Daily  enoxaparin, 30 mg, subcutaneous, q24h  hydroxychloroquine, 200 mg, oral, Daily  hydrOXYzine HCL, 25 mg, oral, Nightly  latanoprost, 1 drop, Both Eyes, Nightly  levothyroxine, 100 mcg, oral, Daily before breakfast  montelukast, 10 mg, oral, Nightly  pantoprazole, 40 mg, oral, Daily before breakfast  polyethylene glycol, 17 g, oral, Daily  sertraline, 25 mg, oral, Daily      Continuous medications     PRN medications  PRN medications: acetaminophen, docusate sodium, melatonin, traMADol  Results for orders placed or performed during the hospital encounter of 01/16/24 (from the past 24 hour(s))   CBC   Result Value Ref Range    WBC 5.7 4.4 - 11.3 x10*3/uL    nRBC 0.0 0.0 - 0.0 /100 WBCs    RBC 3.09 (L) 4.00 - 5.20 x10*6/uL    Hemoglobin 9.7 (L) 12.0 - 16.0 g/dL    Hematocrit 31.1 (L) 36.0 - 46.0 %     (H) 80 - 100 fL    MCH 31.4 26.0 - 34.0 pg    MCHC 31.2  (L) 32.0 - 36.0 g/dL    RDW 13.2 11.5 - 14.5 %    Platelets 154 150 - 450 x10*3/uL   Renal function panel   Result Value Ref Range    Glucose 98 74 - 99 mg/dL    Sodium 139 136 - 145 mmol/L    Potassium 4.0 3.5 - 5.3 mmol/L    Chloride 110 (H) 98 - 107 mmol/L    Bicarbonate 22 21 - 32 mmol/L    Anion Gap 11 10 - 20 mmol/L    Urea Nitrogen 27 (H) 6 - 23 mg/dL    Creatinine 1.15 (H) 0.50 - 1.05 mg/dL    eGFR 45 (L) >60 mL/min/1.73m*2    Calcium 8.0 (L) 8.6 - 10.3 mg/dL    Phosphorus 1.8 (L) 2.5 - 4.9 mg/dL    Albumin 2.8 (L) 3.4 - 5.0 g/dL   Magnesium   Result Value Ref Range    Magnesium 2.07 1.60 - 2.40 mg/dL                            Assessment/Plan   Principal Problem:    Fall, initial encounter  Active Problems:    Closed displaced subtrochanteric fracture of right femur (CMS/HCC)    POD #2 Right Hip Hemiarthroplasty      Plan:  PT/OT   WBAT   Pain Regimen: tylenol, IV dilaudid   Hemoglobin: 9.7  DVT prophylaxis: SCDs, lovenox  Bowel: miralax, colace  GI: protonix  Ice   ICS  Disposition: SNF       I spent 25 minutes in the professional and overall care of this patient.      Denise Abrams PA-C

## 2024-01-19 NOTE — PROGRESS NOTES
Fadumo Corona is a 89 y.o. female on day 3 of admission presenting with Fall, initial encounter.      Subjective   Overnight/morning: retaining; @3am has only voided 150ml out at this time per her external catheter, bladder scanned for 446ml  Patient reports she is normally constipated at home and missed a few weddings b/c of it. Has not passed gas or had a bowel movement since surgery  Denies any cp, sob, nausea, vomiting, or cough    Objective   ROS are negative unless stated otherwise in the HPI    Last Recorded Vitals  /74 (BP Location: Left arm, Patient Position: Lying)   Pulse 61   Temp 36.6 °C (97.9 °F) (Temporal)   Resp 17   Wt 50.8 kg (112 lb)   SpO2 96%   Intake/Output last 3 Shifts:    Intake/Output Summary (Last 24 hours) at 1/19/2024 1223  Last data filed at 1/19/2024 0600  Gross per 24 hour   Intake --   Output 600 ml   Net -600 ml         Admission Weight  Weight: 50.8 kg (112 lb) (01/16/24 0956)    Daily Weight  01/16/24 : 50.8 kg (112 lb)    Image Results  XR hip right with pelvis when performed 2 or 3 views  Narrative: Interpreted By:  Eleno Guy,   STUDY:  XR HIP RIGHT WITH PELVIS WHEN PERFORMED 2 OR 3 VIEWS;  1/18/2024  11:57 am      INDICATION:  Signs/Symptoms:post-op Right say arthroplasty.      COMPARISON:  None.      ACCESSION NUMBER(S):  [id number]      ORDERING CLINICIAN:  LUIS MANUEL CHIRINOS      FINDINGS:  Right hip hemiarthroplasty in anatomic alignment. Postoperative soft  tissue gas. Skin staples.      Impression: New right hip arthroplasty in anatomic alignment.          Signed by: Eleno Guy 1/18/2024 12:00 PM  Dictation workstation:   [id number]      Physical Exam  Physical Exam  General: well appearing, no acute distress  HEENT:  moist mucous membranes  CV: regular rate and rhythm, no murmurs, 2+ pulses in all extremities  RESP: CTA bilaterally, normal chest expansion, no resp distress  Abd: soft, nontender, nondistended  Neuro: alert and oriented x3, speech  appropriate  Vascular: no peripheral edema appreciated  Skin: no rashes  MSK: no joint swelling; dressing in place on right leg    Relevant Results  Scheduled medications  amLODIPine, 5 mg, oral, Daily  atenolol, 25 mg, oral, Daily  bisacodyl, 10 mg, rectal, Daily  enoxaparin, 30 mg, subcutaneous, q24h  hydroxychloroquine, 200 mg, oral, Daily  hydrOXYzine HCL, 25 mg, oral, Nightly  latanoprost, 1 drop, Both Eyes, Nightly  levothyroxine, 100 mcg, oral, Daily before breakfast  montelukast, 10 mg, oral, Nightly  pantoprazole, 40 mg, oral, Daily before breakfast  polyethylene glycol, 17 g, oral, Daily  potassium phosphate, 21 mmol, intravenous, Once  sertraline, 25 mg, oral, Daily      Continuous medications       PRN medications  PRN medications: acetaminophen, docusate sodium, melatonin, traMADol   Results for orders placed or performed during the hospital encounter of 01/16/24 (from the past 24 hour(s))   CBC   Result Value Ref Range    WBC 5.7 4.4 - 11.3 x10*3/uL    nRBC 0.0 0.0 - 0.0 /100 WBCs    RBC 3.09 (L) 4.00 - 5.20 x10*6/uL    Hemoglobin 9.7 (L) 12.0 - 16.0 g/dL    Hematocrit 31.1 (L) 36.0 - 46.0 %     (H) 80 - 100 fL    MCH 31.4 26.0 - 34.0 pg    MCHC 31.2 (L) 32.0 - 36.0 g/dL    RDW 13.2 11.5 - 14.5 %    Platelets 154 150 - 450 x10*3/uL   Renal function panel   Result Value Ref Range    Glucose 98 74 - 99 mg/dL    Sodium 139 136 - 145 mmol/L    Potassium 4.0 3.5 - 5.3 mmol/L    Chloride 110 (H) 98 - 107 mmol/L    Bicarbonate 22 21 - 32 mmol/L    Anion Gap 11 10 - 20 mmol/L    Urea Nitrogen 27 (H) 6 - 23 mg/dL    Creatinine 1.15 (H) 0.50 - 1.05 mg/dL    eGFR 45 (L) >60 mL/min/1.73m*2    Calcium 8.0 (L) 8.6 - 10.3 mg/dL    Phosphorus 1.8 (L) 2.5 - 4.9 mg/dL    Albumin 2.8 (L) 3.4 - 5.0 g/dL   Magnesium   Result Value Ref Range    Magnesium 2.07 1.60 - 2.40 mg/dL        Transthoracic Echo (TTE) Complete    Result Date: 1/17/2024            [facility] [place] [address], [facility] [address]     Tel [phone] Fax [phone] TRANSTHORACIC ECHOCARDIOGRAM REPORT  Patient Name:      LUPE CADET      Reading Physician:    [id number] Rocael Montes De Oca MD Study Date:        1/17/2024             Ordering Provider:    [id number] TJRADHA DEAL                                                                KATJALOLY MRN/PID:           [id number]              Fellow: Accession#:        [id number]          Nurse: Date of Birth/Age: 11/20/1932 / 89 years Sonographer:          Eugenia Najera                                                                RDMARY ELLEN Gender:            F                     Additional Staff: Height:            152.00 cm             Admit Date: Weight:            50.80 kg              Admission Status:     Inpatient -                                                                Routine BSA:               1.46 m2               Department Location:  [facility] Echo Lab Blood Pressure: 121 /54 mmHg Study Type:    TRANSTHORACIC ECHO (TTE) COMPLETE Diagnosis/ICD: Elevated Troponin-R79.89; Other chest pain-R07.89 Indication:    elevated troponin; chest pain  Study Detail: The following Echo studies were performed: 2D, M-Mode, Doppler and               color flow.  PHYSICIAN INTERPRETATION: Left Ventricle: Left ventricular systolic function is normal, with an estimated ejection fraction of 60-65%. Estimated left ventricular mass is increased. There are no regional wall motion abnormalities. The left ventricular cavity size is normal. There is mild concentric left ventricular hypertrophy. Spectral Doppler shows an impaired relaxation pattern of left ventricular diastolic filling. Left Atrium: The left atrium is normal in size. Right Ventricle: The right ventricle is normal in size. There is normal right ventricular global systolic function. Right Atrium: The right atrium is normal in size. Aortic Valve: The aortic valve appears abnormal. There is trivial  aortic valve regurgitation. The peak instantaneous gradient of the aortic valve is 10.8 mmHg. There is aortic sclerosis with normal leaflet mobility. Mitral Valve: The mitral valve is abnormal. There is mild to moderate mitral annular calcification. There is trace mitral valve regurgitation. Tricuspid Valve: The tricuspid valve is structurally normal. There is trace tricuspid regurgitation. The right ventricular systolic pressure is unable to be estimated. Pulmonic Valve: The pulmonic valve is structurally normal. There is no indication of pulmonic valve regurgitation. Pericardium: There is no pericardial effusion noted. Aorta: The aortic root is normal.  CONCLUSIONS:  1. Left ventricular systolic function is normal with a 60-65% estimated ejection fraction.  2. Increased LV mass.  3. Spectral Doppler shows an impaired relaxation pattern of left ventricular diastolic filling.  4. Aortic valve appears abnormal.  5. There is aortic sclerosis with normal leaflet mobility. QUANTITATIVE DATA SUMMARY: 2D MEASUREMENTS:                           Normal Ranges: LAs:           3.00 cm    (2.7-4.0cm) IVSd:          1.30 cm    (0.6-1.1cm) LVPWd:         1.40 cm    (0.6-1.1cm) LVIDd:         3.90 cm    (3.9-5.9cm) LVIDs:         2.70 cm LV Mass Index: 130.8 g/m2 LV % FS        30.8 % LA VOLUME:                             Normal Ranges: LA Area A4C:     12.0 cm2 LA Area A2C:     13.8 cm2 LA Volume Index: 19.9 ml/m2 M-MODE MEASUREMENTS:                  Normal Ranges: Ao Root: 3.10 cm (2.0-3.7cm) AoV Exc: 1.40 cm (1.5-2.5cm) AORTA MEASUREMENTS:                    Normal Ranges: AoV Exc:   1.40 cm (1.5-2.5cm) Asc Ao, d: 3.30 cm (2.1-3.4cm) LV SYSTOLIC FUNCTION BY 2D PLANIMETRY (MOD):                     Normal Ranges: EF-A4C View: 66.6 % (>=55%) EF-A2C View: 53.4 % EF-Biplane:  61.3 % LV DIASTOLIC FUNCTION:                        Normal Ranges: MV Peak E:    0.90 m/s (0.7-1.2 m/s) MV Peak A:    1.15 m/s (0.42-0.7 m/s) E/A Ratio:     0.78     (1.0-2.2) MV e'         0.07 m/s (>8.0) MV lateral e' 0.09 m/s MV medial e'  0.05 m/s E/e' Ratio:   12.86    (<8.0) MITRAL VALVE:                 Normal Ranges: MV DT: 243 msec (150-240msec) AORTIC VALVE:                          Normal Ranges: AoV Vmax:      1.64 m/s  (<=1.7m/s) AoV Peak PG:   10.8 mmHg (<20mmHg) LVOT Max Nikita:  1.38 m/s  (<=1.1m/s) LVOT VTI:      35.10 cm LVOT Diameter: 1.80 cm   (1.8-2.4cm) AoV Area,Vmax: 2.14 cm2  (2.5-4.5cm2) AORTIC INSUFFICIENCY: AI Vmax:       2.05 m/s AI Half-time:  487 msec AI Decel Rate: 123.00 cm/s2  RIGHT VENTRICLE: RV Basal 2.80 cm RV Mid   2.20 cm RV Major 6.3 cm TAPSE:   20.7 mm RV s'    0.13 m/s TRICUSPID VALVE/RVSP:                            Normal Ranges: Peak TR Velocity: 0.96 m/s RV Syst Pressure: 6.7 mmHg (< 30mmHg) PULMONIC VALVE:                         Normal Ranges: PV Accel Time: 102 msec (>120ms)  [id number] Rocael Montes De Oca MD Electronically signed on 1/17/2024 at 10:18:22 AM  ** Final **     ECG 12 lead    Result Date: 1/16/2024  Normal sinus rhythm Incomplete right bundle branch block Left anterior fascicular block Left ventricular hypertrophy with repolarization abnormality Cannot rule out Septal infarct (cited on or before 23-JUN-2016) Abnormal ECG When compared with ECG of 16-JAN-2024 10:43, (unconfirmed) Premature atrial complexes are no longer Present    XR femur right 2+ views    Result Date: 1/16/2024  Interpreted By:  Zay Selby, STUDY: XR FEMUR RIGHT 2+ VIEWS; XR PELVIS 1-2 VIEWS; ;  1/16/2024 12:32 pm   INDICATION: Signs/Symptoms:fall.   COMPARISON: None.   ACCESSION NUMBER(S): [id number]; [id number]   ORDERING CLINICIAN: JACQUELYN GARIBAY   FINDINGS: Four views of the right femur and single frontal view the pelvis. There is a impacted and mildly proximally retracted fracture of the humeral neck. No hip dislocation. Partially imaged right knee total arthroplasty without acute hardware complication.       Acute appearing fracture of the  femoral neck.     MACRO: None   Signed by: Zay Selby 1/16/2024 12:42 PM Dictation workstation:   [id number]    XR pelvis 1-2 views    Result Date: 1/16/2024  Interpreted By:  Zay Selby, STUDY: XR FEMUR RIGHT 2+ VIEWS; XR PELVIS 1-2 VIEWS; ;  1/16/2024 12:32 pm   INDICATION: Signs/Symptoms:fall.   COMPARISON: None.   ACCESSION NUMBER(S): [id number]; [id number]   ORDERING CLINICIAN: JACQUELYN GARIBAY   FINDINGS: Four views of the right femur and single frontal view the pelvis. There is a impacted and mildly proximally retracted fracture of the humeral neck. No hip dislocation. Partially imaged right knee total arthroplasty without acute hardware complication.       Acute appearing fracture of the femoral neck.     MACRO: None   Signed by: Zay Selby 1/16/2024 12:42 PM Dictation workstation:   [id number]    CT abdomen pelvis wo IV contrast    Result Date: 1/16/2024  Interpreted By:  Cortez Horowitz, STUDY: CT ABDOMEN PELVIS WO IV CONTRAST;  1/16/2024 11:04 am   INDICATION: Signs/Symptoms:fall with abdominal pain.   COMPARISON: 03/31/2014   ACCESSION NUMBER(S): [id number]   ORDERING CLINICIAN: JACQUELYN GARIBAY   TECHNIQUE: CT of the abdomen and pelvis was performed. Contiguous axial images were obtained at 3 mm slice thickness through the abdomen and pelvis. Coronal and sagittal reconstructions at 3 mm slice thickness were performed.  No intravenous contrast was administered.   FINDINGS: Please note that the evaluation of vessels, lymph nodes and organs is limited without intravenous contrast.   LOWER CHEST: No bile duct dilatation.   ABDOMEN:   LIVER: The liver is grossly unremarkable. No definite focal masses within the limits of this unenhanced study.   BILE DUCTS: Prominence of the common bile duct may be due to prior cholecystectomy. Correlate with clinical laboratory findings.       GALLBLADDER: The gallbladder is not seen and may be surgically absent.   PANCREAS: Unremarkable pancreas.   SPLEEN:  Unremarkable spleen.   ADRENAL GLANDS: The adrenal masses.   KIDNEYS AND URETERS: Atrophic right kidney. Likely parapelvic left renal cysts. No hydronephrosis.   PELVIS:   BLADDER: Grossly unremarkable.   REPRODUCTIVE ORGANS: No definite masses.   BOWEL: No evidence of bowel obstruction. There is sigmoid diverticulosis. Minimal fat stranding in the left lower quadrant. Correlate with concern for minimal acute diverticulitis. No free fluid abscess.   VESSELS: Aortic calcification. No aneurysmal dilatation.   PERITONEUM/RETROPERITONEUM/LYMPH NODES: No retroperitoneal adenopathy. No ascites. ABDOMINAL WALL: Grossly unremarkable.   BONES: Grade 1 anterolisthesis of L3 on L4. Significant compression fracture deformity of T11 of indeterminate age. There is associated retropulsion. This is new since the prior examination of 03/31/2014. Slightly displaced right femoral neck fracture of indeterminate age. This is new since the prior examination of 03/21/2023.       Significant compression fracture deformity of T11 vertebral body of indeterminate age. This may be acute. There is associated retropulsion. Consider MRI to better evaluate central canal and neural foraminal as clinically warranted.   Displaced right femoral neck fracture of indeterminate age. This may be acute.   Small to moderate hiatal hernia.   Prominence of the common bile duct could be due to prior cholecystectomy. Correlate with clinical and laboratory findings.   Asymmetrically smaller right kidney.   Sigmoid diverticulosis with question of minimal fat stranding. Can exclude minimal acute diverticulitis. No free fluid or abscess.     MACRO: None   Signed by: Cortez Horowitz 1/16/2024 11:59 AM Dictation workstation:   [id number]    ECG 12 lead    Result Date: 1/16/2024  Sinus rhythm with Premature atrial complexes Incomplete right bundle branch block Left anterior fascicular block Left ventricular hypertrophy with repolarization abnormality Cannot rule out  Septal infarct (cited on or before 23-JUN-2016) Abnormal ECG When compared with ECG of 23-JUN-2016 06:41, Premature atrial complexes are now Present Questionable change in initial forces of Septal leads    CT cervical spine wo IV contrast    Result Date: 1/16/2024  Interpreted By:  Cortez Horowitz, STUDY: CT CERVICAL SPINE WO IV CONTRAST;  1/16/2024 11:04 am   INDICATION: Signs/Symptoms:fall.   COMPARISON: 03/21/2023   ACCESSION NUMBER(S): [id number]   ORDERING CLINICIAN: MELISSA LANDEROS   TECHNIQUE: Axial CT images of the cervical spine are obtained. Axial, coronal and sagittal reconstructions are provided for review.   FINDINGS: Grade 1 anterolisthesis of C3 on C4. Grade 1 anterolisthesis of C7 on T1. Disc space loss at C5/C6. Endplate sclerosis and osteophytes are seen at multiple levels. No erosions. No acute fracture-dislocation.       No evidence for an acute fracture or subluxation of the cervical spine. Discogenic degenerative changes.   MACRO: None   Signed by: Cortez Horowitz 1/16/2024 11:51 AM Dictation workstation:   [id number]    XR chest 1 view    Result Date: 1/16/2024  Interpreted By:  Cortez Horowitz, STUDY: XR CHEST 1 VIEW;  1/16/2024 11:16 am   INDICATION: Signs/Symptoms:fall.   COMPARISON: 03/21/2023   ACCESSION NUMBER(S): [id number]   ORDERING CLINICIAN: MELISSA LANDEROS   FINDINGS:         CARDIOMEDIASTINAL SILHOUETTE: Cardiomediastinal silhouette is normal in size and configuration.   LUNGS: Lungs are clear.   ABDOMEN: No remarkable upper abdominal findings.   BONES: Degenerative changes of both shoulders..       1.  No evidence of acute cardiopulmonary process.       MACRO: None   Signed by: Cortez Horowitz 1/16/2024 11:47 AM Dictation workstation:   [id number]    CT head wo IV contrast    Result Date: 1/16/2024  Interpreted By:  Cortez Horowitz, STUDY: CT HEAD WO IV CONTRAST;  1/16/2024 11:04 am   INDICATION: Signs/Symptoms:fall.   COMPARISON: 03/21/2023   ACCESSION NUMBER(S): [id number]   ORDERING  CLINICIAN: ARUBA LANDEROS   TECHNIQUE: Noncontrast axial CT scan of head was performed. Angled reformats in brain and bone windows were generated. The images were reviewed in bone, brain, blood and soft tissue windows.   FINDINGS: The ventricles, cisterns and sulci are prominent, consistent with mild diffuse volume loss. There are areas of nonspecific white matter hypodensity, which are probably age-related or microvascular in nature.   Gray-white differentiation is intact and there is no evidence of acute cortical infarct. 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 No mass, mass effect or midline shift is seen. There is no evidence of hemorrhage.   The visualized paranasal sinuses are clear.       No evidence of acute cortical infarct or intracranial hemorrhage.   Stable chronic changes.   MACRO: None   Signed by: Cortez Horowitz 1/16/2024 11:45 AM Dictation workstation:   [id number]          Assessment/Plan   A 89 y.o. female presenting with a pmhx of of esophageal strictures, collagen vascular disease, vocal cord dysfunction, GERD, CKD 3 hypothyroidism, hypertension and hyperlipidemia who presents to [place] ED via EMS for a unwitnessed fall and dizziness. Per patient, patient was in the bathroom when she lost her balance. Patient is unable to remember if she hit her head. However, patient states she was unable to get up from the fall since yesterday at night and slept on the floor. Patient currently rates her pain 3 out of 10 right sided leg/hip pain. Patient denied any red flag symptoms such as lost of bladder/bowel or numbness or tingling. Patient currently, denied any nausea, vomiting, abd pain, HA's or chest pain. Patient reports 2 years ago she fell backyards and since then has had chronic back pain.      #Constipation/Urinary Rentention, New  #S/P (DOS: 01/17/23) Right hip hemiarthroplasty for fracture  #T11 compression fracture near vertebral plana  #Elevated  CK  #leukocytosis (likely reactive)   #Esophageal strictures  #Collagen vascular disease  #Vocal cord dysfunction  #GERD  #CKD 3  #Hypothyroidism  #Hypertension  #Hyperlipidemia   -Ortho consulted; appreciate recs  -Neurosurgery was consulted; no acute neurosurgical intervention planned, LSO brace recommended as well as spine precautions during surgery  -Pain medications: Mild acetaminophen, moderate/severe IV Dilaudid 0.2 and .4  -PRN Zofran for nausea  -Continue to monitor urine outpatient, bladder scan as needed  -For constipation, patient was given mirlax/colace and lactulose; however unsuccessful. Will try enema  -CM on consult for placement; daughter needs pick a place still  -Diet: Cardiac   -Code: DNR/DNI  -Tele-monitoring   -DVT ppx: holding Lovenox; PCDs     Principal Problem:    Fall, initial encounter    Principal Problem:    Fall, initial encounter  Active Problems:    Closed displaced subtrochanteric fracture of right femur (CMS/HCC)      Ana Shaw DO  Internal Medicine, PGY3  Case and plan discussed with attending

## 2024-01-19 NOTE — PROGRESS NOTES
"Called daughter Anju to discuss [facility]. She has not visited [facility] yet and does not feel pt is ready to go. Daughter tearful and states \"We are kicking her out.\" I explained we wanted to get precert started before the weekend. She will call when she has made a choice.   Daughter called back. [facility] is [facility]. Referral sent. [facility] can clinically accept but no bed available. ADOD 1-2 days. Requested direct precert team start auth.    Savana Diaz RN      "

## 2024-01-19 NOTE — PROGRESS NOTES
Occupational Therapy    OT Treatment    Patient Name: Fadumo Corona  MRN: [id number]  Today's Date: 1/19/2024  Time Calculation  Start Time: 0927  Stop Time: 1000  Time Calculation (min): 33 min         Assessment:  OT Assessment: Okay with nurse to work with patient.  Patient was agreeable to working with therapy and put forth her best effort.  End of Session Patient Position: Alarm on, Bed, 3 rail up  OT Assessment Results: Decreased ADL status, Decreased safe judgment during ADL, Decreased cognition, Decreased endurance, Decreased functional mobility    Plan:  OT Frequency: Daily     Subjective     Current Problem:  Patient Active Problem List   Diagnosis    Acquired hammertoe of right foot    Actinic keratosis    UMAIR (generalized anxiety disorder)    BCC (basal cell carcinoma)    Burst fracture of thoracic vertebra (CMS/HCC)    Cataract    Chronic pain of toe of left foot    Chronic pain of toe of right foot    CKD (chronic kidney disease), stage III (CMS/HCC)    Collagen vascular disease (CMS/HCC)    Dysphonia, spasmodic    Edema    Gastroesophageal reflux disease without esophagitis    Fibromyalgia    Generalized weakness    Primary hypertension    Acquired hypothyroidism    IBS (irritable bowel syndrome)    Insomnia    Multiple falls    DJD (degenerative joint disease)    Osteoarthritis    Paresthesia    Rib pain on left side    Right rib fracture    Rosacea    Sciatica of left side    Seasonal allergies    Seborrheic dermatitis    Trochanteric bursitis    Urinary retention    Vitamin D deficiency    Xerosis of skin    Atopic dermatitis    Dyshidrosis    Hallux limitus of left foot    Contusion of rib on left side    Lumbar radiculopathy    Lumbar spondylosis    Lumbosacral spondylosis without myelopathy    Goldstein's neuroma    Muscle weakness    Other voice and resonance disorders    Pes planus    Presence of left artificial knee joint    Enthesopathy of hip region    Greater trochanteric pain syndrome     Myalgia, unspecified site    Pain due to onychomycosis of toenails of both feet    Difficulty in walking, not elsewhere classified    Dysphagia, oropharyngeal phase    Adult failure to thrive    Hyperlipidemia    Major depressive disorder, recurrent, unspecified (CMS/HCC)    Nausea    Severe protein-calorie malnutrition (CMS/HCC)    Thoracic compression fracture (CMS/HCC)    Unspecified asthma, uncomplicated    Failure to thrive in adult    Fall, initial encounter    Closed displaced subtrochanteric fracture of right femur (CMS/HCC)       General:  OT Received On: 01/19/24  Reason for Referral: recent surgery  Co-Treatment: PT  Co-Treatment Reason: for safety  General Comment: Patient was sitting up in bed eating breakfast but agreeable to working with therapy since she was not very hungry.  Patient was eating a muffin only.    Vital Signs:       Pain:  Pain Assessment  Pain Assessment:  (Patient c/o back pain but not hip pain.)  Objective      Activities of Daily Living: Feeding  Feeding Comments: Upon entering patient's room she was sitting up in bed drinking her coffee and picking at her breakfast.                      Functional Standing Tolerance:  Functional Standing Tolerance Comments: Three standing attempts with patient able to partially stand with great effort and maximum assist of two. Hand over hand assist to safely position patient's R hand on walker  and not the bar below the .   Patient could only partially stand with each attempt.    Bed Mobility/Transfers: Bed Mobility 1  Bed Mobility 1: Supine to sitting  Level of Assistance 1: Maximum assistance  Bed Mobility Comments 1: Patient was an assist of two.  c/o pain when moved.  Bed Mobility 2  Bed Mobility  2: Sitting to supine  Level of Assistance 2: Maximum assistance (x2)  Bed Mobility 3  Bed Mobility Comments 3: Once supine at end of session patient was dependent to move up toward head of bed.  Transfers  Transfer:  (Patient too unsafe to  transfer at this time.   Patient slightly unsteady when seated edge of bed and required maximum verbal cues to try to hold her head up, however, this possibly increased her back pain.)                Therapy/Activity:               Strength:       Other Activity:       Outcome Measures:[facility] Daily Activity  Putting on and taking off regular lower body clothing: Total  Bathing (including washing, rinsing, drying): A lot  Putting on and taking off regular upper body clothing: A lot  Toileting, which includes using toilet, bedpan or urinal: A lot  Taking care of personal grooming such as brushing teeth: A little  Eating Meals: None  Daily Activity - Total Score: 14  Education Documentation  No documentation found.  Education Comments  No comments found.        EDUCATION:  Education  Individual(s) Educated: Patient  Plan of Care Discussed and Agreed Upon: yes  Patient Response to Education: Patient/Caregiver Verbalized Understanding of Information    Goals:  Encounter Problems       Encounter Problems (Active)       OT Goals       Patient will complete functional transfers with min A. (Progressing)       Start:  01/18/24    Expected End:  01/25/24            Patient will complete toileting with min A. (Progressing)       Start:  01/18/24    Expected End:  01/25/24            Patient will complete LE dressing with min A. (Progressing)       Start:  01/18/24    Expected End:  01/25/24

## 2024-01-19 NOTE — PROGRESS NOTES
Physical Therapy    Physical Therapy    Physical Therapy Treatment    Patient Name: Fadumo Corona  MRN: [id number]  Today's Date: 1/19/2024  Time Calculation  Start Time: 0958  Stop Time: 1021  Time Calculation (min): 23 min       Assessment/Plan     PT Plan  Inpatient/Swing Bed or Outpatient: Inpatient       01/19/24 0958   PT  Visit   PT Received On 01/19/24   Response to Previous Treatment Patient with no complaints from previous session.   General   Reason for Referral recent surgery   Referred By Andi Robbins MD   Prior to Session Communication Bedside nurse   Patient Position Received Bed, 3 rail up   Precautions   Medical Precautions Fall precautions   Braces Applied TLSO while out of bed   Pain Assessment   Pain Assessment 0-10   Pain Score 4   Pain Type Surgical pain   Pain Location Back   Effect of Pain on Daily Activities .   Cognition   Overall Cognitive Status Impaired at baseline   Static Sitting Balance   Static Sitting-Balance Support Bilateral upper extremity supported   Therapeutic Exercise   Therapeutic Exercise Performed Yes   Therapeutic Exercise Activity 1 x5 each B  (AP,HIP ABDUCTION WITH ASSIST)   Bed Mobility   Bed Mobility Yes   Bed Mobility 1   Bed Mobility 1 Supine to sitting   Level of Assistance 1 Maximum assistance  (X2)   Bed Mobility 2   Bed Mobility  2 Sitting to supine   Level of Assistance 2 Maximum assistance  (X2)   Ambulation/Gait Training   Ambulation/Gait Training Performed Yes   Ambulation/Gait Training 1   Surface 1 Level tile   Device 1 Rolling walker   Gait Support Devices Gait belt   Comments/Distance (ft) 1 Unable   Transfers   Transfer Yes   Transfer 1   Technique 1 Sit to stand;Stand to sit   Transfer Device 1 Walker   Transfer Level of Assistance 1 Maximum assistance  (x2)   Trials/Comments 1 Attempted sit-stand x 3 with limited success barley  able to clear hips form themattress   PT Assessment   PT Assessment Results Decreased strength;Decreased  endurance;Impaired balance;Decreased mobility   Rehab Prognosis Fair   End of Session Patient Position Up in chair   Outpatient Education   Individual(s) Educated Patient   Education Provided Fall Risk   PT Plan   Inpatient/Swing Bed or Outpatient Inpatient     Outcome Measures:  [facility] Basic Mobility  Turning from your back to your side while in a flat bed without using bedrails: A lot  Moving from lying on your back to sitting on the side of a flat bed without using bedrails: A lot  Moving to and from bed to chair (including a wheelchair): A lot  Standing up from a chair using your arms (e.g. wheelchair or bedside chair): A lot  To walk in hospital room: A lot  Climbing 3-5 steps with railing: Total  Basic Mobility - Total Score: 11                             EDUCATION:  Outpatient Education  Individual(s) Educated: Patient  Education Provided: Fall Risk  Education Documentation  No documentation found.  Education Comments  No comments found.        GOALS:  Encounter Problems       Encounter Problems (Active)       PT Problem       Pt will perform bed mobility with mod A.  (Progressing)       Start:  01/18/24    Expected End:  01/25/24            Pt will complete sit <> stand and bed <> chair transfers with mod A.  (Progressing)       Start:  01/18/24    Expected End:  01/25/24            Pt will ambulate 10 feet mod A using FWW with no significant gait deviations.   (Progressing)       Start:  01/18/24    Expected End:  01/25/24            Pt will progress to completing 3 x 20 supine/seated exercises in order to increase strength and improve gait mechanics.   (Progressing)       Start:  01/18/24    Expected End:  01/25/24

## 2024-01-19 NOTE — NURSING NOTE
0305: Pt has only voided 150ml out at this time per her external catheter, bladder scanned for 446ml, notified Dr. Tirado, per him will bladder scan again at [id number] and straight cath if the pt has more than 500ml in her bladder. Will do and continue to monitor pt.

## 2024-01-20 LAB
ANION GAP SERPL CALC-SCNC: 11 MMOL/L (ref 10–20)
BASOPHILS # BLD AUTO: 0.04 X10*3/UL (ref 0–0.1)
BASOPHILS NFR BLD AUTO: 0.8 %
BUN SERPL-MCNC: 18 MG/DL (ref 6–23)
CALCIUM SERPL-MCNC: 8.1 MG/DL (ref 8.6–10.3)
CHLORIDE SERPL-SCNC: 109 MMOL/L (ref 98–107)
CO2 SERPL-SCNC: 24 MMOL/L (ref 21–32)
CREAT SERPL-MCNC: 1.07 MG/DL (ref 0.5–1.05)
EGFRCR SERPLBLD CKD-EPI 2021: 49 ML/MIN/1.73M*2
EOSINOPHIL # BLD AUTO: 0.4 X10*3/UL (ref 0–0.4)
EOSINOPHIL NFR BLD AUTO: 7.9 %
ERYTHROCYTE [DISTWIDTH] IN BLOOD BY AUTOMATED COUNT: 13.2 % (ref 11.5–14.5)
GLUCOSE SERPL-MCNC: 89 MG/DL (ref 74–99)
HCT VFR BLD AUTO: 30.9 % (ref 36–46)
HGB BLD-MCNC: 9.7 G/DL (ref 12–16)
IMM GRANULOCYTES # BLD AUTO: 0.03 X10*3/UL (ref 0–0.5)
IMM GRANULOCYTES NFR BLD AUTO: 0.6 % (ref 0–0.9)
LYMPHOCYTES # BLD AUTO: 0.7 X10*3/UL (ref 0.8–3)
LYMPHOCYTES NFR BLD AUTO: 13.8 %
MAGNESIUM SERPL-MCNC: 1.95 MG/DL (ref 1.6–2.4)
MCH RBC QN AUTO: 31.9 PG (ref 26–34)
MCHC RBC AUTO-ENTMCNC: 31.4 G/DL (ref 32–36)
MCV RBC AUTO: 102 FL (ref 80–100)
MONOCYTES # BLD AUTO: 0.88 X10*3/UL (ref 0.05–0.8)
MONOCYTES NFR BLD AUTO: 17.3 %
NEUTROPHILS # BLD AUTO: 3.04 X10*3/UL (ref 1.6–5.5)
NEUTROPHILS NFR BLD AUTO: 59.6 %
NRBC BLD-RTO: 0 /100 WBCS (ref 0–0)
PLATELET # BLD AUTO: 162 X10*3/UL (ref 150–450)
POTASSIUM SERPL-SCNC: 3.9 MMOL/L (ref 3.5–5.3)
RBC # BLD AUTO: 3.04 X10*6/UL (ref 4–5.2)
SODIUM SERPL-SCNC: 140 MMOL/L (ref 136–145)
WBC # BLD AUTO: 5.1 X10*3/UL (ref 4.4–11.3)

## 2024-01-20 PROCEDURE — 36415 COLL VENOUS BLD VENIPUNCTURE: CPT | Performed by: INTERNAL MEDICINE

## 2024-01-20 PROCEDURE — 85025 COMPLETE CBC W/AUTO DIFF WBC: CPT | Performed by: INTERNAL MEDICINE

## 2024-01-20 PROCEDURE — 1100000001 HC PRIVATE ROOM DAILY

## 2024-01-20 PROCEDURE — 2500000004 HC RX 250 GENERAL PHARMACY W/ HCPCS (ALT 636 FOR OP/ED)

## 2024-01-20 PROCEDURE — 2500000001 HC RX 250 WO HCPCS SELF ADMINISTERED DRUGS (ALT 637 FOR MEDICARE OP)

## 2024-01-20 PROCEDURE — 97535 SELF CARE MNGMENT TRAINING: CPT | Mod: GO,CO

## 2024-01-20 PROCEDURE — 83735 ASSAY OF MAGNESIUM: CPT | Performed by: INTERNAL MEDICINE

## 2024-01-20 PROCEDURE — 99231 SBSQ HOSP IP/OBS SF/LOW 25: CPT | Performed by: PHYSICIAN ASSISTANT

## 2024-01-20 PROCEDURE — 97530 THERAPEUTIC ACTIVITIES: CPT | Mod: GP,CQ

## 2024-01-20 PROCEDURE — 97530 THERAPEUTIC ACTIVITIES: CPT | Mod: GO,CO

## 2024-01-20 PROCEDURE — 80048 BASIC METABOLIC PNL TOTAL CA: CPT | Performed by: INTERNAL MEDICINE

## 2024-01-20 RX ORDER — ASPIRIN 81 MG/1
81 TABLET ORAL 2 TIMES DAILY
Qty: 60 TABLET | Refills: 0 | Status: SHIPPED | OUTPATIENT
Start: 2024-01-20 | End: 2024-02-19

## 2024-01-20 RX ORDER — TRAMADOL HYDROCHLORIDE 50 MG/1
50 TABLET ORAL EVERY 8 HOURS PRN
Qty: 10 TABLET | Refills: 0 | Status: SHIPPED | OUTPATIENT
Start: 2024-01-20

## 2024-01-20 RX ADMIN — LATANOPROST 1 DROP: 50 SOLUTION OPHTHALMIC at 21:53

## 2024-01-20 RX ADMIN — HYDROXYZINE HYDROCHLORIDE 25 MG: 25 TABLET, FILM COATED ORAL at 21:53

## 2024-01-20 RX ADMIN — AMLODIPINE BESYLATE 5 MG: 5 TABLET ORAL at 08:54

## 2024-01-20 RX ADMIN — ATENOLOL 25 MG: 25 TABLET ORAL at 08:54

## 2024-01-20 RX ADMIN — ENOXAPARIN SODIUM 30 MG: 30 INJECTION SUBCUTANEOUS at 14:37

## 2024-01-20 RX ADMIN — LEVOTHYROXINE SODIUM 100 MCG: 0.1 TABLET ORAL at 06:10

## 2024-01-20 RX ADMIN — HYDROXYCHLOROQUINE SULFATE 200 MG: 200 TABLET, FILM COATED ORAL at 08:54

## 2024-01-20 RX ADMIN — MONTELUKAST 10 MG: 10 TABLET, FILM COATED ORAL at 21:53

## 2024-01-20 RX ADMIN — POLYETHYLENE GLYCOL 3350 17 G: 17 POWDER, FOR SOLUTION ORAL at 08:54

## 2024-01-20 RX ADMIN — PANTOPRAZOLE SODIUM 40 MG: 40 TABLET, DELAYED RELEASE ORAL at 06:10

## 2024-01-20 RX ADMIN — SERTRALINE HYDROCHLORIDE 25 MG: 25 TABLET ORAL at 08:54

## 2024-01-20 ASSESSMENT — COGNITIVE AND FUNCTIONAL STATUS - GENERAL
HELP NEEDED FOR BATHING: A LOT
WALKING IN HOSPITAL ROOM: A LOT
STANDING UP FROM CHAIR USING ARMS: A LOT
TURNING FROM BACK TO SIDE WHILE IN FLAT BAD: A LOT
EATING MEALS: A LOT
TOILETING: A LITTLE
PERSONAL GROOMING: A LOT
DRESSING REGULAR LOWER BODY CLOTHING: A LOT
TOILETING: TOTAL
DRESSING REGULAR UPPER BODY CLOTHING: A LITTLE
MOVING TO AND FROM BED TO CHAIR: A LOT
MOBILITY SCORE: 13
CLIMB 3 TO 5 STEPS WITH RAILING: A LOT
HELP NEEDED FOR BATHING: A LITTLE
DAILY ACTIVITIY SCORE: 11
DAILY ACTIVITIY SCORE: 19
DRESSING REGULAR UPPER BODY CLOTHING: A LOT
PERSONAL GROOMING: A LITTLE
MOVING FROM LYING ON BACK TO SITTING ON SIDE OF FLAT BED WITH BEDRAILS: A LITTLE
DRESSING REGULAR LOWER BODY CLOTHING: A LITTLE

## 2024-01-20 ASSESSMENT — PAIN SCALES - GENERAL
PAINLEVEL_OUTOF10: 0 - NO PAIN
PAINLEVEL_OUTOF10: 0 - NO PAIN

## 2024-01-20 ASSESSMENT — PAIN - FUNCTIONAL ASSESSMENT: PAIN_FUNCTIONAL_ASSESSMENT: 0-10

## 2024-01-20 ASSESSMENT — ACTIVITIES OF DAILY LIVING (ADL): HOME_MANAGEMENT_TIME_ENTRY: 23

## 2024-01-20 NOTE — PROGRESS NOTES
Received j[facility]y. [facility] will have a bed tomorrow. Left a voice mail message for the pt's dtr. Will send [facility] once completed.       Tony Hidalgo

## 2024-01-20 NOTE — CARE PLAN
Problem: Safety - Adult  Goal: Free from fall injury  Outcome: Progressing     Problem: Discharge Planning  Goal: Discharge to home or other facility with appropriate resources  Outcome: Progressing     Problem: Chronic Conditions and Co-morbidities  Goal: Patient's chronic conditions and co-morbidity symptoms are monitored and maintained or improved  Outcome: Progressing     Problem: Skin  Goal: Decreased wound size/increased tissue granulation at next dressing change  Outcome: Progressing  Goal: Participates in plan/prevention/treatment measures  Outcome: Progressing  Goal: Prevent/manage excess moisture  Outcome: Progressing  Goal: Prevent/minimize sheer/friction injuries  Outcome: Progressing  Goal: Promote/optimize nutrition  Outcome: Progressing  Goal: Promote skin healing  Outcome: Progressing     Problem: Pain  Goal: Takes deep breaths with improved pain control throughout the shift  Outcome: Progressing  Goal: Turns in bed with improved pain control throughout the shift  Outcome: Progressing  Goal: Walks with improved pain control throughout the shift  Outcome: Progressing  Goal: Performs ADL's with improved pain control throughout shift  Outcome: Progressing  Goal: Participates in PT with improved pain control throughout the shift  Outcome: Progressing  Goal: Free from opioid side effects throughout the shift  Outcome: Progressing  Goal: Free from acute confusion related to pain meds throughout the shift  Outcome: Progressing     Problem: Fall/Injury  Goal: Not fall by end of shift  Outcome: Progressing  Goal: Be free from injury by end of the shift  Outcome: Progressing  Goal: Verbalize understanding of personal risk factors for fall in the hospital  Outcome: Progressing  Goal: Verbalize understanding of risk factor reduction measures to prevent injury from fall in the home  Outcome: Progressing  Goal: Use assistive devices by end of the shift  Outcome: Progressing  Goal: Pace activities to prevent  fatigue by end of the shift  Outcome: Progressing     Problem: Pain  Goal: My pain/discomfort is manageable  Outcome: Progressing     Problem: Safety  Goal: Patient will be injury free during hospitalization  Outcome: Progressing  Goal: I will remain free of falls  Outcome: Progressing     Problem: Daily Care  Goal: Daily care needs are met  Outcome: Progressing     Problem: Psychosocial Needs  Goal: Demonstrates ability to cope with hospitalization/illness  Outcome: Progressing  Goal: Collaborate with me, my family, and caregiver to identify my specific goals  Outcome: Progressing     Problem: Discharge Barriers  Goal: My discharge needs are met  Outcome: Progressing   The patient's goals for the shift include      The clinical goals for the shift include Pt will have minimal pain and receive adequate rest.    Over the shift, the patient did have minimal pain and received adequate rest.

## 2024-01-20 NOTE — PROGRESS NOTES
Fadumo Corona is a 89 y.o. female on day 4 of admission presenting with Fall, initial encounter.    Subjective   Ms. Corona describes mild right hip pain. Is progressing very slowly in therapy.       Objective     Dressing is dry and intact, Right lower extremity distally with intact dorsiflexion/plantar flexion/EHL and intact light touch sensation  No respiratory distress  No apparent abdominal distension   Moves all extremities x4  No clear focal neurological deficit   Neck supple   Skin warm and dry   Patient awake and alert, no acute distress  Appropriate mood, cooperative       Last Recorded Vitals  Blood pressure 134/74, pulse 58, temperature 36.4 °C (97.5 °F), temperature source Temporal, resp. rate 18, height 1.524 m (5'), weight 50.8 kg (112 lb), SpO2 96 %.  Intake/Output last 3 Shifts:  I/O last 3 completed shifts:  In: - (0 mL/kg)   Out: 1150 (22.6 mL/kg) [Urine:1150 (0.6 mL/kg/hr)]  Weight: 50.8 kg     Relevant Results      Scheduled medications  amLODIPine, 5 mg, oral, Daily  atenolol, 25 mg, oral, Daily  bisacodyl, 10 mg, rectal, Daily  enoxaparin, 30 mg, subcutaneous, q24h  hydroxychloroquine, 200 mg, oral, Daily  hydrOXYzine HCL, 25 mg, oral, Nightly  latanoprost, 1 drop, Both Eyes, Nightly  levothyroxine, 100 mcg, oral, Daily before breakfast  montelukast, 10 mg, oral, Nightly  pantoprazole, 40 mg, oral, Daily before breakfast  polyethylene glycol, 17 g, oral, Daily  sertraline, 25 mg, oral, Daily      Continuous medications     PRN medications  PRN medications: acetaminophen, docusate sodium, melatonin, traMADol  Results for orders placed or performed during the hospital encounter of 01/16/24 (from the past 24 hour(s))   Basic Metabolic Panel   Result Value Ref Range    Glucose 89 74 - 99 mg/dL    Sodium 140 136 - 145 mmol/L    Potassium 3.9 3.5 - 5.3 mmol/L    Chloride 109 (H) 98 - 107 mmol/L    Bicarbonate 24 21 - 32 mmol/L    Anion Gap 11 10 - 20 mmol/L    Urea Nitrogen 18 6 - 23 mg/dL     Creatinine 1.07 (H) 0.50 - 1.05 mg/dL    eGFR 49 (L) >60 mL/min/1.73m*2    Calcium 8.1 (L) 8.6 - 10.3 mg/dL   CBC and Auto Differential   Result Value Ref Range    WBC 5.1 4.4 - 11.3 x10*3/uL    nRBC 0.0 0.0 - 0.0 /100 WBCs    RBC 3.04 (L) 4.00 - 5.20 x10*6/uL    Hemoglobin 9.7 (L) 12.0 - 16.0 g/dL    Hematocrit 30.9 (L) 36.0 - 46.0 %     (H) 80 - 100 fL    MCH 31.9 26.0 - 34.0 pg    MCHC 31.4 (L) 32.0 - 36.0 g/dL    RDW 13.2 11.5 - 14.5 %    Platelets 162 150 - 450 x10*3/uL    Neutrophils % 59.6 40.0 - 80.0 %    Immature Granulocytes %, Automated 0.6 0.0 - 0.9 %    Lymphocytes % 13.8 13.0 - 44.0 %    Monocytes % 17.3 2.0 - 10.0 %    Eosinophils % 7.9 0.0 - 6.0 %    Basophils % 0.8 0.0 - 2.0 %    Neutrophils Absolute 3.04 1.60 - 5.50 x10*3/uL    Immature Granulocytes Absolute, Automated 0.03 0.00 - 0.50 x10*3/uL    Lymphocytes Absolute 0.70 (L) 0.80 - 3.00 x10*3/uL    Monocytes Absolute 0.88 (H) 0.05 - 0.80 x10*3/uL    Eosinophils Absolute 0.40 0.00 - 0.40 x10*3/uL    Basophils Absolute 0.04 0.00 - 0.10 x10*3/uL   Magnesium   Result Value Ref Range    Magnesium 1.95 1.60 - 2.40 mg/dL               XR hip right with pelvis when performed 2 or 3 views    Result Date: 1/18/2024  Interpreted By:  Eleno Guy, STUDY: XR HIP RIGHT WITH PELVIS WHEN PERFORMED 2 OR 3 VIEWS;  1/18/2024 11:57 am   INDICATION: Signs/Symptoms:post-op Right say arthroplasty.   COMPARISON: None.   ACCESSION NUMBER(S): [id number]   ORDERING CLINICIAN: LUIS MANUEL CHIRINOS   FINDINGS: Right hip hemiarthroplasty in anatomic alignment. Postoperative soft tissue gas. Skin staples.       New right hip arthroplasty in anatomic alignment.     Signed by: Eleno Guy 1/18/2024 12:00 PM Dictation workstation:   [id number]                Assessment/Plan   Principal Problem:    Fall, initial encounter  Active Problems:    Closed displaced subtrochanteric fracture of right femur (CMS/HCC)  Postop day #3 status post right hip hemiarthroplasty      PT/OT    WBAT   Pain Regimen: tylenol, IV dilaudid   Hemoglobin: 9.7  DVT prophylaxis: SCDs, lovenox  Bowel: miralax, colace  GI: protonix  Ice   ICS  Disposition: pt is ready for discharge to SNF from ortho standpoint       I spent 25 minutes in the professional and overall care of this patient.      Denise Abrams PA-C

## 2024-01-20 NOTE — PROGRESS NOTES
Physical Therapy    Physical Therapy Treatment    Patient Name: Fadumo Corona  MRN: [id number]  Today's Date: 1/20/2024  Time Calculation  Start Time: 1145  Stop Time: 1215  Time Calculation (min): 30 min        01/20/24 1145   PT  Visit   PT Received On 01/20/24   Response to Previous Treatment Patient with no complaints from previous session.   General   Reason for Referral recent surgery   Prior to Session Communication Bedside nurse   Patient Position Received Bed, 3 rail up;Alarm on   General Comment Pt. in bed when i arrived. She was agreeable to PT.  Pain R hip. LSO brace on when OOB -T-11 compression fracture. .   Precautions   LE Weight Bearing Status Weight Bearing as Tolerated   Medical Precautions Fall precautions   Post-Surgical Precautions R hip Hemiarthroplasty - precautions   Braces Applied Spine precautions. Brace donned when OOB .   Pain Assessment   Pain Location   (Right hip.) P LE Hip slight internal rotation when at rest.      Cognition   Overall Cognitive Status Impaired   Orientation Level Disoriented to situation   Bed Mobility 1   Bed Mobility 1 Supine to sitting   Level of Assistance 1 Maximum assistance  (x2, HOB elevated. Sat on EOB wiht assist to yael brace.) Sat EOB. Cues and assist for upright posture and neutral positioning of R LE.    Bed Mobility Comments 1 Used the drawsheet for supine to sit.   Transfer 1   Technique 1 Sit to stand;Stand to sit   Transfer Device 1 Walker   Transfer Level of Assistance 1 Maximum assistance  (2. Constant cues for transfer tech, Posterior lean instanding. Anxious, fearful of falling. Stood 3 times.)   Trials/Comments 1 Bed  to chair  transfer with max/dependent assist x2. Pivot transfer to her left. Repositioned in the chair with max x2.   Activity Tolerance   Endurance Tolerates 10 - 20 min exercise with multiple rests   PT Assessment   End of Session Communication Bedside nurse   End of Session Patient Position Up in chair;Alarm on      Assessment/Plan   PT Assessment  PT Assessment Results: Decreased strength, Decreased endurance, Impaired balance, Decreased mobility  Rehab Prognosis: Fair  Evaluation/Treatment Tolerance:  (limited by dizziness)  Medical Staff Made Aware: Yes  End of Session Communication: Bedside nurse  Assessment Comment:  End of Session Patient Position: Up in chair, Alarm on  PT Plan  Inpatient/Swing Bed or Outpatient: Inpatient  PT Plan  Treatment/Interventions: Bed mobility, Transfer training, Gait training, Balance training, Neuromuscular re-education, Strengthening, Endurance training, Range of motion, Therapeutic exercise, Therapeutic activity, Home exercise program  PT Plan: Skilled PT  PT Frequency: Daily  PT Discharge Recommendations: Moderate intensity level of continued care  Equipment Recommended upon Discharge: Wheeled walker  PT Recommended Transfer Status: Assist x2 (FWW, gait belt)  PT - OK to Discharge: Yes    Outcome Measures:                            EDUCATION:  Outpatient Education  Individual(s) Educated: Patient  Education Provided: Fall Risk  Education Documentation  Precautions, taught by Willie Duenas PTA at 1/20/2024  5:25 PM.  Learner: Patient  Readiness: Acceptance  Method: Demonstration  Response: Needs Reinforcement    Body Mechanics, taught by Willie Duenas PTA at 1/20/2024  5:25 PM.  Learner: Patient  Readiness: Acceptance  Method: Demonstration  Response: Needs Reinforcement    Mobility Training, taught by Willie Duenas PTA at 1/20/2024  5:25 PM.  Learner: Patient  Readiness: Acceptance  Method: Demonstration  Response: Needs Reinforcement    Education Comments  No comments found.        GOALS:  Encounter Problems       Encounter Problems (Active)       PT Problem       Pt will perform bed mobility with mod A.  (Progressing)       Start:  01/18/24    Expected End:  01/25/24            Pt will complete sit <> stand and bed <> chair transfers with mod A.  (Progressing)       Start:   01/18/24    Expected End:  01/25/24            Pt will ambulate 10 feet mod A using FWW with no significant gait deviations.   (Progressing)       Start:  01/18/24    Expected End:  01/25/24            Pt will progress to completing 3 x 20 supine/seated exercises in order to increase strength and improve gait mechanics.   (Progressing)       Start:  01/18/24    Expected End:  01/25/24

## 2024-01-20 NOTE — DISCHARGE SUMMARY
Discharge Diagnosis  Fall, initial encounter    Issues Requiring Follow-Up  Follow-up with PCP  Follow-up with orthopedic surgery in 3 weeks regarding the surgery    Discharge Meds     Your medication list        START taking these medications        Instructions Last Dose Given Next Dose Due   aspirin 81 mg EC tablet      Take 1 tablet (81 mg) by mouth 2 times a day.       traMADol 50 mg tablet  Commonly known as: Ultram      Take 1 tablet (50 mg) by mouth every 8 hours if needed (moderate to severe pain).              CONTINUE taking these medications        Instructions Last Dose Given Next Dose Due   acetaminophen 500 mg tablet  Commonly known as: Tylenol           amLODIPine 5 mg tablet  Commonly known as: Norvasc      Take 1 tablet (5 mg) by mouth once daily. As directed       atenolol 25 mg tablet  Commonly known as: Tenormin      Take 1 tablet (25 mg) by mouth once daily.       docusate sodium 100 mg capsule  Commonly known as: Colace           hydroxychloroquine 200 mg tablet  Commonly known as: Plaquenil      Take 1 tablet (200 mg) by mouth once daily.       hydrOXYzine HCL 25 mg tablet  Commonly known as: Atarax      Take 1 tablet (25 mg) by mouth once daily at bedtime.       latanoprost 0.005 % ophthalmic solution  Commonly known as: Xalatan           levothyroxine 100 mcg tablet  Commonly known as: Synthroid, Levoxyl      Take 1 tablet (100 mcg) by mouth once daily.       montelukast 10 mg tablet  Commonly known as: Singulair           omeprazole 40 mg DR capsule  Commonly known as: PriLOSEC      Take 1 capsule (40 mg) by mouth once daily.       sertraline 50 mg tablet  Commonly known as: Zoloft      Take 0.5 tablets (25 mg) by mouth once daily.                 Where to Get Your Medications        These medications were sent to [id number] - [place], [place] - [address] AT [facility] & [address] [place], [place] [address]      Phone: [phone]    aspirin 81 mg EC tablet  traMADol 50 mg tablet         Test Results Pending At Discharge  Pending Labs       No current pending labs.            Hospital Course  89-year-old female with past medical history of esophageal stricture, vocal cord dysfunction, GERD, CKD stage III, hypothyroidism, hypertension, hyperlipidemia presented after unwitnessed fall and dizziness.  Patient was found to have T11 fracture along with right hip fracture.  Patient was taken hemiarthroplasty by Dr. Jeremy Parry.  Patient tolerated the procedure well.  Orthopedic surgery evaluated patient and recommended discharge to skilled nursing facility based on obesity recommendation.  Patient was constipated and was having urinary retention because of the.  Once constipation resolved she is voiding appropriately.  Will place patient on DVT prophylaxis with aspirin 81 mg twice daily for 30 days for DVT prophylaxis.    39 minutes spent in discharge timing    Pertinent Physical Exam At Time of Discharge  General: Not in acute distress, alert  HEENT: PERRLA, head intact and normocephalic  Neck: Normal to inspection  Lungs: Clear to auscultation, work of breathing within normal limit  Cardiac: Regular rate and rhythm  Abdomen: Soft nontender, positive bowel sounds  : Exam deferred  Skin: Intact  Hematology: No petechia or excessive ecchymosis  Musculoskeletal: Hip dressing looking well  Neurological: Alert awake oriented, no focal deficit, cranial nerves grossly intact  Psych: No suicidal ideation or homicidal ideation    Outpatient Follow-Up  Future Appointments   Date Time Provider Department Center   3/7/2024  2:20 PM [facility]

## 2024-01-20 NOTE — PROGRESS NOTES
Occupational Therapy    OT Treatment    Patient Name: Fadumo Corona  MRN: [id number]  Today's Date: 1/20/2024  Time Calculation  Start Time: 1140  Stop Time: 1235  Time Calculation (min): 55 min       Assessment:  End of Session Communication: Bedside nurse  End of Session Patient Position: Up in chair, Alarm on     Plan:  Treatment Interventions: ADL retraining, Functional transfer training, Endurance training, Patient/family training  OT Frequency: Daily  Treatment Interventions: ADL retraining, Functional transfer training, Endurance training, Patient/family training    Subjective   Previous Visit Info:  OT Last Visit  OT Received On: 01/20/24  General:  General  Prior to Session Communication: Bedside nurse  Patient Position Received: Bed, 3 rail up, Alarm on  Precautions:  LE Weight Bearing Status: Weight Bearing as Tolerated  Medical Precautions: Fall precautions  Post-Surgical Precautions: Spinal precautions  Braces Applied: TLSO while out of bed     Pain:  Pain Assessment  Pain Score:  (not rated)  Pain Type: Surgical pain  Pain Location: Hip  Pain Orientation: Right    Objective    Cognition:  Cognition  Overall Cognitive Status: Impaired  Orientation Level: Disoriented to situation  Following Commands: Follows multistep commands with repetition (~25%)  Insight: Moderate  Impulsive: Moderately     Activities of Daily Living: LE Dressing  LE Dressing: Yes  Adult Briefs Level of Assistance:  (donned underwear with AE and hand over hand with max a.  pants mgmt while in stance dep poor- balance)  LE Dressing Where Assessed: Edge of bed    Toileting  Toileting Level of Assistance: Dependent  Where Assessed:  (in stance)  Functional Standing Tolerance:  Functional Standing Tolerance Comments: patient stood for a total of 2 mins this date with 2 ue support at all times with poor- balance  Bed Mobility/Transfers: Bed Mobility 1  Bed Mobility 1: Supine to sitting  Level of Assistance 1: Maximum assistance  (x2)    Transfers  Transfer: Yes  Transfer 1  Technique 1: Sit to stand  Transfer Device 1: Walker  Transfer Level of Assistance 1: Maximum assistance, +2, Maximum verbal cues, Maximum tactile cues  Transfers 2  Transfer From 2: Bed to  Transfer to 2: Chair with arms  Technique 2: Stand pivot  Transfer Level of Assistance 2: Maximum assistance, +2, Maximum tactile cues, Maximum verbal cues    Sitting Balance:  Dynamic Sitting Balance  Dynamic Sitting-Balance:  (fair/fair- balance)  Standing Balance:  Dynamic Standing Balance  Dynamic Standing-Balance:  (poor- balance)    Outcome Measures:[facility] Daily Activity  Putting on and taking off regular lower body clothing: A lot  Bathing (including washing, rinsing, drying): A lot  Putting on and taking off regular upper body clothing: A lot  Toileting, which includes using toilet, bedpan or urinal: Total  Taking care of personal grooming such as brushing teeth: A lot  Eating Meals: A lot  Daily Activity - Total Score: 11    Education Documentation  No documentation found.  Education Comments  No comments found.      OP EDUCATION:  Education  Individual(s) Educated: Patient  Education Provided: Diagnosis & Precautions, Symptom management, Ergonomics and postural realignment, Joint protection and energy conservation, Fall precautons, Risk and benefits of OT discussed with patient or other, POC discussed and agreed upon  Diagnosis and Precautions: hip precautions and spinal precautions  Equipment:  (AE, EC tech, safety)  Patient/Caregiver Demonstrated Understanding: yes  Plan of Care Discussed and Agreed Upon: yes  Patient Response to Education: Patient/Caregiver Verbalized Understanding of Information    Goals:  Encounter Problems       Encounter Problems (Active)       OT Goals       Patient will complete functional transfers with min A. (Progressing)       Start:  01/18/24    Expected End:  01/25/24            Patient will complete toileting with min A. (Progressing)        Start:  01/18/24    Expected End:  01/25/24            Patient will complete LE dressing with min A. (Progressing)       Start:  01/18/24    Expected End:  01/25/24

## 2024-01-21 VITALS
HEIGHT: 60 IN | WEIGHT: 112 LBS | RESPIRATION RATE: 20 BRPM | BODY MASS INDEX: 21.99 KG/M2 | SYSTOLIC BLOOD PRESSURE: 151 MMHG | HEART RATE: 60 BPM | OXYGEN SATURATION: 100 % | DIASTOLIC BLOOD PRESSURE: 70 MMHG | TEMPERATURE: 97.5 F

## 2024-01-21 PROCEDURE — 2500000001 HC RX 250 WO HCPCS SELF ADMINISTERED DRUGS (ALT 637 FOR MEDICARE OP)

## 2024-01-21 PROCEDURE — 2500000004 HC RX 250 GENERAL PHARMACY W/ HCPCS (ALT 636 FOR OP/ED)

## 2024-01-21 PROCEDURE — 99239 HOSP IP/OBS DSCHRG MGMT >30: CPT | Performed by: INTERNAL MEDICINE

## 2024-01-21 PROCEDURE — 99231 SBSQ HOSP IP/OBS SF/LOW 25: CPT | Performed by: INTERNAL MEDICINE

## 2024-01-21 RX ADMIN — SERTRALINE HYDROCHLORIDE 25 MG: 25 TABLET ORAL at 09:00

## 2024-01-21 RX ADMIN — POLYETHYLENE GLYCOL 3350 17 G: 17 POWDER, FOR SOLUTION ORAL at 08:32

## 2024-01-21 RX ADMIN — HYDROXYCHLOROQUINE SULFATE 200 MG: 200 TABLET, FILM COATED ORAL at 08:32

## 2024-01-21 RX ADMIN — PANTOPRAZOLE SODIUM 40 MG: 40 TABLET, DELAYED RELEASE ORAL at 06:35

## 2024-01-21 RX ADMIN — ATENOLOL 25 MG: 25 TABLET ORAL at 08:32

## 2024-01-21 RX ADMIN — AMLODIPINE BESYLATE 5 MG: 5 TABLET ORAL at 08:32

## 2024-01-21 RX ADMIN — LEVOTHYROXINE SODIUM 100 MCG: 0.1 TABLET ORAL at 06:35

## 2024-01-21 ASSESSMENT — PAIN SCALES - GENERAL: PAINLEVEL_OUTOF10: 2

## 2024-01-21 NOTE — CARE PLAN
Problem: Safety - Adult  Goal: Free from fall injury  Outcome: Progressing     Problem: Discharge Planning  Goal: Discharge to home or other facility with appropriate resources  Outcome: Progressing     Problem: Chronic Conditions and Co-morbidities  Goal: Patient's chronic conditions and co-morbidity symptoms are monitored and maintained or improved  Outcome: Progressing     Problem: Skin  Goal: Decreased wound size/increased tissue granulation at next dressing change  Outcome: Progressing  Goal: Participates in plan/prevention/treatment measures  Outcome: Progressing  Goal: Prevent/manage excess moisture  Outcome: Progressing  Goal: Prevent/minimize sheer/friction injuries  Outcome: Progressing  Goal: Promote/optimize nutrition  Outcome: Progressing  Goal: Promote skin healing  Outcome: Progressing     Problem: Pain  Goal: Takes deep breaths with improved pain control throughout the shift  Outcome: Progressing  Goal: Turns in bed with improved pain control throughout the shift  Outcome: Progressing  Goal: Walks with improved pain control throughout the shift  Outcome: Progressing  Goal: Performs ADL's with improved pain control throughout shift  Outcome: Progressing  Goal: Participates in PT with improved pain control throughout the shift  Outcome: Progressing  Goal: Free from opioid side effects throughout the shift  Outcome: Progressing  Goal: Free from acute confusion related to pain meds throughout the shift  Outcome: Progressing     Problem: Fall/Injury  Goal: Not fall by end of shift  Outcome: Progressing  Goal: Be free from injury by end of the shift  Outcome: Progressing  Goal: Verbalize understanding of personal risk factors for fall in the hospital  Outcome: Progressing  Goal: Verbalize understanding of risk factor reduction measures to prevent injury from fall in the home  Outcome: Progressing  Goal: Use assistive devices by end of the shift  Outcome: Progressing  Goal: Pace activities to prevent  fatigue by end of the shift  Outcome: Progressing     Problem: Pain  Goal: My pain/discomfort is manageable  Outcome: Progressing     Problem: Safety  Goal: Patient will be injury free during hospitalization  Outcome: Progressing  Goal: I will remain free of falls  Outcome: Progressing     Problem: Daily Care  Goal: Daily care needs are met  Outcome: Progressing     Problem: Psychosocial Needs  Goal: Demonstrates ability to cope with hospitalization/illness  Outcome: Progressing  Goal: Collaborate with me, my family, and caregiver to identify my specific goals  Outcome: Progressing     Problem: Discharge Barriers  Goal: My discharge needs are met  Outcome: Progressing   The patient's goals for the shift include      The clinical goals for the shift include Pt will have minimal pain this shift, will receive adequate rest.    Over the shift, the patient did have minimal pain and received adequate rest.

## 2024-01-21 NOTE — PROGRESS NOTES
Physical Therapy                 Therapy Communication Note    Patient Name: Fadumo Corona  MRN: [id number]  Today's Date: 1/21/2024     Discipline: Physical Therapy    Missed Visit Reason: Missed Visit Reason: Other (Comment)    Missed Time: Attempt    Comment: Pt DC's and leaving at 1300.

## 2024-01-21 NOTE — PROGRESS NOTES
Fadumo Corona is a 89 y.o. female on day 5 of admission presenting with Fall, initial encounter.      Subjective   Patient doing well.  Voiding on her own.  No other issues.  Had a bowel movement.  Awaiting transportation to a facility       Objective     Last Recorded Vitals  /70   Pulse 60   Temp 36.4 °C (97.5 °F) (Temporal)   Resp 20   Wt 50.8 kg (112 lb)   SpO2 100%   Intake/Output last 3 Shifts:    Intake/Output Summary (Last 24 hours) at 1/21/2024 1020  Last data filed at 1/21/2024 0500  Gross per 24 hour   Intake --   Output 2250 ml   Net -2250 ml       Admission Weight  Weight: 50.8 kg (112 lb) (01/16/24 0956)    Daily Weight  01/16/24 : 50.8 kg (112 lb)      Physical Exam:  General: Not in acute distress, alert  HEENT: PERRLA, head intact and normocephalic  Neck: Normal to inspection  Lungs: Clear to auscultation, work of breathing within normal limit  Cardiac: Regular rate and rhythm  Abdomen: Soft nontender, positive bowel sounds  : Exam deferred  Skin: Intact  Hematology: No petechia or excessive ecchymosis  Musculoskeletal: Hip dressing without any bleeding  Neurological: Alert awake oriented, no focal deficit, cranial nerves grossly intact  Psych: No suicidal ideation or homicidal ideation    Relevant Results  Scheduled medications  amLODIPine, 5 mg, oral, Daily  atenolol, 25 mg, oral, Daily  bisacodyl, 10 mg, rectal, Daily  enoxaparin, 30 mg, subcutaneous, q24h  hydroxychloroquine, 200 mg, oral, Daily  hydrOXYzine HCL, 25 mg, oral, Nightly  latanoprost, 1 drop, Both Eyes, Nightly  levothyroxine, 100 mcg, oral, Daily before breakfast  montelukast, 10 mg, oral, Nightly  pantoprazole, 40 mg, oral, Daily before breakfast  polyethylene glycol, 17 g, oral, Daily  sertraline, 25 mg, oral, Daily      Continuous medications     PRN medications  PRN medications: acetaminophen, docusate sodium, melatonin, traMADol   No results found for this or any previous visit (from the past 24 hour(s)).      XR hip right with pelvis when performed 2 or 3 views    Result Date: 1/18/2024  Interpreted By:  lEeno Guy, STUDY: XR HIP RIGHT WITH PELVIS WHEN PERFORMED 2 OR 3 VIEWS;  1/18/2024 11:57 am   INDICATION: Signs/Symptoms:post-op Right say arthroplasty.   COMPARISON: None.   ACCESSION NUMBER(S): [id number]   ORDERING CLINICIAN: LUIS MANUEL CHIRINOS   FINDINGS: Right hip hemiarthroplasty in anatomic alignment. Postoperative soft tissue gas. Skin staples.       New right hip arthroplasty in anatomic alignment.     Signed by: Eleno Guy 1/18/2024 12:00 PM Dictation workstation:   [id number]    ECG 12 lead    Result Date: 1/17/2024  Sinus rhythm with Premature atrial complexes Incomplete right bundle branch block Left anterior fascicular block Left ventricular hypertrophy with repolarization abnormality Cannot rule out Septal infarct (cited on or before 23-JUN-2016) Abnormal ECG When compared with ECG of 23-JUN-2016 06:41, Premature atrial complexes are now Present Questionable change in initial forces of Septal leads Confirmed by Margo Perez ([id number]) on 1/17/2024 11:58:36 AM    ECG 12 lead    Result Date: 1/17/2024  Normal sinus rhythm Incomplete right bundle branch block Left anterior fascicular block Left ventricular hypertrophy with repolarization abnormality Cannot rule out Septal infarct (cited on or before 23-JUN-2016) Abnormal ECG When compared with ECG of 16-JAN-2024 10:43, (unconfirmed) Premature atrial complexes are no longer Present Confirmed by Margo Perez ([id number]) on 1/17/2024 11:58:02 AM    Transthoracic Echo (TTE) Complete    Result Date: 1/17/2024            [facility] [id number] [facility] [id number]    Tel [phone] Fax [phone] TRANSTHORACIC ECHOCARDIOGRAM REPORT  Patient Name:      LUPE CADET      Reading Physician:    [id number] Rocael Montes De Oca MD Study Date:        1/17/2024             Ordering  Provider:    [id number] GINO BROWN MRN/PID:           [id number]              Fellow: Accession#:        [id number]          Nurse: Date of Birth/Age: 11/20/1932 / 89 years Sonographer:          Eugenia Najera RDCS Gender:            F                     Additional Staff: Height:            152.00 cm             Admit Date: Weight:            50.80 kg              Admission Status:     Inpatient -                                                                Routine BSA:               1.46 m2               Department Location:  [facility] Echo Lab Blood Pressure: 121 /54 mmHg Study Type:    TRANSTHORACIC ECHO (TTE) COMPLETE Diagnosis/ICD: Elevated Troponin-R79.89; Other chest pain-R07.89 Indication:    elevated troponin; chest pain  Study Detail: The following Echo studies were performed: 2D, M-Mode, Doppler and               color flow.  PHYSICIAN INTERPRETATION: Left Ventricle: Left ventricular systolic function is normal, with an estimated ejection fraction of 60-65%. Estimated left ventricular mass is increased. There are no regional wall motion abnormalities. The left ventricular cavity size is normal. There is mild concentric left ventricular hypertrophy. Spectral Doppler shows an impaired relaxation pattern of left ventricular diastolic filling. Left Atrium: The left atrium is normal in size. Right Ventricle: The right ventricle is normal in size. There is normal right ventricular global systolic function. Right Atrium: The right atrium is normal in size. Aortic Valve: The aortic valve appears abnormal. There is trivial aortic valve regurgitation. The peak instantaneous gradient of the aortic valve is 10.8 mmHg. There is aortic sclerosis with normal leaflet mobility. Mitral Valve: The mitral valve is abnormal. There is mild to moderate mitral annular calcification. There is trace mitral  valve regurgitation. Tricuspid Valve: The tricuspid valve is structurally normal. There is trace tricuspid regurgitation. The right ventricular systolic pressure is unable to be estimated. Pulmonic Valve: The pulmonic valve is structurally normal. There is no indication of pulmonic valve regurgitation. Pericardium: There is no pericardial effusion noted. Aorta: The aortic root is normal.  CONCLUSIONS:  1. Left ventricular systolic function is normal with a 60-65% estimated ejection fraction.  2. Increased LV mass.  3. Spectral Doppler shows an impaired relaxation pattern of left ventricular diastolic filling.  4. Aortic valve appears abnormal.  5. There is aortic sclerosis with normal leaflet mobility. QUANTITATIVE DATA SUMMARY: 2D MEASUREMENTS:                           Normal Ranges: LAs:           3.00 cm    (2.7-4.0cm) IVSd:          1.30 cm    (0.6-1.1cm) LVPWd:         1.40 cm    (0.6-1.1cm) LVIDd:         3.90 cm    (3.9-5.9cm) LVIDs:         2.70 cm LV Mass Index: 130.8 g/m2 LV % FS        30.8 % LA VOLUME:                             Normal Ranges: LA Area A4C:     12.0 cm2 LA Area A2C:     13.8 cm2 LA Volume Index: 19.9 ml/m2 M-MODE MEASUREMENTS:                  Normal Ranges: Ao Root: 3.10 cm (2.0-3.7cm) AoV Exc: 1.40 cm (1.5-2.5cm) AORTA MEASUREMENTS:                    Normal Ranges: AoV Exc:   1.40 cm (1.5-2.5cm) Asc Ao, d: 3.30 cm (2.1-3.4cm) LV SYSTOLIC FUNCTION BY 2D PLANIMETRY (MOD):                     Normal Ranges: EF-A4C View: 66.6 % (>=55%) EF-A2C View: 53.4 % EF-Biplane:  61.3 % LV DIASTOLIC FUNCTION:                        Normal Ranges: MV Peak E:    0.90 m/s (0.7-1.2 m/s) MV Peak A:    1.15 m/s (0.42-0.7 m/s) E/A Ratio:    0.78     (1.0-2.2) MV e'         0.07 m/s (>8.0) MV lateral e' 0.09 m/s MV medial e'  0.05 m/s E/e' Ratio:   12.86    (<8.0) MITRAL VALVE:                 Normal Ranges: MV DT: 243 msec (150-240msec) AORTIC VALVE:                          Normal Ranges: AoV Vmax:       1.64 m/s  (<=1.7m/s) AoV Peak PG:   10.8 mmHg (<20mmHg) LVOT Max Nikita:  1.38 m/s  (<=1.1m/s) LVOT VTI:      35.10 cm LVOT Diameter: 1.80 cm   (1.8-2.4cm) AoV Area,Vmax: 2.14 cm2  (2.5-4.5cm2) AORTIC INSUFFICIENCY: AI Vmax:       2.05 m/s AI Half-time:  487 msec AI Decel Rate: 123.00 cm/s2  RIGHT VENTRICLE: RV Basal 2.80 cm RV Mid   2.20 cm RV Major 6.3 cm TAPSE:   20.7 mm RV s'    0.13 m/s TRICUSPID VALVE/RVSP:                            Normal Ranges: Peak TR Velocity: 0.96 m/s RV Syst Pressure: 6.7 mmHg (< 30mmHg) PULMONIC VALVE:                         Normal Ranges: PV Accel Time: 102 msec (>120ms)  [id number] Rocael Montes De Oca MD Electronically signed on 1/17/2024 at 10:18:22 AM  ** Final **     XR femur right 2+ views    Result Date: 1/16/2024  Interpreted By:  Zay Selby, STUDY: XR FEMUR RIGHT 2+ VIEWS; XR PELVIS 1-2 VIEWS; ;  1/16/2024 12:32 pm   INDICATION: Signs/Symptoms:fall.   COMPARISON: None.   ACCESSION NUMBER(S): [id number]; [id number]   ORDERING CLINICIAN: JACQUELYN GARIBAY   FINDINGS: Four views of the right femur and single frontal view the pelvis. There is a impacted and mildly proximally retracted fracture of the humeral neck. No hip dislocation. Partially imaged right knee total arthroplasty without acute hardware complication.       Acute appearing fracture of the femoral neck.     MACRO: None   Signed by: Zay Selby 1/16/2024 12:42 PM Dictation workstation:   [id number]    XR pelvis 1-2 views    Result Date: 1/16/2024  Interpreted By:  Zay Selby, STUDY: XR FEMUR RIGHT 2+ VIEWS; XR PELVIS 1-2 VIEWS; ;  1/16/2024 12:32 pm   INDICATION: Signs/Symptoms:fall.   COMPARISON: None.   ACCESSION NUMBER(S): [id number]; [id number]   ORDERING CLINICIAN: JACQUELYN GARIBAY   FINDINGS: Four views of the right femur and single frontal view the pelvis. There is a impacted and mildly proximally retracted fracture of the humeral neck. No hip dislocation. Partially imaged right knee total arthroplasty without  acute hardware complication.       Acute appearing fracture of the femoral neck.     MACRO: None   Signed by: Zay Selby 1/16/2024 12:42 PM Dictation workstation:   [id number]    CT abdomen pelvis wo IV contrast    Result Date: 1/16/2024  Interpreted By:  Cortez Horowitz, STUDY: CT ABDOMEN PELVIS WO IV CONTRAST;  1/16/2024 11:04 am   INDICATION: Signs/Symptoms:fall with abdominal pain.   COMPARISON: 03/31/2014   ACCESSION NUMBER(S): [id number]   ORDERING CLINICIAN: JACQUELYN GARIBAY   TECHNIQUE: CT of the abdomen and pelvis was performed. Contiguous axial images were obtained at 3 mm slice thickness through the abdomen and pelvis. Coronal and sagittal reconstructions at 3 mm slice thickness were performed.  No intravenous contrast was administered.   FINDINGS: Please note that the evaluation of vessels, lymph nodes and organs is limited without intravenous contrast.   LOWER CHEST: No bile duct dilatation.   ABDOMEN:   LIVER: The liver is grossly unremarkable. No definite focal masses within the limits of this unenhanced study.   BILE DUCTS: Prominence of the common bile duct may be due to prior cholecystectomy. Correlate with clinical laboratory findings.       GALLBLADDER: The gallbladder is not seen and may be surgically absent.   PANCREAS: Unremarkable pancreas.   SPLEEN: Unremarkable spleen.   ADRENAL GLANDS: The adrenal masses.   KIDNEYS AND URETERS: Atrophic right kidney. Likely parapelvic left renal cysts. No hydronephrosis.   PELVIS:   BLADDER: Grossly unremarkable.   REPRODUCTIVE ORGANS: No definite masses.   BOWEL: No evidence of bowel obstruction. There is sigmoid diverticulosis. Minimal fat stranding in the left lower quadrant. Correlate with concern for minimal acute diverticulitis. No free fluid abscess.   VESSELS: Aortic calcification. No aneurysmal dilatation.   PERITONEUM/RETROPERITONEUM/LYMPH NODES: No retroperitoneal adenopathy. No ascites. ABDOMINAL WALL: Grossly unremarkable.   BONES: Grade 1  anterolisthesis of L3 on L4. Significant compression fracture deformity of T11 of indeterminate age. There is associated retropulsion. This is new since the prior examination of 03/31/2014. Slightly displaced right femoral neck fracture of indeterminate age. This is new since the prior examination of 03/21/2023.       Significant compression fracture deformity of T11 vertebral body of indeterminate age. This may be acute. There is associated retropulsion. Consider MRI to better evaluate central canal and neural foraminal as clinically warranted.   Displaced right femoral neck fracture of indeterminate age. This may be acute.   Small to moderate hiatal hernia.   Prominence of the common bile duct could be due to prior cholecystectomy. Correlate with clinical and laboratory findings.   Asymmetrically smaller right kidney.   Sigmoid diverticulosis with question of minimal fat stranding. Can exclude minimal acute diverticulitis. No free fluid or abscess.     MACRO: None   Signed by: Cortez Horowitz 1/16/2024 11:59 AM Dictation workstation:   [id number]    CT cervical spine wo IV contrast    Result Date: 1/16/2024  Interpreted By:  Cortez Horowitz, STUDY: CT CERVICAL SPINE WO IV CONTRAST;  1/16/2024 11:04 am   INDICATION: Signs/Symptoms:fall.   COMPARISON: 03/21/2023   ACCESSION NUMBER(S): [id number]   ORDERING CLINICIAN: MELISSA LANDEROS   TECHNIQUE: Axial CT images of the cervical spine are obtained. Axial, coronal and sagittal reconstructions are provided for review.   FINDINGS: Grade 1 anterolisthesis of C3 on C4. Grade 1 anterolisthesis of C7 on T1. Disc space loss at C5/C6. Endplate sclerosis and osteophytes are seen at multiple levels. No erosions. No acute fracture-dislocation.       No evidence for an acute fracture or subluxation of the cervical spine. Discogenic degenerative changes.   MACRO: None   Signed by: Cortez Horowitz 1/16/2024 11:51 AM Dictation workstation:   [id number]    XR chest 1 view    Result Date:  1/16/2024  Interpreted By:  Cortez Horowitz, STUDY: XR CHEST 1 VIEW;  1/16/2024 11:16 am   INDICATION: Signs/Symptoms:fall.   COMPARISON: 03/21/2023   ACCESSION NUMBER(S): [id number]   ORDERING CLINICIAN: MELISSA LANDEROS   FINDINGS:         CARDIOMEDIASTINAL SILHOUETTE: Cardiomediastinal silhouette is normal in size and configuration.   LUNGS: Lungs are clear.   ABDOMEN: No remarkable upper abdominal findings.   BONES: Degenerative changes of both shoulders..       1.  No evidence of acute cardiopulmonary process.       MACRO: None   Signed by: Cortez Horowitz 1/16/2024 11:47 AM Dictation workstation:   [id number]    CT head wo IV contrast    Result Date: 1/16/2024  Interpreted By:  Cortez Horowitz, STUDY: CT HEAD WO IV CONTRAST;  1/16/2024 11:04 am   INDICATION: Signs/Symptoms:fall.   COMPARISON: 03/21/2023   ACCESSION NUMBER(S): [id number]   ORDERING CLINICIAN: ARUBA LANDEROS   TECHNIQUE: Noncontrast axial CT scan of head was performed. Angled reformats in brain and bone windows were generated. The images were reviewed in bone, brain, blood and soft tissue windows.   FINDINGS: The ventricles, cisterns and sulci are prominent, consistent with mild diffuse volume loss. There are areas of nonspecific white matter hypodensity, which are probably age-related or microvascular in nature.   Gray-white differentiation is intact and there is no evidence of acute cortical infarct. 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 No mass, mass effect or midline shift is seen. There is no evidence of hemorrhage.   The visualized paranasal sinuses are clear.       No evidence of acute cortical infarct or intracranial hemorrhage.   Stable chronic changes.   MACRO: None   Signed by: Cortez Horowitz 1/16/2024 11:45 AM Dictation workstation:   [id number]          Assessment/Plan   Fadumo Corona is a 89 y.o. female on day 5 of admission presenting with Fall, initial encounter.  Principal Problem:     Fall, initial encounter  Active Problems:    Closed displaced subtrochanteric fracture of right femur (CMS/HCC)      89-year-old female with past medical history of esophageal stricture, vocal cord dysfunction, GERD, CKD stage III, hypothyroidism, hypertension, hyperlipidemia presented after unwitnessed fall and dizziness.  Patient was found to have T11 fracture along with right hip fracture.  Patient was taken hemiarthroplasty by Dr. Jeremy Parry.  Patient tolerated the procedure well.  Orthopedic surgery evaluated patient and recommended discharge to skilled nursing facility based on obesity recommendation.  Patient was constipated and was having urinary retention because of the.  Once constipation resolved she is voiding appropriately.  Will place patient on DVT prophylaxis with aspirin 81 mg twice daily for 30 days for DVT prophylaxis.     Seen and evaluated by orthopedic surgery and asked today  Medically remained stable  Awaiting transportation  Continue with aspirin 81 mg twice a day for DVT prophylaxis  Continue with PT OT     Plan discussed with patient at bedside    Low level of MDM based on above issue and discussing plan    This note is created using voice recognition software. All efforts are made to minimize errors, if there are errors there due to transcription.    Spike King  Hospitalist

## 2024-01-21 NOTE — PROGRESS NOTES
Due to transport delay pt did not leave 1/20. Pt is scheduled to be transported today at 1pm to [facility]. TC to dtr Anju to update. She is requesting a second call when pt leaves the building as she plans to meet the pt at the facility. Bedside RONALDO Marie updated with this request.

## 2024-01-21 NOTE — PROGRESS NOTES
Hip surgery    Progress Note    Subjective:     Post-Operative Day: 4 Status Post right Hip Arthroplasty  Systemic or Specific Complaints:No Complaints    Objective:     Visit Vitals  /75 (BP Location: Right arm, Patient Position: Lying)   Pulse 65   Temp 36.8 °C (98.2 °F) (Temporal)   Resp 16       General: alert, pale, appears stated age, and cooperative   Wound: wound clean and dry no evidence of infection   Motion: Painful range of Motion   DVT Exam: No evidence of DVT seen on physical exam.       NVI in lower extremity. Thigh swollen but soft. Moving foot and ankle.          Assessment:     Status Post right Hip Arthroplasty.  stable      Plan:      1: Continues current post-op course :  2:  Continue Deep venous thrombosis prophylaxis  3:  Continue physical therapy  4:  Continue Pain Control  Await discharge to rehab

## 2024-01-22 ENCOUNTER — NURSING HOME VISIT (OUTPATIENT)
Dept: POST ACUTE CARE | Facility: EXTERNAL LOCATION | Age: 89
End: 2024-01-22
Payer: MEDICARE

## 2024-01-22 DIAGNOSIS — S72.21XS CLOSED DISPLACED SUBTROCHANTERIC FRACTURE OF RIGHT FEMUR, SEQUELA: ICD-10-CM
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
## 2024-01-22 DIAGNOSIS — R29.6 MULTIPLE FALLS: ICD-10-CM

## 2024-01-22 DIAGNOSIS — E43 SEVERE PROTEIN-CALORIE MALNUTRITION (MULTI): Primary | ICD-10-CM

## 2024-01-22 DIAGNOSIS — N18.31 STAGE 3A CHRONIC KIDNEY DISEASE (MULTI): ICD-10-CM

## 2024-01-22 DIAGNOSIS — I10 PRIMARY HYPERTENSION: ICD-10-CM

## 2024-01-22 PROCEDURE — 99306 1ST NF CARE HIGH MDM 50: CPT | Performed by: STUDENT IN AN ORGANIZED HEALTH CARE EDUCATION/TRAINING PROGRAM

## 2024-01-22 ASSESSMENT — ENCOUNTER SYMPTOMS
CONSTITUTIONAL NEGATIVE: 1
GASTROINTESTINAL NEGATIVE: 1
NEUROLOGICAL NEGATIVE: 1
MUSCULOSKELETAL NEGATIVE: 1
PSYCHIATRIC NEGATIVE: 1
RESPIRATORY NEGATIVE: 1
CARDIOVASCULAR NEGATIVE: 1

## 2024-01-22 NOTE — LETTER
Patient: Fadumo Corona  : 1932    Encounter Date: 2024    Subjective  Patient ID: Fadumo Corona is a 89 y.o. female.    Patient is a 89-year-old female with past medical history of esophageal stricture, GERD, CKD, hypothyroidism, hypertension, hyperlipidemia, who presents to the ED with unwitnessed fall and dizziness.  Patient in the emergency room was found to have a T11 fracture along with right hip fracture and was admitted for further management.  Patient underwent surgical fixation of her right hip, and after surgery, did have some slight hypoxia that resolved as well as urinary retention that required straight catheterization.  Otherwise, patient was discharged to SNF in stable condition.  Evaluation, patient overall feels well.  She was not discharged with any narcotic medication however was taking tramadol in the hospital.  On evaluation, patient reports that she does not need this medication and overall her pain is stable.  Otherwise, states no acute complaints or concerns and overall feels well.  No additional issues at this time.    Review of Systems   Constitutional: Negative.    HENT: Negative.     Respiratory: Negative.     Cardiovascular: Negative.    Gastrointestinal: Negative.    Musculoskeletal: Negative.    Neurological: Negative.    Psychiatric/Behavioral: Negative.         ObjectiveVitals Reviewed via facility EMR   Physical Exam  Constitutional:       General: She is not in acute distress.     Appearance: She is not ill-appearing.   Eyes:      Pupils: Pupils are equal, round, and reactive to light.   Cardiovascular:      Rate and Rhythm: Normal rate and regular rhythm.      Pulses: Normal pulses.      Heart sounds: No murmur heard.  Pulmonary:      Effort: No respiratory distress.      Breath sounds: No wheezing.   Abdominal:      General: Abdomen is flat. Bowel sounds are normal. There is no distension.   Musculoskeletal:      Right lower leg: No edema.      Left lower leg:  No edema.   Skin:     General: Skin is warm and dry.   Neurological:      Mental Status: She is alert. Mental status is at baseline.      Cranial Nerves: No cranial nerve deficit.      Motor: No weakness.   Psychiatric:         Mood and Affect: Mood normal.         Behavior: Behavior normal.         Assessment/Plan  Diagnoses and all orders for this visit:  Severe protein-calorie malnutrition (CMS/HCC)  Stage 3a chronic kidney disease (CMS/HCC)  Fall, initial encounter  Multiple falls  Closed displaced subtrochanteric fracture of right femur, sequela  Primary hypertension    Patient seen and evaluated.  Hospital course extensively reviewed.  Continue supportive care, monitor for signs and symptoms of pain.  We will discontinue narcotic medication as patient reports that she is not requiring this.  Slightly anemic after the surgery which is to be suspected, we will continue to monitor this with weekly labs as well as avoid nephrotoxic medications.  Continue follow-up with her specialist as outpatient.  Continue supportive care, PT OT.  We will obtain weekly labs.  Staff updated with care plan and is agreeable.    Reviewed and approved by HERMAN SILVA on 1/22/24 at 10:10 PM.       Electronically Signed By: Herman Silva DO   1/22/24 10:10 PM

## 2024-01-23 ENCOUNTER — TELEPHONE (OUTPATIENT)
Dept: ORTHOPEDIC SURGERY | Facility: CLINIC | Age: 89
End: 2024-01-23

## 2024-01-23 NOTE — PROGRESS NOTES
Subjective   Patient ID: Fadumo Corona is a 89 y.o. female.    Patient is a 89-year-old female with past medical history of esophageal stricture, GERD, CKD, hypothyroidism, hypertension, hyperlipidemia, who presents to the ED with unwitnessed fall and dizziness.  Patient in the emergency room was found to have a T11 fracture along with right hip fracture and was admitted for further management.  Patient underwent surgical fixation of her right hip, and after surgery, did have some slight hypoxia that resolved as well as urinary retention that required straight catheterization.  Otherwise, patient was discharged to SNF in stable condition.  Evaluation, patient overall feels well.  She was not discharged with any narcotic medication however was taking tramadol in the hospital.  On evaluation, patient reports that she does not need this medication and overall her pain is stable.  Otherwise, states no acute complaints or concerns and overall feels well.  No additional issues at this time.    Review of Systems   Constitutional: Negative.    HENT: Negative.     Respiratory: Negative.     Cardiovascular: Negative.    Gastrointestinal: Negative.    Musculoskeletal: Negative.    Neurological: Negative.    Psychiatric/Behavioral: Negative.         Objective Vitals Reviewed via facility EMR   Physical Exam  Constitutional:       General: She is not in acute distress.     Appearance: She is not ill-appearing.   Eyes:      Pupils: Pupils are equal, round, and reactive to light.   Cardiovascular:      Rate and Rhythm: Normal rate and regular rhythm.      Pulses: Normal pulses.      Heart sounds: No murmur heard.  Pulmonary:      Effort: No respiratory distress.      Breath sounds: No wheezing.   Abdominal:      General: Abdomen is flat. Bowel sounds are normal. There is no distension.   Musculoskeletal:      Right lower leg: No edema.      Left lower leg: No edema.   Skin:     General: Skin is warm and dry.   Neurological:       Mental Status: She is alert. Mental status is at baseline.      Cranial Nerves: No cranial nerve deficit.      Motor: No weakness.   Psychiatric:         Mood and Affect: Mood normal.         Behavior: Behavior normal.         Assessment/Plan   Diagnoses and all orders for this visit:  Severe protein-calorie malnutrition (CMS/HCC)  Stage 3a chronic kidney disease (CMS/HCC)  Fall, initial encounter  Multiple falls  Closed displaced subtrochanteric fracture of right femur, sequela  Primary hypertension    Patient seen and evaluated.  Hospital course extensively reviewed.  Continue supportive care, monitor for signs and symptoms of pain.  We will discontinue narcotic medication as patient reports that she is not requiring this.  Slightly anemic after the surgery which is to be suspected, we will continue to monitor this with weekly labs as well as avoid nephrotoxic medications.  Continue follow-up with her specialist as outpatient.  Continue supportive care, PT OT.  We will obtain weekly labs.  Staff updated with care plan and is agreeable.    Reviewed and approved by ESTEFANÍA SILVA on 1/22/24 at 10:10 PM.

## 2024-01-24 ENCOUNTER — NURSING HOME VISIT (OUTPATIENT)
Dept: POST ACUTE CARE | Facility: EXTERNAL LOCATION | Age: 89
End: 2024-01-24
Payer: MEDICARE

## 2024-01-24 DIAGNOSIS — I10 PRIMARY HYPERTENSION: ICD-10-CM

## 2024-01-24 DIAGNOSIS — S22.000S COMPRESSION FRACTURE OF THORACIC VERTEBRA, UNSPECIFIED THORACIC VERTEBRAL LEVEL, SEQUELA: ICD-10-CM

## 2024-01-24 DIAGNOSIS — R29.6 MULTIPLE FALLS: ICD-10-CM

## 2024-01-24 DIAGNOSIS — E43 SEVERE PROTEIN-CALORIE MALNUTRITION (MULTI): Primary | ICD-10-CM

## 2024-01-24 DIAGNOSIS — R53.1 GENERALIZED WEAKNESS: ICD-10-CM

## 2024-01-24 PROCEDURE — 99309 SBSQ NF CARE MODERATE MDM 30: CPT | Performed by: STUDENT IN AN ORGANIZED HEALTH CARE EDUCATION/TRAINING PROGRAM

## 2024-01-24 NOTE — LETTER
Patient: Fadumo Corona  : 1932    Encounter Date: 2024    Subjective  Patient ID: Fadumo Corona is a 89 y.o. female.    Patient seen and examined at bedside.  She regards that she is doing well and son is also present as well.  Patient regards that she is working well with therapy, however also endorses that recently she has been having more difficulty with mobility especially at home.  States that she is interested in long-term care as she feels like this is a safer option.  In addition, there is concerns about compression fracture.  This was reviewed with family at bedside and noted to be age indeterminant.  Recommend compliance with brace and we will plan on close outpatient follow-up.        Review of Systems   Constitutional: Negative.    HENT: Negative.     Respiratory: Negative.     Cardiovascular: Negative.    Gastrointestinal: Negative.    Musculoskeletal:  Positive for arthralgias and back pain.   Neurological: Negative.    Psychiatric/Behavioral: Negative.         ObjectiveVitals Reviewed via facility EMR   Physical Exam  Constitutional:       General: She is not in acute distress.     Appearance: She is not ill-appearing.   Eyes:      Pupils: Pupils are equal, round, and reactive to light.   Cardiovascular:      Rate and Rhythm: Normal rate and regular rhythm.      Pulses: Normal pulses.      Heart sounds: No murmur heard.  Pulmonary:      Effort: No respiratory distress.      Breath sounds: No wheezing.   Abdominal:      General: Abdomen is flat. Bowel sounds are normal. There is no distension.   Musculoskeletal:         General: Tenderness present.      Right lower leg: No edema.      Left lower leg: No edema.      Comments: Over lumbar spine tenderness   Skin:     General: Skin is warm and dry.   Neurological:      Mental Status: She is alert. Mental status is at baseline.      Cranial Nerves: No cranial nerve deficit.      Motor: No weakness.   Psychiatric:         Mood and Affect:  Mood normal.         Behavior: Behavior normal.         Assessment/Plan  Diagnoses and all orders for this visit:  Severe protein-calorie malnutrition (CMS/HCC)  Primary hypertension  Compression fracture of thoracic vertebra, unspecified thoracic vertebral level, sequela  Multiple falls  Generalized weakness      Patient seen and overall medically stable, discussed with social work about discharge planning, patient may be a candidate for assisted living or long-term care.  Family is interested in looking at multiple options.  Continue supportive care, reviewed imaging, noted age indeterminant fracture.  Family updated at bedside.  Continue supportive care, PT OT.  Monitor weekly labs.    Reviewed and approved by HERMAN SILVA on 1/25/24 at 10:55 PM.       Electronically Signed By: Herman Silva DO   1/25/24 10:55 PM

## 2024-01-25 ASSESSMENT — ENCOUNTER SYMPTOMS
ARTHRALGIAS: 1
BACK PAIN: 1
NEUROLOGICAL NEGATIVE: 1
CONSTITUTIONAL NEGATIVE: 1
GASTROINTESTINAL NEGATIVE: 1
CARDIOVASCULAR NEGATIVE: 1
PSYCHIATRIC NEGATIVE: 1
RESPIRATORY NEGATIVE: 1

## 2024-01-26 ENCOUNTER — NURSING HOME VISIT (OUTPATIENT)
Dept: POST ACUTE CARE | Facility: EXTERNAL LOCATION | Age: 89
End: 2024-01-26
Payer: MEDICARE

## 2024-01-26 DIAGNOSIS — I10 PRIMARY HYPERTENSION: Primary | ICD-10-CM

## 2024-01-26 DIAGNOSIS — N18.31 STAGE 3A CHRONIC KIDNEY DISEASE (MULTI): ICD-10-CM

## 2024-01-26 DIAGNOSIS — I95.1 ORTHOSTATIC HYPOTENSION: ICD-10-CM

## 2024-01-26 DIAGNOSIS — S22.000S COMPRESSION FRACTURE OF THORACIC VERTEBRA, UNSPECIFIED THORACIC VERTEBRAL LEVEL, SEQUELA: ICD-10-CM

## 2024-01-26 DIAGNOSIS — R26.2 DIFFICULTY IN WALKING, NOT ELSEWHERE CLASSIFIED: ICD-10-CM

## 2024-01-26 DIAGNOSIS — E43 SEVERE PROTEIN-CALORIE MALNUTRITION (MULTI): ICD-10-CM

## 2024-01-26 PROCEDURE — 99309 SBSQ NF CARE MODERATE MDM 30: CPT | Performed by: STUDENT IN AN ORGANIZED HEALTH CARE EDUCATION/TRAINING PROGRAM

## 2024-01-26 ASSESSMENT — ENCOUNTER SYMPTOMS
DIZZINESS: 1
RESPIRATORY NEGATIVE: 1
CONSTITUTIONAL NEGATIVE: 1
CARDIOVASCULAR NEGATIVE: 1
GASTROINTESTINAL NEGATIVE: 1
MUSCULOSKELETAL NEGATIVE: 1
LIGHT-HEADEDNESS: 1
PSYCHIATRIC NEGATIVE: 1

## 2024-01-26 NOTE — LETTER
Patient: Fadumo Corona  : 1932    Encounter Date: 2024    Subjective  Patient ID: Fadumo Corona is a 89 y.o. female.    Patient seen and evaluated.  Physical therapy has started noticing that patient has been somewhat orthostatic.  Especially with physical therapy.  Patient endorses that she has not been eating or drinking as much as she used to do.  Otherwise, states no issues or concerns.  Pain is overall well-controlled.  Denies any additional issues.        Review of Systems   Constitutional: Negative.    HENT: Negative.     Respiratory: Negative.     Cardiovascular: Negative.    Gastrointestinal: Negative.    Musculoskeletal: Negative.    Neurological:  Positive for dizziness and light-headedness.   Psychiatric/Behavioral: Negative.         ObjectiveVitals Reviewed via facility EMR   Physical Exam  Constitutional:       General: She is not in acute distress.     Appearance: She is not ill-appearing.      Comments: Pleasant elderly female   Eyes:      Pupils: Pupils are equal, round, and reactive to light.   Cardiovascular:      Rate and Rhythm: Normal rate and regular rhythm.      Pulses: Normal pulses.      Heart sounds: No murmur heard.  Pulmonary:      Effort: No respiratory distress.      Breath sounds: No wheezing.   Abdominal:      General: Abdomen is flat. Bowel sounds are normal. There is no distension.   Musculoskeletal:      Right lower leg: No edema.      Left lower leg: No edema.   Skin:     General: Skin is warm and dry.   Neurological:      Mental Status: She is alert. Mental status is at baseline.      Cranial Nerves: No cranial nerve deficit.      Motor: No weakness.   Psychiatric:         Mood and Affect: Mood normal.         Behavior: Behavior normal.         Assessment/Plan  Diagnoses and all orders for this visit:  Primary hypertension  Severe protein-calorie malnutrition (CMS/HCC)  Stage 3a chronic kidney disease (CMS/HCC)  Compression fracture of thoracic vertebra,  unspecified thoracic vertebral level, sequela  Difficulty in walking, not elsewhere classified  Orthostatic hypotension    Patient seen and evaluated.  Noted to be orthostatic positive with therapy.  We will obtain labs today.  Recommend IV fluids with normal saline  42 L.  Suspect underlying dehydration.  Will obtain orthostatic vitals for the next 3 days.  Recommend holding blood pressure medication for systolic blood pressure less than 110.  Continue supportive care.  Family updated at bedside.    Post visit  After evaluation, patient family endorses that she is slightly more confused.  Will obtain a UA per family request.  Continue supportive care.    Reviewed and approved by HERMAN SILVA on 1/26/24 at 10:19 PM.       Electronically Signed By: Herman Silva DO   1/26/24 10:19 PM

## 2024-01-26 NOTE — PROGRESS NOTES
Subjective   Patient ID: Fadumo Corona is a 89 y.o. female.    Patient seen and examined at bedside.  She regards that she is doing well and son is also present as well.  Patient regards that she is working well with therapy, however also endorses that recently she has been having more difficulty with mobility especially at home.  States that she is interested in long-term care as she feels like this is a safer option.  In addition, there is concerns about compression fracture.  This was reviewed with family at bedside and noted to be age indeterminant.  Recommend compliance with brace and we will plan on close outpatient follow-up.        Review of Systems   Constitutional: Negative.    HENT: Negative.     Respiratory: Negative.     Cardiovascular: Negative.    Gastrointestinal: Negative.    Musculoskeletal:  Positive for arthralgias and back pain.   Neurological: Negative.    Psychiatric/Behavioral: Negative.         Objective Vitals Reviewed via facility EMR   Physical Exam  Constitutional:       General: She is not in acute distress.     Appearance: She is not ill-appearing.   Eyes:      Pupils: Pupils are equal, round, and reactive to light.   Cardiovascular:      Rate and Rhythm: Normal rate and regular rhythm.      Pulses: Normal pulses.      Heart sounds: No murmur heard.  Pulmonary:      Effort: No respiratory distress.      Breath sounds: No wheezing.   Abdominal:      General: Abdomen is flat. Bowel sounds are normal. There is no distension.   Musculoskeletal:         General: Tenderness present.      Right lower leg: No edema.      Left lower leg: No edema.      Comments: Over lumbar spine tenderness   Skin:     General: Skin is warm and dry.   Neurological:      Mental Status: She is alert. Mental status is at baseline.      Cranial Nerves: No cranial nerve deficit.      Motor: No weakness.   Psychiatric:         Mood and Affect: Mood normal.         Behavior: Behavior normal.         Assessment/Plan    Diagnoses and all orders for this visit:  Severe protein-calorie malnutrition (CMS/HCC)  Primary hypertension  Compression fracture of thoracic vertebra, unspecified thoracic vertebral level, sequela  Multiple falls  Generalized weakness      Patient seen and overall medically stable, discussed with social work about discharge planning, patient may be a candidate for assisted living or long-term care.  Family is interested in looking at multiple options.  Continue supportive care, reviewed imaging, noted age indeterminant fracture.  Family updated at bedside.  Continue supportive care, PT OT.  Monitor weekly labs.    Reviewed and approved by ESTEFANÍA SILVA on 1/25/24 at 10:55 PM.

## 2024-01-27 NOTE — PROGRESS NOTES
Subjective   Patient ID: Fadumo Corona is a 89 y.o. female.    Patient seen and evaluated.  Physical therapy has started noticing that patient has been somewhat orthostatic.  Especially with physical therapy.  Patient endorses that she has not been eating or drinking as much as she used to do.  Otherwise, states no issues or concerns.  Pain is overall well-controlled.  Denies any additional issues.        Review of Systems   Constitutional: Negative.    HENT: Negative.     Respiratory: Negative.     Cardiovascular: Negative.    Gastrointestinal: Negative.    Musculoskeletal: Negative.    Neurological:  Positive for dizziness and light-headedness.   Psychiatric/Behavioral: Negative.         Objective Vitals Reviewed via facility EMR   Physical Exam  Constitutional:       General: She is not in acute distress.     Appearance: She is not ill-appearing.      Comments: Pleasant elderly female   Eyes:      Pupils: Pupils are equal, round, and reactive to light.   Cardiovascular:      Rate and Rhythm: Normal rate and regular rhythm.      Pulses: Normal pulses.      Heart sounds: No murmur heard.  Pulmonary:      Effort: No respiratory distress.      Breath sounds: No wheezing.   Abdominal:      General: Abdomen is flat. Bowel sounds are normal. There is no distension.   Musculoskeletal:      Right lower leg: No edema.      Left lower leg: No edema.   Skin:     General: Skin is warm and dry.   Neurological:      Mental Status: She is alert. Mental status is at baseline.      Cranial Nerves: No cranial nerve deficit.      Motor: No weakness.   Psychiatric:         Mood and Affect: Mood normal.         Behavior: Behavior normal.         Assessment/Plan   Diagnoses and all orders for this visit:  Primary hypertension  Severe protein-calorie malnutrition (CMS/HCC)  Stage 3a chronic kidney disease (CMS/HCC)  Compression fracture of thoracic vertebra, unspecified thoracic vertebral level, sequela  Difficulty in walking, not  elsewhere classified  Orthostatic hypotension    Patient seen and evaluated.  Noted to be orthostatic positive with therapy.  We will obtain labs today.  Recommend IV fluids with normal saline  42 L.  Suspect underlying dehydration.  Will obtain orthostatic vitals for the next 3 days.  Recommend holding blood pressure medication for systolic blood pressure less than 110.  Continue supportive care.  Family updated at bedside.    Post visit  After evaluation, patient family endorses that she is slightly more confused.  Will obtain a UA per family request.  Continue supportive care.    Reviewed and approved by ESTEFANÍA SILVA on 1/26/24 at 10:19 PM.

## 2024-01-31 ENCOUNTER — NURSING HOME VISIT (OUTPATIENT)
Dept: POST ACUTE CARE | Facility: EXTERNAL LOCATION | Age: 89
End: 2024-01-31
Payer: MEDICARE

## 2024-01-31 DIAGNOSIS — E43 SEVERE PROTEIN-CALORIE MALNUTRITION (MULTI): ICD-10-CM

## 2024-01-31 DIAGNOSIS — N18.31 STAGE 3A CHRONIC KIDNEY DISEASE (MULTI): ICD-10-CM

## 2024-01-31 DIAGNOSIS — S72.21XS CLOSED DISPLACED SUBTROCHANTERIC FRACTURE OF RIGHT FEMUR, SEQUELA: ICD-10-CM

## 2024-01-31 DIAGNOSIS — R53.1 GENERALIZED WEAKNESS: ICD-10-CM

## 2024-01-31 DIAGNOSIS — I10 PRIMARY HYPERTENSION: Primary | ICD-10-CM

## 2024-01-31 DIAGNOSIS — I95.1 ORTHOSTATIC HYPOTENSION: ICD-10-CM

## 2024-01-31 PROCEDURE — 99347 HOME/RES VST EST SF MDM 20: CPT | Performed by: STUDENT IN AN ORGANIZED HEALTH CARE EDUCATION/TRAINING PROGRAM

## 2024-01-31 ASSESSMENT — ENCOUNTER SYMPTOMS
GASTROINTESTINAL NEGATIVE: 1
CONSTITUTIONAL NEGATIVE: 1
CARDIOVASCULAR NEGATIVE: 1
PSYCHIATRIC NEGATIVE: 1
RESPIRATORY NEGATIVE: 1
MUSCULOSKELETAL NEGATIVE: 1
DIZZINESS: 1

## 2024-02-01 NOTE — PROGRESS NOTES
Subjective   Patient ID: Fadumo Corona is a 89 y.o. female.    Patient seen and examined at bedside.  She regards that her strength is overall improving.  Orthostatic vitals have subsequently been negative.  IV fluids have been discontinued secondary to this.  She has been slightly hypertensive for the past couple days.  Otherwise, states she intermittently does Get some dizziness.  Denies any additional issues or concerns.        Review of Systems   Constitutional: Negative.    HENT: Negative.     Respiratory: Negative.     Cardiovascular: Negative.    Gastrointestinal: Negative.    Musculoskeletal: Negative.    Neurological:  Positive for dizziness.   Psychiatric/Behavioral: Negative.         Objective Vitals Reviewed via facility EMR   Physical Exam  Constitutional:       General: She is not in acute distress.     Appearance: She is not ill-appearing.      Comments: Elderly female in NAD   Eyes:      Pupils: Pupils are equal, round, and reactive to light.   Cardiovascular:      Rate and Rhythm: Normal rate and regular rhythm.      Pulses: Normal pulses.      Heart sounds: No murmur heard.  Pulmonary:      Effort: No respiratory distress.      Breath sounds: No wheezing.   Abdominal:      General: Abdomen is flat. Bowel sounds are normal. There is no distension.   Musculoskeletal:      Right lower leg: No edema.      Left lower leg: No edema.   Skin:     General: Skin is warm and dry.   Neurological:      Mental Status: She is alert. Mental status is at baseline.      Cranial Nerves: No cranial nerve deficit.      Motor: No weakness.   Psychiatric:         Mood and Affect: Mood normal.         Behavior: Behavior normal.         Assessment/Plan   Diagnoses and all orders for this visit:  Primary hypertension  Orthostatic hypotension  Severe protein-calorie malnutrition (CMS/HCC)  Stage 3a chronic kidney disease (CMS/HCC)  Closed displaced subtrochanteric fracture of right femur, sequela  Generalized  weakness    Patient seen and examined, orthostatic vitals overall negative we have discontinued IV fluids.  Continue working with physical therapy.  Noted to be significantly hypertensive over the past couple days.  Increase her amlodipine to 10 mg also with multiple blood pressure medications this is the least likely to make her orthostatic.  We will closely monitor orthostatic vitals, if persistently is orthostatic, de-escalation of blood pressure meds will be initiated.  Otherwise, reports no additional issues or concerns.    Reviewed and approved by ESTEFANÍA SILVA on 1/31/24 at 10:46 PM.

## 2024-02-06 ENCOUNTER — OFFICE VISIT (OUTPATIENT)
Dept: ORTHOPEDIC SURGERY | Facility: CLINIC | Age: 89
End: 2024-02-06
Payer: MEDICARE

## 2024-02-06 ENCOUNTER — HOSPITAL ENCOUNTER (OUTPATIENT)
Dept: RADIOLOGY | Facility: CLINIC | Age: 89
Discharge: HOME | End: 2024-02-06
Payer: MEDICARE

## 2024-02-06 DIAGNOSIS — S72.001S CLOSED FRACTURE OF NECK OF RIGHT FEMUR, SEQUELA: ICD-10-CM

## 2024-02-06 PROCEDURE — 99024 POSTOP FOLLOW-UP VISIT: CPT | Performed by: ORTHOPAEDIC SURGERY

## 2024-02-06 PROCEDURE — 1125F AMNT PAIN NOTED PAIN PRSNT: CPT | Performed by: ORTHOPAEDIC SURGERY

## 2024-02-06 PROCEDURE — 1111F DSCHRG MED/CURRENT MED MERGE: CPT | Performed by: ORTHOPAEDIC SURGERY

## 2024-02-06 PROCEDURE — 1036F TOBACCO NON-USER: CPT | Performed by: ORTHOPAEDIC SURGERY

## 2024-02-06 PROCEDURE — 73502 X-RAY EXAM HIP UNI 2-3 VIEWS: CPT | Mod: RIGHT SIDE | Performed by: ORTHOPAEDIC SURGERY

## 2024-02-06 PROCEDURE — 1157F ADVNC CARE PLAN IN RCRD: CPT | Performed by: ORTHOPAEDIC SURGERY

## 2024-02-06 PROCEDURE — 1159F MED LIST DOCD IN RCRD: CPT | Performed by: ORTHOPAEDIC SURGERY

## 2024-02-06 PROCEDURE — 73502 X-RAY EXAM HIP UNI 2-3 VIEWS: CPT | Mod: RT

## 2024-02-06 NOTE — PROGRESS NOTES
Chief complaint: 3 months out right hip hemiarthroplasty    HPI: Patient feels very good.  Does not have any significant pain at all.  No fevers chills nausea vomiting.  She is in a nursing home being treated there.    Physical exam: Incisions well-healed.  There is no redness warmth swelling puffiness or signs of infection.  She has appropriate hip range of motion.  Appropriate strength in her right lower extremity.    X-rays look good with good positioning of the implant.    Assessment/plan: 2 weeks out right hip hemiarthroplasty.  Continue to precautions.  Will have the staples removed at the nursing home.  She can follow-up with us in 3 months for AP lateral x-rays of the right hip.

## 2024-02-07 ENCOUNTER — NURSING HOME VISIT (OUTPATIENT)
Dept: POST ACUTE CARE | Facility: EXTERNAL LOCATION | Age: 89
End: 2024-02-07
Payer: MEDICARE

## 2024-02-07 DIAGNOSIS — F41.1 GAD (GENERALIZED ANXIETY DISORDER): ICD-10-CM

## 2024-02-07 DIAGNOSIS — E43 SEVERE PROTEIN-CALORIE MALNUTRITION (MULTI): ICD-10-CM

## 2024-02-07 DIAGNOSIS — R53.1 GENERALIZED WEAKNESS: ICD-10-CM

## 2024-02-07 DIAGNOSIS — N18.31 STAGE 3A CHRONIC KIDNEY DISEASE (MULTI): ICD-10-CM

## 2024-02-07 DIAGNOSIS — E78.5 HYPERLIPIDEMIA, UNSPECIFIED HYPERLIPIDEMIA TYPE: ICD-10-CM

## 2024-02-07 DIAGNOSIS — I10 PRIMARY HYPERTENSION: Primary | ICD-10-CM

## 2024-02-07 PROCEDURE — 99309 SBSQ NF CARE MODERATE MDM 30: CPT | Performed by: STUDENT IN AN ORGANIZED HEALTH CARE EDUCATION/TRAINING PROGRAM

## 2024-02-07 ASSESSMENT — ENCOUNTER SYMPTOMS
PSYCHIATRIC NEGATIVE: 1
GASTROINTESTINAL NEGATIVE: 1
CONSTITUTIONAL NEGATIVE: 1
CARDIOVASCULAR NEGATIVE: 1
RESPIRATORY NEGATIVE: 1
MUSCULOSKELETAL NEGATIVE: 1
NEUROLOGICAL NEGATIVE: 1

## 2024-02-07 NOTE — LETTER
"Patient: Fadumo Corona  : 1932    Encounter Date: 2024    Subjective  Patient ID: Fadumo Corona is a 89 y.o. female.    Patient seen and examined at bedside. States that she is doing overall well and symptoms are overall stable. Is no longer orthostatic and feels well. Working well with therapy and states that her strength is getting back to baseline. In addition, she regards that overnight she is having some hallucinations of things that aren't there. Feels as if someone is about to \"attack her\". Finds this very disturbing and fearful and knows that these hallucinations are not there. States that this has never happened before. Regards no additional issues.         Review of Systems   Constitutional: Negative.    HENT: Negative.     Respiratory: Negative.     Cardiovascular: Negative.    Gastrointestinal: Negative.    Musculoskeletal: Negative.    Neurological: Negative.    Psychiatric/Behavioral: Negative.         ObjectiveVitals Reviewed via facility EMR   Physical Exam  Constitutional:       General: She is not in acute distress.     Appearance: She is not ill-appearing.   Eyes:      Pupils: Pupils are equal, round, and reactive to light.   Cardiovascular:      Rate and Rhythm: Normal rate and regular rhythm.      Pulses: Normal pulses.      Heart sounds: No murmur heard.  Pulmonary:      Effort: No respiratory distress.      Breath sounds: No wheezing.   Abdominal:      General: Abdomen is flat. Bowel sounds are normal. There is no distension.   Musculoskeletal:      Right lower leg: No edema.      Left lower leg: No edema.   Skin:     General: Skin is warm and dry.   Neurological:      Mental Status: She is alert. Mental status is at baseline.      Cranial Nerves: No cranial nerve deficit.      Motor: No weakness.   Psychiatric:         Mood and Affect: Mood normal.         Behavior: Behavior normal.         Assessment/Plan  Diagnoses and all orders for this visit:  Primary " hypertension  Severe protein-calorie malnutrition (CMS/HCC)  Generalized weakness  UMAIR (generalized anxiety disorder)      Patient seen and examined at bedside. Overall medically stable and doing well. Continue with PT/OT. Noted to starting to have hallucinations. Recommend holding tramadol, will consult psych. Closely monitor. Obtain UA due to hallucinations    Reviewed and approved by HERMAN SILVA on 2/7/24 at 5:17 PM.         Electronically Signed By: Herman Silva DO   2/7/24  5:17 PM

## 2024-02-07 NOTE — PROGRESS NOTES
"Subjective   Patient ID: Fadumo Corona is a 89 y.o. female.    Patient seen and examined at bedside. States that she is doing overall well and symptoms are overall stable. Is no longer orthostatic and feels well. Working well with therapy and states that her strength is getting back to baseline. In addition, she regards that overnight she is having some hallucinations of things that aren't there. Feels as if someone is about to \"attack her\". Finds this very disturbing and fearful and knows that these hallucinations are not there. States that this has never happened before. Regards no additional issues.         Review of Systems   Constitutional: Negative.    HENT: Negative.     Respiratory: Negative.     Cardiovascular: Negative.    Gastrointestinal: Negative.    Musculoskeletal: Negative.    Neurological: Negative.    Psychiatric/Behavioral: Negative.         Objective Vitals Reviewed via facility EMR   Physical Exam  Constitutional:       General: She is not in acute distress.     Appearance: She is not ill-appearing.   Eyes:      Pupils: Pupils are equal, round, and reactive to light.   Cardiovascular:      Rate and Rhythm: Normal rate and regular rhythm.      Pulses: Normal pulses.      Heart sounds: No murmur heard.  Pulmonary:      Effort: No respiratory distress.      Breath sounds: No wheezing.   Abdominal:      General: Abdomen is flat. Bowel sounds are normal. There is no distension.   Musculoskeletal:      Right lower leg: No edema.      Left lower leg: No edema.   Skin:     General: Skin is warm and dry.   Neurological:      Mental Status: She is alert. Mental status is at baseline.      Cranial Nerves: No cranial nerve deficit.      Motor: No weakness.   Psychiatric:         Mood and Affect: Mood normal.         Behavior: Behavior normal.         Assessment/Plan   Diagnoses and all orders for this visit:  Primary hypertension  Severe protein-calorie malnutrition (CMS/HCC)  Generalized weakness  UMAIR " (generalized anxiety disorder)      Patient seen and examined at bedside. Overall medically stable and doing well. Continue with PT/OT. Noted to starting to have hallucinations. Recommend holding tramadol, will consult psych. Closely monitor. Obtain UA due to hallucinations    Reviewed and approved by ESTEFANÍA SILVA on 2/7/24 at 5:17 PM.

## 2024-02-14 ENCOUNTER — NURSING HOME VISIT (OUTPATIENT)
Dept: POST ACUTE CARE | Facility: EXTERNAL LOCATION | Age: 89
End: 2024-02-14
Payer: MEDICARE

## 2024-02-14 DIAGNOSIS — R53.1 GENERALIZED WEAKNESS: ICD-10-CM

## 2024-02-14 DIAGNOSIS — E43 SEVERE PROTEIN-CALORIE MALNUTRITION (MULTI): ICD-10-CM

## 2024-02-14 DIAGNOSIS — R73.9 HYPERGLYCEMIA: ICD-10-CM

## 2024-02-14 DIAGNOSIS — E78.5 HYPERLIPIDEMIA, UNSPECIFIED HYPERLIPIDEMIA TYPE: Primary | ICD-10-CM

## 2024-02-14 DIAGNOSIS — R29.6 MULTIPLE FALLS: ICD-10-CM

## 2024-02-14 PROCEDURE — 99308 SBSQ NF CARE LOW MDM 20: CPT | Performed by: STUDENT IN AN ORGANIZED HEALTH CARE EDUCATION/TRAINING PROGRAM

## 2024-02-14 NOTE — LETTER
Patient: Fadumo Corona  : 1932    Encounter Date: 2024    Subjective  Patient ID: Fadumo Corona is a 89 y.o. female.    Patient seen and examined at bedside.  She reports that she is overall doing well and working well with therapy.  Regards that she is still having some hallucinations every now and then throughout the night, however has yet to be evaluated by psychiatry. Patient understands that these are not real, with regards to hallucinations, she does not feel severely threatened by them or does not feel unsafe at the facility.  She is agreeable for outpatient follow up. In addition blood pressure has been elevated at times and having some LE edema.  Otherwise, states no additional issues or concerns.        Review of Systems   Constitutional: Negative.    HENT: Negative.     Respiratory: Negative.     Cardiovascular: Negative.    Gastrointestinal: Negative.    Musculoskeletal: Negative.    Neurological: Negative.    Psychiatric/Behavioral:  Positive for hallucinations.        ObjectiveVitals Reviewed via facility EMR   Physical Exam  Constitutional:       General: She is not in acute distress.     Appearance: She is not ill-appearing.   Eyes:      Pupils: Pupils are equal, round, and reactive to light.   Cardiovascular:      Rate and Rhythm: Normal rate and regular rhythm.      Pulses: Normal pulses.      Heart sounds: No murmur heard.  Pulmonary:      Effort: No respiratory distress.      Breath sounds: No wheezing.   Abdominal:      General: Abdomen is flat. Bowel sounds are normal. There is no distension.   Musculoskeletal:      Right lower leg: No edema.      Left lower leg: No edema.   Skin:     General: Skin is warm and dry.   Neurological:      Mental Status: She is alert. Mental status is at baseline.      Cranial Nerves: No cranial nerve deficit.      Motor: No weakness.   Psychiatric:         Mood and Affect: Mood normal.         Behavior: Behavior normal.          Assessment/Plan  Diagnoses and all orders for this visit:  Hyperlipidemia, unspecified hyperlipidemia type  Severe protein-calorie malnutrition (CMS/HCC)  Generalized weakness  Multiple falls  Hyperglycemia    Patient seen and examined.  Still having hallucinations appreciate psych recommendations.  Continue to reorient as necessary.  Obtain duplex ultrasounds due to notable swelling of the lower extremity.  We will check blood sugars, twice daily as I am concerned for morning hypoglycemia or potentially worsening hyperglycemia.  Check a1c if elevated and will need outpatient follow up. Initate of carvedilol 12.5 mg due to elevated blood pressure.  Continue supportive care, dispo planning underway.    Reviewed and approved by HERMAN SILVA on 2/18/24 at 2:25 PM.       Electronically Signed By: Herman Silva DO   2/18/24  2:25 PM

## 2024-02-18 PROBLEM — R73.9 HYPERGLYCEMIA: Status: ACTIVE | Noted: 2024-02-18

## 2024-02-18 ASSESSMENT — ENCOUNTER SYMPTOMS
CONSTITUTIONAL NEGATIVE: 1
HALLUCINATIONS: 1
RESPIRATORY NEGATIVE: 1
CARDIOVASCULAR NEGATIVE: 1
MUSCULOSKELETAL NEGATIVE: 1
GASTROINTESTINAL NEGATIVE: 1
NEUROLOGICAL NEGATIVE: 1

## 2024-02-18 NOTE — PROGRESS NOTES
Subjective   Patient ID: Fadumo Corona is a 89 y.o. female.    Patient seen and examined at bedside.  She reports that she is overall doing well and working well with therapy.  Regards that she is still having some hallucinations every now and then throughout the night, however has yet to be evaluated by psychiatry. Patient understands that these are not real, with regards to hallucinations, she does not feel severely threatened by them or does not feel unsafe at the facility.  She is agreeable for outpatient follow up. In addition blood pressure has been elevated at times and having some LE edema.  Otherwise, states no additional issues or concerns.        Review of Systems   Constitutional: Negative.    HENT: Negative.     Respiratory: Negative.     Cardiovascular: Negative.    Gastrointestinal: Negative.    Musculoskeletal: Negative.    Neurological: Negative.    Psychiatric/Behavioral:  Positive for hallucinations.        Objective Vitals Reviewed via facility EMR   Physical Exam  Constitutional:       General: She is not in acute distress.     Appearance: She is not ill-appearing.   Eyes:      Pupils: Pupils are equal, round, and reactive to light.   Cardiovascular:      Rate and Rhythm: Normal rate and regular rhythm.      Pulses: Normal pulses.      Heart sounds: No murmur heard.  Pulmonary:      Effort: No respiratory distress.      Breath sounds: No wheezing.   Abdominal:      General: Abdomen is flat. Bowel sounds are normal. There is no distension.   Musculoskeletal:      Right lower leg: No edema.      Left lower leg: No edema.   Skin:     General: Skin is warm and dry.   Neurological:      Mental Status: She is alert. Mental status is at baseline.      Cranial Nerves: No cranial nerve deficit.      Motor: No weakness.   Psychiatric:         Mood and Affect: Mood normal.         Behavior: Behavior normal.         Assessment/Plan   Diagnoses and all orders for this visit:  Hyperlipidemia, unspecified  hyperlipidemia type  Severe protein-calorie malnutrition (CMS/HCC)  Generalized weakness  Multiple falls  Hyperglycemia    Patient seen and examined.  Still having hallucinations appreciate psych recommendations.  Continue to reorient as necessary.  Obtain duplex ultrasounds due to notable swelling of the lower extremity.  We will check blood sugars, twice daily as I am concerned for morning hypoglycemia or potentially worsening hyperglycemia.  Check a1c if elevated and will need outpatient follow up. Initate of carvedilol 12.5 mg due to elevated blood pressure.  Continue supportive care, dispo planning underway.    Reviewed and approved by ESTEFANÍA SILVA on 2/18/24 at 2:25 PM.

## 2024-02-19 ENCOUNTER — DOCUMENTATION (OUTPATIENT)
Dept: PRIMARY CARE | Facility: CLINIC | Age: 89
End: 2024-02-19
Payer: MEDICARE

## 2024-02-19 ENCOUNTER — PATIENT OUTREACH (OUTPATIENT)
Dept: PRIMARY CARE | Facility: CLINIC | Age: 89
End: 2024-02-19
Payer: MEDICARE

## 2024-02-19 NOTE — PROGRESS NOTES
Discharge Facility: [facility]  Discharge Diagnosis: Fall, initial encounter   Admission Date: 1/21/2024  Discharge Date: 2/18/2024    PCP Appointment Date: none-task  Specialist Appointment Date: ortho 5/10/2024  Hospital Encounter and Summary: not available at this time   See discharge assessment below for further details    Engagement  Call Start Time: 1245 (2/19/2024 12:45 PM)    Medications  Medications reviewed with patient/caregiver?: Yes (2/19/2024 12:45 PM)  Is the patient having any side effects they believe may be caused by any medication additions or changes?: No (2/19/2024 12:45 PM)  Does the patient have all medications ordered at discharge?: Not applicable (2/19/2024 12:45 PM)  Prescription Comments: limited information from SNF (2/19/2024 12:45 PM)  Is the patient taking all medications as directed (includes completed medication regime)?: Yes (2/19/2024 12:45 PM)  Medication Comments: see med list (2/19/2024 12:45 PM)    Appointments  Does the patient have a primary care provider?: Yes (2/19/2024 12:45 PM)  Care Management Interventions: Advised patient to make appointment (2/19/2024 12:45 PM)  Has the patient kept scheduled appointments due by today?: Yes (2/19/2024 12:45 PM)  Care Management Interventions: Advised patient to keep appointment (2/19/2024 12:45 PM)    Self Management  What is the home health agency?: none (2/19/2024 12:45 PM)  Has home health visited the patient within 72 hours of discharge?: Not applicable (2/19/2024 12:45 PM)    Patient Teaching  Does the patient have access to their discharge instructions?: Yes (2/19/2024 12:45 PM)  Care Management Interventions: Reviewed instructions with patient (2/19/2024 12:45 PM)  What is the patient's perception of their health status since discharge?: Improving (2/19/2024 12:45 PM)  Is the patient/caregiver able to teach back the hierarchy of who to call/visit for symptoms/problems? PCP, Specialist, Home Health nurse, Urgent  Care, ED, 911: Yes (2/19/2024 12:45 PM)    Wrap Up  Wrap Up Additional Comments: CTS spoke with patients daughter. She was just discharged from SNF 2/18/2024 and there is limited information from [facility]. She stated that she was doing better today. They have increased her level of care at the AL so she will be getting more help with getting around and medication help. She does use a walker when ambulating. Daughter was just getting back into town so she wanted time to figure out when she could get her to the PCP for a hospital follow up. She stated that she would call office. She asked if I had any information regarding medications- I do not have an updated med list from SNF. Advised appt with PCP for medications review. No other questions or concerns. (2/19/2024 12:45 PM)  Call End Time: 1252 (2/19/2024 12:45 PM)

## 2024-02-21 ENCOUNTER — TELEPHONE (OUTPATIENT)
Dept: PRIMARY CARE | Facility: CLINIC | Age: 89
End: 2024-02-21
Payer: MEDICARE

## 2024-02-21 NOTE — TELEPHONE ENCOUNTER
Fatuma from [facility] called and stated patients family is asking to DC the order to have BS checked. Please advise.     P: [phone]

## 2024-02-23 ENCOUNTER — TELEPHONE (OUTPATIENT)
Dept: PRIMARY CARE | Facility: CLINIC | Age: 89
End: 2024-02-23
Payer: MEDICARE

## 2024-02-23 NOTE — TELEPHONE ENCOUNTER
Jennifer with [facility] requested verbal ok for start of care for: skilled nursing, PT, and OT. I gave the verbal ok. Jennifer can be reached at [phone].  Pt has 6 mo follow up appointment 3/7/24. I received a message to schedule pt for hosp follow up. I left a message for pt to call me back but she has not returned my call yet.

## 2024-02-27 NOTE — TELEPHONE ENCOUNTER
Spoke with pt's dtr Emelina. Pt had a hip fx, a hip replacement, and was discharged from rehab 2/25/24. Dtr Emelina scheduled a virtual follow up for 3/8/24. Emelina requested a virtual because it is difficult to get pt out of the house right now. Pt is down to 108 lbs.

## 2024-03-01 ENCOUNTER — PATIENT OUTREACH (OUTPATIENT)
Dept: PRIMARY CARE | Facility: CLINIC | Age: 89
End: 2024-03-01
Payer: MEDICARE

## 2024-03-01 NOTE — PROGRESS NOTES
Unable to reach patient for call back 14 days after patient's hospital discharge. No follow up appointment with PCP. LVM with call back number for patient to call if needed

## 2024-03-07 ENCOUNTER — APPOINTMENT (OUTPATIENT)
Dept: PRIMARY CARE | Facility: CLINIC | Age: 89
End: 2024-03-07
Payer: MEDICARE

## 2024-03-08 ENCOUNTER — TELEMEDICINE (OUTPATIENT)
Dept: PRIMARY CARE | Facility: CLINIC | Age: 89
End: 2024-03-08
Payer: MEDICARE

## 2024-03-08 DIAGNOSIS — I10 PRIMARY HYPERTENSION: ICD-10-CM

## 2024-03-08 DIAGNOSIS — E03.9 ACQUIRED HYPOTHYROIDISM: ICD-10-CM

## 2024-03-08 DIAGNOSIS — S72.21XD CLOSED DISPLACED SUBTROCHANTERIC FRACTURE OF RIGHT FEMUR WITH ROUTINE HEALING, SUBSEQUENT ENCOUNTER: Primary | ICD-10-CM

## 2024-03-08 DIAGNOSIS — F41.1 GAD (GENERALIZED ANXIETY DISORDER): ICD-10-CM

## 2024-03-08 DIAGNOSIS — S22.080S COMPRESSION FRACTURE OF T11 VERTEBRA, SEQUELA: ICD-10-CM

## 2024-03-08 PROBLEM — F33.41 RECURRENT MAJOR DEPRESSIVE DISORDER, IN PARTIAL REMISSION (CMS-HCC): Status: ACTIVE | Noted: 2023-11-11

## 2024-03-08 PROBLEM — F41.9 ANXIETY: Status: ACTIVE | Noted: 2023-03-26

## 2024-03-08 PROBLEM — M20.30 ACQUIRED HALLUX MALLEUS: Status: ACTIVE | Noted: 2023-03-26

## 2024-03-08 PROBLEM — E78.2 MIXED HYPERLIPIDEMIA: Status: ACTIVE | Noted: 2023-11-07

## 2024-03-08 PROCEDURE — 1157F ADVNC CARE PLAN IN RCRD: CPT | Performed by: FAMILY MEDICINE

## 2024-03-08 PROCEDURE — 1036F TOBACCO NON-USER: CPT | Performed by: FAMILY MEDICINE

## 2024-03-08 PROCEDURE — 1125F AMNT PAIN NOTED PAIN PRSNT: CPT | Performed by: FAMILY MEDICINE

## 2024-03-08 PROCEDURE — 99214 OFFICE O/P EST MOD 30 MIN: CPT | Performed by: FAMILY MEDICINE

## 2024-03-08 PROCEDURE — 1159F MED LIST DOCD IN RCRD: CPT | Performed by: FAMILY MEDICINE

## 2024-03-08 RX ORDER — POLYETHYLENE GLYCOL 3350 17 G/17G
17 POWDER, FOR SOLUTION ORAL
COMMUNITY
Start: 2024-01-16

## 2024-03-08 RX ORDER — PANTOPRAZOLE SODIUM 40 MG/1
40 TABLET, DELAYED RELEASE ORAL
COMMUNITY
Start: 2024-01-17

## 2024-03-08 NOTE — PROGRESS NOTES
"Fadumo Corona is a 89 y.o. female here today for hospital follow-up.    This visit was completed via telehealth due to the restrictions of the COVID-19 pandemic. All issues as below were discussed and addressed but no physical exam was performed. If it was felt the patient should be evaluated in clinic then they were directed there. The patient verbally consented to this visit.        HPI   I spoke with the patient and her daughter via telehealth.    Patient was admitted to the hospital on 1/16/2024 after unwitnessed fall and complaints of dizziness.  She says she was in the bathroom when she lost her balance and fell.  She apparently could not get up and slept on the floor.  Her CK was initially elevated because of prolonged immobilization.  She was found to have a T11 compression fracture and a right hip fracture.  They were unable to determine if the T11 fracture was chronic or acute.  She was cleared for surgery by cardiology.  She had a right hemiarthroplasty by Dr. Parry on 1/17/2024.  She was discharged to a skilled nursing facility for acute rehab.  She was having issues with constipation and urinary retention immediately after surgery but this resolved over time.     She is at previous assisted living now with [facility] and care assistance.  Children checfk on her frequently.  No pain or constipation.  Having some diarrhea.  Eating not very well because she is \"picky\".  Down to 113 lbs according to daughter.  Drinking Ensure and family buys her extra food.      BP has been good.  [facility] sees her 3 times per week for PT. the patient and her daughter think she is recovering well and she says that she is using her walker at all times to prevent any further falls.  She has a history of recurrent falls but they are usually due to her being noncompliant  regarding ambulation in her apartment.      She says that her chronic medical conditions are all stable and she does not need any refills at this " point.      Current Outpatient Medications:     pantoprazole (ProtoNix) 40 mg EC tablet, Take 1 tablet (40 mg) by mouth., Disp: , Rfl:     polyethylene glycol (Glycolax, Miralax) 17 gram packet, Take 17 g by mouth once daily., Disp: , Rfl:     acetaminophen (Tylenol) 500 mg tablet, Take 1 tablet (500 mg) by mouth every 6 hours if needed for mild pain (1 - 3)., Disp: , Rfl:     amLODIPine (Norvasc) 5 mg tablet, Take 1 tablet (5 mg) by mouth once daily. As directed, Disp: 90 tablet, Rfl: 1    atenolol (Tenormin) 25 mg tablet, Take 1 tablet (25 mg) by mouth once daily., Disp: 90 tablet, Rfl: 1    docusate sodium (Colace) 100 mg capsule, Take 1 capsule (100 mg) by mouth 2 times a day as needed for constipation., Disp: , Rfl:     hydroxychloroquine (Plaquenil) 200 mg tablet, Take 1 tablet (200 mg) by mouth once daily., Disp: 90 tablet, Rfl: 1    hydrOXYzine HCL (Atarax) 25 mg tablet, Take 1 tablet (25 mg) by mouth once daily at bedtime., Disp: 90 tablet, Rfl: 1    latanoprost (Xalatan) 0.005 % ophthalmic solution, Administer 1 drop into both eyes once daily at bedtime., Disp: , Rfl:     levothyroxine (Synthroid, Levoxyl) 100 mcg tablet, Take 1 tablet (100 mcg) by mouth once daily., Disp: 90 tablet, Rfl: 1    montelukast (Singulair) 10 mg tablet, Take 1 tablet (10 mg) by mouth once daily., Disp: , Rfl:     omeprazole (PriLOSEC) 40 mg DR capsule, Take 1 capsule (40 mg) by mouth once daily., Disp: 90 capsule, Rfl: 1    sertraline (Zoloft) 50 mg tablet, Take 0.5 tablets (25 mg) by mouth once daily., Disp: 45 tablet, Rfl: 1    traMADol (Ultram) 50 mg tablet, Take 1 tablet (50 mg) by mouth every 8 hours if needed (moderate to severe pain)., Disp: 10 tablet, Rfl: 0    Patient Active Problem List   Diagnosis    Acquired hammertoe of right foot    Actinic keratosis    UMAIR (generalized anxiety disorder)    BCC (basal cell carcinoma)    Burst fracture of thoracic vertebra (CMS/HCC)    Cataract    Chronic pain of toe of left foot     Chronic pain of toe of right foot    CKD (chronic kidney disease), stage III (CMS/HCC)    Collagen vascular disease (CMS/HCC)    Dysphonia, spasmodic    Edema    Gastroesophageal reflux disease without esophagitis    Fibromyalgia    Generalized weakness    Primary hypertension    Acquired hypothyroidism    IBS (irritable bowel syndrome)    Insomnia    Multiple falls    DJD (degenerative joint disease)    Osteoarthritis    Paresthesia    Rib pain on left side    Right rib fracture    Rosacea    Sciatica of left side    Seasonal allergies    Seborrheic dermatitis    Trochanteric bursitis    Urinary retention    Vitamin D deficiency    Xerosis of skin    Atopic dermatitis    Dyshidrosis    Hallux limitus of left foot    Contusion of rib on left side    Lumbar radiculopathy    Lumbar spondylosis    Lumbosacral spondylosis without myelopathy    Goldstein's neuroma    Muscle weakness    Other voice and resonance disorders    Pes planus    Presence of left artificial knee joint    Enthesopathy of hip region    Greater trochanteric pain syndrome    Myalgia, unspecified site    Pain due to onychomycosis of toenails of both feet    Difficulty in walking, not elsewhere classified    Dysphagia, oropharyngeal phase    Adult failure to thrive    Mixed hyperlipidemia    Major depressive disorder, recurrent, unspecified (CMS/HCC)    Nausea    Severe protein-calorie malnutrition (CMS/HCC)    Thoracic compression fracture (CMS/HCC)    Unspecified asthma, uncomplicated    Failure to thrive in adult    Fall, initial encounter    Closed displaced subtrochanteric fracture of right femur (CMS/HCC)    Orthostatic hypotension    Hyperglycemia    Recurrent major depressive disorder, in partial remission (CMS/HCC)    Anxiety    Acquired hallux malleus         No results found for this or any previous visit (from the past 672 hour(s)).     Objective    Visit Vitals    Visit Vitals  Smoking Status Never       There is no height or weight on file  to calculate BMI.     Physical Exam       Assessment    1. Closed displaced subtrochanteric fracture of right femur with routine healing, subsequent encounter     The patient seems to be healing well after recent right hip replacement secondary to acute fracture.  She will continue with home physical therapy and continue to use her walker as instructed.  She is back at her assisted living and her family is very involved in her care.  She will follow-up with orthopedics as planned.     2. Compression fracture of T11 vertebra, sequela     She says she is not really having any pain from the compression fracture.  We will continue to monitor.     3. Primary hypertension     Condition seems well controlled.  No change in current treatment.       4. Acquired hypothyroidism     Condition seems well controlled.  No change in current treatment.       5. UMAIR (generalized anxiety disorder)     Condition seems well controlled.  No change in current treatment.                   No orders of the defined types were placed in this encounter.       New Medications Ordered This Visit   Medications    pantoprazole (ProtoNix) 40 mg EC tablet     Sig: Take 1 tablet (40 mg) by mouth.    polyethylene glycol (Glycolax, Miralax) 17 gram packet     Sig: Take 17 g by mouth once daily.

## 2024-04-02 ENCOUNTER — PATIENT OUTREACH (OUTPATIENT)
Dept: PRIMARY CARE | Facility: CLINIC | Age: 89
End: 2024-04-02
Payer: MEDICARE

## 2024-04-02 NOTE — PROGRESS NOTES
Call placed regarding one month post discharge follow up call.  At time of outreach call the patient feels as if their condition has improving since initial visit with PCP or specialist.  Questions or concerns regarding recovery period addressed at this time.   Reviewed any PCP or specialists progress notes/labs/radiology reports if applicable and addressed any questions or concerns.    Continue Regimen: Clobetasol twice a day until next follow up appointment Detail Level: Zone

## 2024-04-10 ENCOUNTER — TELEPHONE (OUTPATIENT)
Dept: PRIMARY CARE | Facility: CLINIC | Age: 89
End: 2024-04-10
Payer: MEDICARE

## 2024-04-10 NOTE — TELEPHONE ENCOUNTER
Per dtr Emelina Doss: Wanted Dr. Alvarez to know that pt's eye doctor Dr. Kari Keller wants pt to discontinue Hydrochloroquine. Emelina unsure why, possibly has something to do with glaucoma. Emelina can be reached at [phone]

## 2024-04-30 ENCOUNTER — PATIENT OUTREACH (OUTPATIENT)
Dept: PRIMARY CARE | Facility: CLINIC | Age: 89
End: 2024-04-30
Payer: MEDICARE

## 2024-05-10 ENCOUNTER — APPOINTMENT (OUTPATIENT)
Dept: ORTHOPEDIC SURGERY | Facility: CLINIC | Age: 89
End: 2024-05-10
Payer: MEDICARE

## 2024-06-14 ENCOUNTER — OFFICE VISIT (OUTPATIENT)
Dept: ORTHOPEDIC SURGERY | Facility: CLINIC | Age: 89
End: 2024-06-14
Payer: MEDICARE

## 2024-06-14 ENCOUNTER — HOSPITAL ENCOUNTER (OUTPATIENT)
Dept: RADIOLOGY | Facility: CLINIC | Age: 89
Discharge: HOME | End: 2024-06-14
Payer: MEDICARE

## 2024-06-14 DIAGNOSIS — S72.001S CLOSED FRACTURE OF NECK OF RIGHT FEMUR, SEQUELA: ICD-10-CM

## 2024-06-14 DIAGNOSIS — S72.001S CLOSED FRACTURE OF NECK OF RIGHT FEMUR, SEQUELA: Primary | ICD-10-CM

## 2024-06-14 PROCEDURE — 99213 OFFICE O/P EST LOW 20 MIN: CPT | Performed by: ORTHOPAEDIC SURGERY

## 2024-06-14 PROCEDURE — 73502 X-RAY EXAM HIP UNI 2-3 VIEWS: CPT | Mod: RT

## 2024-06-14 NOTE — PROGRESS NOTES
Fadumo Corona is a 89 y.o. female who presents for Follow-up of the Right Hip (Hemiarthroplasty 1/17/24/5 months out /X-rays today).    HPI:  89-year-old female is here for follow-up surgical visit.  She is 5 months out from a right hip hemiarthroplasty.  She denies any fever chills nausea vomiting night sweats.  She has no bowel or bladder complaints.    Physical exam:  Well-nourished, well kept.  No lymphangitis or lymphadenopathy in the examined extremities.  Patient is alert and oriented x 3.  Normal affect.  Normal coordination.  Examination of the lower extremities reveals no point tenderness, swelling, or deformity.  Range of motion of the hips, knees, and ankles are full without crepitance, instability, or exacerbation of pain. Strength is 5/5 throughout, but with some general deconditioning throughout the lower extremity.    Imaging studies:  2 views of the right hip were obtained and reviewed today.    Assessment:  89-year-old female here for follow-up of right hip hemiarthroplasty.  She is 5 months out from surgery.  She is doing well.  She is having some increased falls lately she fell off of a chair, and then she slipped and fell on her face.  She is not describing any hip discomfort she is walking although sometimes it does get a little sore she is ambulating with a walker.  She has been doing some therapy trying to regain some strength in the legs.    For complete plan and/or surgical details, please refer to Dr. Parry's portion of this split dictation.    -Power Soler PA-C      In a face-to-face encounter, I performed a history and physical examination, discussed pertinent diagnostic studies if indicated, and discussed diagnosis and management strategies with both the patient and the midlevel provider.  I reviewed the midlevel's note and agree with the documented findings and plan of care.    Patient is 5 months out from right hip hemiarthroplasty.  We have not seen her since the 2-week  visit.  She has had some falls.  She wanted to make sure the hip is okay.  X-rays look good.  Hip moves smoothly.  She walks with a walker fine.  There is no issues with the right hip.  She can engage in activities as tolerated and follow up with us on a as needed basis.  Hip x-rays were taken and reviewed today.  She has some rib pain which is a new acute problem from her falls.  She is can to see her primary care doctor for that.    Jeremy Parry MD  Orthopedic surgery

## 2024-07-26 DIAGNOSIS — E03.9 ACQUIRED HYPOTHYROIDISM: ICD-10-CM

## 2024-07-26 RX ORDER — LEVOTHYROXINE SODIUM 100 UG/1
100 TABLET ORAL DAILY
Qty: 90 TABLET | Refills: 0 | Status: CANCELLED | OUTPATIENT
Start: 2024-07-26

## 2024-07-26 NOTE — TELEPHONE ENCOUNTER
Patients daughter called and stated she needs a refill but is very hard to get her to the appointment. She is asking if you can do a virtual with her and then have the assisted living draw her labs for her. Please advise.

## 2024-08-06 DIAGNOSIS — F41.1 GAD (GENERALIZED ANXIETY DISORDER): ICD-10-CM

## 2024-08-07 RX ORDER — SERTRALINE HYDROCHLORIDE 50 MG/1
25 TABLET, FILM COATED ORAL DAILY
Qty: 45 TABLET | Refills: 3 | Status: SHIPPED | OUTPATIENT
Start: 2024-08-07

## 2024-08-23 ENCOUNTER — APPOINTMENT (OUTPATIENT)
Dept: PRIMARY CARE | Facility: CLINIC | Age: 89
End: 2024-08-23
Payer: MEDICARE

## 2024-08-23 DIAGNOSIS — R41.3 MEMORY LOSS: ICD-10-CM

## 2024-08-23 DIAGNOSIS — M35.9: ICD-10-CM

## 2024-08-23 DIAGNOSIS — I10 PRIMARY HYPERTENSION: Primary | ICD-10-CM

## 2024-08-23 DIAGNOSIS — F33.41 RECURRENT MAJOR DEPRESSIVE DISORDER, IN PARTIAL REMISSION (CMS-HCC): ICD-10-CM

## 2024-08-23 DIAGNOSIS — N18.31 STAGE 3A CHRONIC KIDNEY DISEASE (MULTI): ICD-10-CM

## 2024-08-23 DIAGNOSIS — E03.9 ACQUIRED HYPOTHYROIDISM: ICD-10-CM

## 2024-08-23 DIAGNOSIS — K21.9 GASTROESOPHAGEAL REFLUX DISEASE WITHOUT ESOPHAGITIS: ICD-10-CM

## 2024-08-23 DIAGNOSIS — R62.7 FAILURE TO THRIVE IN ADULT: ICD-10-CM

## 2024-08-23 DIAGNOSIS — F41.1 GAD (GENERALIZED ANXIETY DISORDER): ICD-10-CM

## 2024-08-23 PROCEDURE — 99214 OFFICE O/P EST MOD 30 MIN: CPT | Performed by: FAMILY MEDICINE

## 2024-08-23 PROCEDURE — G2211 COMPLEX E/M VISIT ADD ON: HCPCS | Performed by: FAMILY MEDICINE

## 2024-08-23 PROCEDURE — 1157F ADVNC CARE PLAN IN RCRD: CPT | Performed by: FAMILY MEDICINE

## 2024-08-23 NOTE — PROGRESS NOTES
Fadumo Corona is a 89 y.o. female here today for recheck via telehealth.    This visit was completed via telehealth due to the restrictions of the COVID-19 pandemic. All issues as below were discussed and addressed but no physical exam was performed. If it was felt the patient should be evaluated in clinic then they were directed there. The patient verbally consented to this visit.  I spoke with the patient and her daughter.        HPI     HTN recheck -- Patient denies chest pain, SOB, edema, palpitations on review.  Taking medication correctly and denies any side effects.  Always good when checked.       GERD recheck -- Patient reports GI symptoms are well controlled with current treatment.  No heartburn, chest pain, vomiting, diarrhea.  No SE's from current treatment.  Patient wishes to continue the same treatment.        Mood disorder recheck -- Patient feels like condition is well controlled.  Has good control of mood and emotional reactions.  No SE's or problems with medications.  No homicidal or suicidal ideation.  Patient wishes to continue same medications.      Legs are swollen.  Not using compression stockings.  There is no severe swelling or pain secondary to edema.  There is no erythema or skin breaks.    Some memory issues worsening.  No dx of alzheimer's dementia.   Occasional confusion.      Hypothyroidism -- patient reports no problems with dry skin, constipation, cold intolerance, unexplained weight loss, fatigue or heart palpitations. Patient is taking medications correctly with no side effects or problems.      Appetite is Ok.  But she still is not eating very well.  Dtr says wt stable for a few months.  Down to 110 lbs.      No more falls since last visit.  Hip replacement recovery did well.      Current Outpatient Medications:     acetaminophen (Tylenol) 500 mg tablet, Take 1 tablet (500 mg) by mouth every 6 hours if needed for mild pain (1 - 3)., Disp: , Rfl:     amLODIPine (Norvasc) 5 mg  tablet, Take 1 tablet (5 mg) by mouth once daily. As directed, Disp: 90 tablet, Rfl: 1    atenolol (Tenormin) 25 mg tablet, Take 1 tablet (25 mg) by mouth once daily., Disp: 90 tablet, Rfl: 1    docusate sodium (Colace) 100 mg capsule, Take 1 capsule (100 mg) by mouth 2 times a day as needed for constipation., Disp: , Rfl:     hydroxychloroquine (Plaquenil) 200 mg tablet, Take 1 tablet (200 mg) by mouth once daily., Disp: 90 tablet, Rfl: 1    hydrOXYzine HCL (Atarax) 25 mg tablet, Take 1 tablet (25 mg) by mouth once daily at bedtime., Disp: 90 tablet, Rfl: 1    latanoprost (Xalatan) 0.005 % ophthalmic solution, Administer 1 drop into both eyes once daily at bedtime., Disp: , Rfl:     levothyroxine (Synthroid, Levoxyl) 100 mcg tablet, Take 1 tablet (100 mcg) by mouth once daily., Disp: 90 tablet, Rfl: 1    montelukast (Singulair) 10 mg tablet, Take 1 tablet (10 mg) by mouth once daily., Disp: , Rfl:     omeprazole (PriLOSEC) 40 mg DR capsule, Take 1 capsule (40 mg) by mouth once daily., Disp: 90 capsule, Rfl: 1    pantoprazole (ProtoNix) 40 mg EC tablet, Take 1 tablet (40 mg) by mouth., Disp: , Rfl:     polyethylene glycol (Glycolax, Miralax) 17 gram packet, Take 17 g by mouth once daily., Disp: , Rfl:     sertraline (Zoloft) 50 mg tablet, Take 0.5 tablets (25 mg) by mouth once daily., Disp: 45 tablet, Rfl: 3    traMADol (Ultram) 50 mg tablet, Take 1 tablet (50 mg) by mouth every 8 hours if needed (moderate to severe pain)., Disp: 10 tablet, Rfl: 0    Patient Active Problem List   Diagnosis    Acquired hammertoe of right foot    Actinic keratosis    UMAIR (generalized anxiety disorder)    BCC (basal cell carcinoma)    Burst fracture of thoracic vertebra (Multi)    Cataract    Chronic pain of toe of left foot    Chronic pain of toe of right foot    CKD (chronic kidney disease), stage III (Multi)    Collagen vascular disease (Multi)    Dysphonia, spasmodic    Edema    Gastroesophageal reflux disease without esophagitis     Fibromyalgia    Generalized weakness    Primary hypertension    Acquired hypothyroidism    IBS (irritable bowel syndrome)    Insomnia    Multiple falls    DJD (degenerative joint disease)    Osteoarthritis    Paresthesia    Rib pain on left side    Right rib fracture    Rosacea    Sciatica of left side    Seasonal allergies    Seborrheic dermatitis    Trochanteric bursitis    Urinary retention    Vitamin D deficiency    Xerosis of skin    Atopic dermatitis    Dyshidrosis    Hallux limitus of left foot    Contusion of rib on left side    Lumbar radiculopathy    Lumbar spondylosis    Lumbosacral spondylosis without myelopathy    Goldstein's neuroma    Muscle weakness    Other voice and resonance disorders    Pes planus    Presence of left artificial knee joint    Enthesopathy of hip region    Greater trochanteric pain syndrome    Myalgia, unspecified site    Pain due to onychomycosis of toenails of both feet    Difficulty in walking, not elsewhere classified    Dysphagia, oropharyngeal phase    Adult failure to thrive    Mixed hyperlipidemia    Major depressive disorder, recurrent, unspecified (CMS-HCC)    Nausea    Severe protein-calorie malnutrition (Multi)    Thoracic compression fracture (Multi)    Unspecified asthma, uncomplicated (HHS-HCC)    Failure to thrive in adult    Fall, initial encounter    Closed displaced subtrochanteric fracture of right femur (Multi)    Orthostatic hypotension    Hyperglycemia    Recurrent major depressive disorder, in partial remission (CMS-HCC)    Anxiety    Acquired hallux malleus         No results found for this or any previous visit (from the past 672 hour(s)).     Objective    Visit Vitals    Visit Vitals  Smoking Status Never       There is no height or weight on file to calculate BMI.     Physical Exam       Assessment    1. Primary hypertension  Comprehensive Metabolic Panel, CBC   Patient reports no refills are needed today.  Appropriate labs ordered or reviewed.   Condition seems well controlled.  No change in current treatment.    Make a follow up appointment with me for recheck in 6 months.     2. Gastroesophageal reflux disease without esophagitis     Patient reports no refills are needed today.  Condition seems well controlled.  No change in current treatment.       3. UMAIR (generalized anxiety disorder)     Patient reports no refills are needed today.  Condition seems well controlled.  No change in current treatment.       4. Stage 3a chronic kidney disease (Multi)     This has been stable.     5. Acquired hypothyroidism  Thyroid Stimulating Hormone   Appropriate labs ordered or reviewed.  Patient reports no refills are needed today.  Condition seems well controlled.  No change in current treatment.       6. Collagen vascular disease (Multi)     This has been stable     7. Recurrent major depressive disorder, in partial remission (CMS-HCC)     Patient reports no refills are needed today.  Condition seems well controlled.  No change in current treatment.       8. Failure to thrive in adult     The patient and I discussed again the need to increase her caloric intake.  We discussed ways to get more calories.  We will continue to monitor.     9. Memory loss     The patient and her daughter and I discussed some of the changes she is having with memory and mild confusion at times.  I offered to refer her to a neurologist for further evaluation but they declined at this time.  They will monitor and if it continues to worsen they can call us to discuss.               Orders Placed This Encounter   Procedures    Comprehensive Metabolic Panel    CBC    Thyroid Stimulating Hormone        No orders of the defined types were placed in this encounter.

## 2024-09-04 DIAGNOSIS — E03.9 ACQUIRED HYPOTHYROIDISM: ICD-10-CM

## 2024-09-04 RX ORDER — LEVOTHYROXINE SODIUM 100 UG/1
100 TABLET ORAL DAILY
Qty: 90 TABLET | Refills: 3 | Status: SHIPPED | OUTPATIENT
Start: 2024-09-04

## 2024-09-24 ENCOUNTER — TELEPHONE (OUTPATIENT)
Dept: PRIMARY CARE | Facility: CLINIC | Age: 89
End: 2024-09-24
Payer: MEDICARE

## 2024-09-24 DIAGNOSIS — S70.00XS: Primary | ICD-10-CM

## 2024-09-24 RX ORDER — LIDOCAINE 50 MG/G
1 PATCH TOPICAL DAILY
Qty: 60 PATCH | Refills: 1 | Status: SHIPPED | OUTPATIENT
Start: 2024-09-24

## 2024-09-24 NOTE — TELEPHONE ENCOUNTER
Patients daughter calling inquiring what PCP would like for her to do with patient. Patient is having a hard time moving around since falling twice and she is afraid to take patient anywhere. Patients daughter states nothing is broken but is concerned. Patients daughter is also inquiring if PCP can prescribed something for patients pain like a patch or some other form of medication?

## 2024-09-24 NOTE — TELEPHONE ENCOUNTER
Daughter was notified. She wants to know if she can be prescribed a patch to place on the hip she is having pain.

## 2024-10-14 DIAGNOSIS — M35.9: ICD-10-CM

## 2024-10-14 DIAGNOSIS — K21.9 GASTROESOPHAGEAL REFLUX DISEASE WITHOUT ESOPHAGITIS: ICD-10-CM

## 2024-10-14 DIAGNOSIS — I10 PRIMARY HYPERTENSION: ICD-10-CM

## 2024-10-14 DIAGNOSIS — G47.00 INSOMNIA, UNSPECIFIED TYPE: ICD-10-CM

## 2024-10-14 RX ORDER — ATENOLOL 25 MG/1
25 TABLET ORAL DAILY
Qty: 90 TABLET | Refills: 1 | Status: SHIPPED | OUTPATIENT
Start: 2024-10-14

## 2024-10-14 RX ORDER — AMLODIPINE BESYLATE 5 MG/1
5 TABLET ORAL DAILY
Qty: 90 TABLET | Refills: 1 | Status: SHIPPED | OUTPATIENT
Start: 2024-10-14

## 2024-10-14 RX ORDER — HYDROXYZINE HYDROCHLORIDE 25 MG/1
25 TABLET, FILM COATED ORAL NIGHTLY
Qty: 90 TABLET | Refills: 1 | Status: SHIPPED | OUTPATIENT
Start: 2024-10-14

## 2024-10-14 RX ORDER — HYDROXYCHLOROQUINE SULFATE 200 MG/1
200 TABLET, FILM COATED ORAL DAILY
Qty: 90 TABLET | Refills: 1 | Status: SHIPPED | OUTPATIENT
Start: 2024-10-14

## 2024-10-14 RX ORDER — OMEPRAZOLE 40 MG/1
40 CAPSULE, DELAYED RELEASE ORAL DAILY
Qty: 90 CAPSULE | Refills: 1 | Status: SHIPPED | OUTPATIENT
Start: 2024-10-14

## 2024-11-04 ENCOUNTER — TELEPHONE (OUTPATIENT)
Dept: PRIMARY CARE | Facility: CLINIC | Age: 89
End: 2024-11-04
Payer: MEDICARE

## 2024-11-04 NOTE — TELEPHONE ENCOUNTER
JAYY Krause with [facility]: requested a verbal ok to do a U/A. Pt with confusion. I gave the verbal ok. If you need to reach out to  [facility]: [phone].

## 2024-11-20 ENCOUNTER — NURSING HOME VISIT (OUTPATIENT)
Dept: POST ACUTE CARE | Facility: EXTERNAL LOCATION | Age: 89
End: 2024-11-20
Payer: MEDICARE

## 2024-11-20 VITALS
DIASTOLIC BLOOD PRESSURE: 72 MMHG | RESPIRATION RATE: 18 BRPM | SYSTOLIC BLOOD PRESSURE: 118 MMHG | HEART RATE: 74 BPM | OXYGEN SATURATION: 99 % | WEIGHT: 111 LBS | TEMPERATURE: 97.1 F | BODY MASS INDEX: 21.68 KG/M2

## 2024-11-20 DIAGNOSIS — R33.9 URINARY RETENTION: ICD-10-CM

## 2024-11-20 DIAGNOSIS — S72.22XD CLOSED DISPLACED SUBTROCHANTERIC FRACTURE OF LEFT FEMUR WITH ROUTINE HEALING, SUBSEQUENT ENCOUNTER: ICD-10-CM

## 2024-11-20 DIAGNOSIS — R29.6 MULTIPLE FALLS: ICD-10-CM

## 2024-11-20 DIAGNOSIS — I10 PRIMARY HYPERTENSION: ICD-10-CM

## 2024-11-20 DIAGNOSIS — S32.592S CLOSED FRACTURE OF LEFT INFERIOR PUBIC RAMUS, SEQUELA: Primary | ICD-10-CM

## 2024-11-20 PROBLEM — S72.102A: Status: ACTIVE | Noted: 2024-01-16

## 2024-11-20 PROBLEM — S32.592A CLOSED FRACTURE OF LEFT INFERIOR PUBIC RAMUS: Status: ACTIVE | Noted: 2024-11-13

## 2024-11-20 PROCEDURE — 99310 SBSQ NF CARE HIGH MDM 45: CPT | Performed by: NURSE PRACTITIONER

## 2024-11-20 ASSESSMENT — ENCOUNTER SYMPTOMS
VOMITING: 0
CONSTIPATION: 0
COUGH: 0
FEVER: 0
NAUSEA: 0
ABDOMINAL PAIN: 0
SHORTNESS OF BREATH: 0
PALPITATIONS: 0
DIFFICULTY URINATING: 0
FATIGUE: 0
DIARRHEA: 0
CHILLS: 0

## 2024-11-20 NOTE — ASSESSMENT & PLAN NOTE
S/p IM nailing.  Denies any pain when asked.   She does not appear to remember she fell and had surgery  initially.   continue with therapy as able.  Follow up with orthopedics as scheduled.

## 2024-11-20 NOTE — LETTER
Patient: Fadumo Corona  : 1932    Encounter Date: 2024    Subjective  Fadumo Corona is a 89 y.o. female Here for skilled admission following fall with left femur and pubic alison fractures.    HPI   She resides in [facility] and has had multiple falls.  Past medical hx includes HTN, HLD, hypothyroidism, OA, CKD III, after fall, sustained a left femur fx and pubic rami fractures.  S/P IM nailing.  Post op hgb dropped to 6.1, received 2u PRBC. Hgb improved.  She was unable to void after gagnon removed and this was replaced.    Son present in room able to provide hx, she is a poor historian.    She denies any complaints of pain, she does not seem aware of her surgery.      Labs:    WBC: 6.69  Hgb: 8.5  Hct: 26.6  Platelet: 187    Na: 139  K: 4.2  Cl: 106  Co2: 22  BUN: 29  Creatinine: 1.42    MEDS:  Amlodipine  Atenolol  Colace  Plaquenil  Vistaril at hs routine  Latanoprost  Levothyroxine  Miralax  Singulair  Omeprazole  Sertraline  Asa bid x 30 days        Review of Systems   Constitutional:  Negative for chills, fatigue and fever.   Respiratory:  Negative for cough and shortness of breath.    Cardiovascular:  Negative for chest pain and palpitations.   Gastrointestinal:  Negative for abdominal pain, constipation, diarrhea, nausea and vomiting.   Genitourinary:  Negative for difficulty urinating.       Objective  /72   Pulse 74   Temp 36.2 °C (97.1 °F)   Resp 18   Wt 50.3 kg (111 lb)   SpO2 99%   BMI 21.68 kg/m²     Physical Exam  Constitutional:       General: She is not in acute distress.     Comments: Thin, frail appearing resting in bed.    HENT:      Head: Normocephalic and atraumatic.      Comments: Bitemporal wasting  Eyes:      Conjunctiva/sclera: Conjunctivae normal.   Cardiovascular:      Rate and Rhythm: Normal rate and regular rhythm.   Pulmonary:      Effort: Pulmonary effort is normal. No respiratory distress.      Breath sounds: Normal breath sounds.   Abdominal:      General:  Bowel sounds are normal. There is no distension.      Palpations: Abdomen is soft.      Tenderness: There is no abdominal tenderness.   Musculoskeletal:      Right lower leg: No edema.      Left lower leg: No edema.   Skin:     General: Skin is warm and dry.      Comments: Dressing to left lateral thigh clean and intact. Mild edema noted around dressing   Neurological:      General: No focal deficit present.      Mental Status: She is alert.      Comments: Poor historian, oriented times 2.    Psychiatric:         Mood and Affect: Mood normal.         Behavior: Behavior normal.         Assessment & Plan  Closed fracture of left inferior pubic ramus, sequela  S/p multiple falls, she resides in [facility].  continue with therapy as able.  Follow up with orthopedics as scheduled  Closed displaced subtrochanteric fracture of left femur with routine healing, subsequent encounter  S/p IM nailing.  Denies any pain when asked.   She does not appear to remember she fell and had surgery  initially.   continue with therapy as able.  Follow up with orthopedics as scheduled.    Multiple falls  Son present at time of visit, she has had over 6 falls in the last 2 months.  She appears to have cognitive deficits  and per son she forgets to use her walker.   continue with therapy as able.    Primary hypertension  On amlodipine, atenolol.  Continue to monitor and adjust meds based on clinical course.  Once she is more recovered from surgery consider checking orthostatics due to frequent falls  Urinary retention  Failed voiding trial, gagnon in place, consider voiding trial in a few weeks, may need urology referral if she is unable to void  labs/meds/orders reviewed  staff to monitor and notify for any changes.  Hospital records reviewed.  continue with therapy as able.  Ss for discharge planning, resides in [facility]  She has high risk for falls due to cognitive status  Consider voiding trial again in the next few weeks, if fails consider urology  evaluation  Time for coordination of care was greater than 35 minutes with hospital chart review, visit and exam, discussion of treatment plan with patient and also discussion of case with staff.      Electronically Signed By: PIPPA Osborne   11/20/24  3:23 PM

## 2024-11-20 NOTE — PROGRESS NOTES
Subjective   Fadumo Corona is a 89 y.o. female Here for skilled admission following fall with left femur and pubic alison fractures.    HPI   She resides in [facility] and has had multiple falls.  Past medical hx includes HTN, HLD, hypothyroidism, OA, CKD III, after fall, sustained a left femur fx and pubic rami fractures.  S/P IM nailing.  Post op hgb dropped to 6.1, received 2u PRBC. Hgb improved.  She was unable to void after gagnon removed and this was replaced.    Son present in room able to provide hx, she is a poor historian.    She denies any complaints of pain, she does not seem aware of her surgery.      Labs:  11/18  WBC: 6.69  Hgb: 8.5  Hct: 26.6  Platelet: 187    Na: 139  K: 4.2  Cl: 106  Co2: 22  BUN: 29  Creatinine: 1.42    MEDS:  Amlodipine  Atenolol  Colace  Plaquenil  Vistaril at hs routine  Latanoprost  Levothyroxine  Miralax  Singulair  Omeprazole  Sertraline  Asa bid x 30 days        Review of Systems   Constitutional:  Negative for chills, fatigue and fever.   Respiratory:  Negative for cough and shortness of breath.    Cardiovascular:  Negative for chest pain and palpitations.   Gastrointestinal:  Negative for abdominal pain, constipation, diarrhea, nausea and vomiting.   Genitourinary:  Negative for difficulty urinating.       Objective   /72   Pulse 74   Temp 36.2 °C (97.1 °F)   Resp 18   Wt 50.3 kg (111 lb)   SpO2 99%   BMI 21.68 kg/m²     Physical Exam  Constitutional:       General: She is not in acute distress.     Comments: Thin, frail appearing resting in bed.    HENT:      Head: Normocephalic and atraumatic.      Comments: Bitemporal wasting  Eyes:      Conjunctiva/sclera: Conjunctivae normal.   Cardiovascular:      Rate and Rhythm: Normal rate and regular rhythm.   Pulmonary:      Effort: Pulmonary effort is normal. No respiratory distress.      Breath sounds: Normal breath sounds.   Abdominal:      General: Bowel sounds are normal. There is no distension.      Palpations:  Abdomen is soft.      Tenderness: There is no abdominal tenderness.   Musculoskeletal:      Right lower leg: No edema.      Left lower leg: No edema.   Skin:     General: Skin is warm and dry.      Comments: Dressing to left lateral thigh clean and intact. Mild edema noted around dressing   Neurological:      General: No focal deficit present.      Mental Status: She is alert.      Comments: Poor historian, oriented times 2.    Psychiatric:         Mood and Affect: Mood normal.         Behavior: Behavior normal.         Assessment & Plan  Closed fracture of left inferior pubic ramus, sequela  S/p multiple falls, she resides in [facility].  continue with therapy as able.  Follow up with orthopedics as scheduled  Closed displaced subtrochanteric fracture of left femur with routine healing, subsequent encounter  S/p IM nailing.  Denies any pain when asked.   She does not appear to remember she fell and had surgery  initially.   continue with therapy as able.  Follow up with orthopedics as scheduled.    Multiple falls  Son present at time of visit, she has had over 6 falls in the last 2 months.  She appears to have cognitive deficits  and per son she forgets to use her walker.   continue with therapy as able.    Primary hypertension  On amlodipine, atenolol.  Continue to monitor and adjust meds based on clinical course.  Once she is more recovered from surgery consider checking orthostatics due to frequent falls  Urinary retention  Failed voiding trial, gagnon in place, consider voiding trial in a few weeks, may need urology referral if she is unable to void  labs/meds/orders reviewed  staff to monitor and notify for any changes.  Hospital records reviewed.  continue with therapy as able.  Ss for discharge planning, resides in [facility]  She has high risk for falls due to cognitive status  Consider voiding trial again in the next few weeks, if fails consider urology evaluation  Time for coordination of care was greater than 35  minutes with hospital chart review, visit and exam, discussion of treatment plan with patient and also discussion of case with staff.

## 2024-11-20 NOTE — ASSESSMENT & PLAN NOTE
Failed voiding trial, gagnon in place, consider voiding trial in a few weeks, may need urology referral if she is unable to void

## 2024-11-20 NOTE — ASSESSMENT & PLAN NOTE
Son present at time of visit, she has had over 6 falls in the last 2 months.  She appears to have cognitive deficits  and per son she forgets to use her walker.   continue with therapy as able.

## 2024-11-20 NOTE — ASSESSMENT & PLAN NOTE
S/p multiple falls, she resides in [place].  continue with therapy as able.  Follow up with orthopedics as scheduled

## 2024-11-20 NOTE — ASSESSMENT & PLAN NOTE
On amlodipine, atenolol.  Continue to monitor and adjust meds based on clinical course.  Once she is more recovered from surgery consider checking orthostatics due to frequent falls

## 2024-11-22 ENCOUNTER — NURSING HOME VISIT (OUTPATIENT)
Dept: POST ACUTE CARE | Facility: EXTERNAL LOCATION | Age: 89
End: 2024-11-22
Payer: MEDICARE

## 2024-11-22 DIAGNOSIS — S72.102A CLOSED PERTROCHANTERIC FRACTURE OF FEMUR, LEFT, INITIAL ENCOUNTER (MULTI): Primary | ICD-10-CM

## 2024-11-22 DIAGNOSIS — R29.6 MULTIPLE FALLS: ICD-10-CM

## 2024-11-22 DIAGNOSIS — I10 PRIMARY HYPERTENSION: ICD-10-CM

## 2024-11-22 DIAGNOSIS — R53.1 GENERALIZED WEAKNESS: ICD-10-CM

## 2024-11-22 PROCEDURE — 99306 1ST NF CARE HIGH MDM 50: CPT | Performed by: INTERNAL MEDICINE

## 2024-11-22 NOTE — LETTER
Patient: Fadumo Corona  : 1932    Encounter Date: 2024    Subjective  Patient ID: Fadumo Corona is a 89 y.o. female who is acute skilled care being seen and evaluated for multiple medical problems.    HPI   Fadumo Corona is a 89 y.o. female Here for skilled admission following fall with left femur and pubic alison fractures.    HPI   She resides in [facility] and has had multiple falls.  Past medical hx includes HTN, HLD, hypothyroidism, OA, CKD III, after fall, sustained a left femur fx and pubic rami fractures.  S/P IM nailing.  Post op hgb dropped to 6.1, received 2u PRBC. Hgb improved.  She was unable to void after gagnon removed and this was replaced.    Son present in room able to provide hx, she is a poor historian.    She denies any complaints of pain, she does not seem aware of her surgery.    This patient is well-known to me from prior admissions.  She is resting quietly in her bed in no distress.  She is here for rehabilitation following a fall at assisted living that resulted in a femur fracture status post ORIF.  She is considerably more frail than last time I saw her.  She has no new complaints of pain or shortness of breath and appears to be in good spirits and anxious for rehabilitation.         Labs:    WBC: 6.69  Hgb: 8.5  Hct: 26.6  Platelet: 187     Na: 139  K: 4.2  Cl: 106  Co2: 22  BUN: 29  Creatinine: 1.42     MEDS:  Amlodipine  Atenolol  Colace  Plaquenil  Vistaril at hs routine  Latanoprost  Levothyroxine  Miralax  Singulair  Omeprazole  Sertraline  Asa bid x 30 days     Review of Systems   Constitutional:  Negative for chills, fatigue and fever.   Respiratory:  Negative for cough and shortness of breath.    Cardiovascular:  Negative for chest pain and palpitations.   Gastrointestinal:  Negative for abdominal pain, constipation, diarrhea, nausea and vomiting.   Genitourinary:  Negative for difficulty urinating.       Objective  /76   Pulse 72   Resp 18   Wt 54.4 kg  Progress Note    he is a 52y.o. year old male who presents for evalution. Subjective:     Patient here with group home staff for medication refills. Taking Keppra twice daily for seizure disorder Home does not know when last seizure was states he has not had 1 since he has been with them. Tolerating medication fine. Chronic constipation patient states he is having regular bowel movements taking senna daily. Also with seasonal allergies and states these are controlled with Claritin. Reviewed PmHx, RxHx, FmHx, SocHx, AllgHx and updated and dated in the chart. Review of Systems - negative except as listed above in the HPI    Objective: There were no vitals filed for this visit. Current Outpatient Medications   Medication Sig    loratadine (CLARITIN) 10 mg tablet Take 1 Tablet by mouth daily. senna-docusate (Senna Plus) 8.6-50 mg per tablet Take 1 Tablet by mouth nightly. levETIRAcetam (KEPPRA) 500 mg tablet Take 2 Tablets by mouth two (2) times a day. aspirin delayed-release 325 mg tablet TAKE 1 TABLET BY MOUTH ONCE A DAY    divalproex DR (DEPAKOTE) 500 mg tablet Take 1 Tablet by mouth two (2) times a day. lamoTRIgine (LaMICtal) 25 mg tablet Take 1 Tablet by mouth two (2) times a day. risperiDONE (RisperDAL) 2 mg tablet Take 1 Tablet by mouth two (2) times a day. Allergy Relief, cetirizine, 10 mg tablet TAKE 1 TABLET BY MOUTH EVERY DAY    busPIRone (BUSPAR) 10 mg tablet Take 20 mg by mouth two (2) times a day. lamoTRIgine (LaMICtaL) 100 mg tablet Take 100 mg by mouth two (2) times a day. No current facility-administered medications for this visit. Physical Examination: General appearance - alert, well appearing, and in no distress      Assessment/ Plan:   Diagnoses and all orders for this visit:    1. Chronic idiopathic constipation  -     senna-docusate (Senna Plus) 8.6-50 mg per tablet; Take 1 Tablet by mouth nightly.     2. Mood disorder ([facility] [address]  Followed by (120 lb)   SpO2 99%   BMI 23.44 kg/m²     Physical Exam  Constitutional:       General: She is not in acute distress.     Comments: Thin, frail appearing resting in bed.    HENT:      Head: Normocephalic and atraumatic.      Comments: Bitemporal wasting  Eyes:      Conjunctiva/sclera: Conjunctivae normal.   Cardiovascular:      Rate and Rhythm: Normal rate and regular rhythm.   Pulmonary:      Effort: Pulmonary effort is normal. No respiratory distress.      Breath sounds: Normal breath sounds.   Abdominal:      General: Bowel sounds are normal. There is no distension.      Palpations: Abdomen is soft.      Tenderness: There is no abdominal tenderness.   Musculoskeletal:      Right lower leg: No edema.      Left lower leg: No edema.   Skin:     General: Skin is warm and dry.      Comments: Dressing to left lateral thigh clean and intact. Mild edema noted around dressing   Neurological:      General: No focal deficit present.      Mental Status: She is alert.      Comments: Poor historian, oriented times 2.    Psychiatric:         Mood and Affect: Mood normal.         Behavior: Behavior normal.         Assessment/Plan  Problem List Items Addressed This Visit             ICD-10-CM    Generalized weakness R53.1    Primary hypertension I10    Multiple falls R29.6    Closed pertrochanteric fracture of femur, left, initial encounter (Multi) - Primary S72.102A       A.  We will continue with rehabilitative restorative and supportive care as the patient tolerates    B.  Laboratory examinations will be monitored on an ongoing as-needed basis should she remain here long-term care    C.  Disposition will be pending response to rehabilitation overall assessment of patient safety awareness    D.  The patient left close outpatient follow-up with orthopedic surgeon regarding healing of the fracture and ongoing weightbearing status    E.  The patient's prognosis is guarded.      Electronically Signed By: Mehdi Zuleta MD   psychiatry  3. Seizure ([facility] [address]  -     levETIRAcetam (KEPPRA) 500 mg tablet; Take 2 Tablets by mouth two (2) times a day. 4. Environmental and seasonal allergies  -     loratadine (CLARITIN) 10 mg tablet; Take 1 Tablet by mouth daily. Follow-up and Dispositions    Return if symptoms worsen or fail to improve. I have discussed the diagnosis with the patient and the intended plan as seen in the above orders. The patient has received an after-visit summary and questions were answered concerning future plans. Pt conveyed understanding of plan. Medication Side Effects and Warnings were discussed with patient      Mina Ackerman is being evaluated by a Virtual Visit (video visit) encounter to address concerns as mentioned above. A caregiver was present when appropriate. Due to this being a TeleHealth encounter (During [facility] public health emergency), evaluation of the following organ systems was limited: Vitals/Constitutional/EENT/Resp/CV/GI//MS/Neuro/Skin/Heme-Lymph-Imm. Pursuant to the emergency declaration under the [address] [place] [address] authority and the [employer] Act, this Virtual Visit was conducted with patient's (and/or legal guardian's) consent, to reduce the patient's risk of exposure to COVID-19 and provide necessary medical care. The patient (and/or legal guardian) has also been advised to contact this office for worsening conditions or problems, and seek emergency medical treatment and/or call 911 if deemed necessary. Patient identification was verified at the start of the visit: Yes    Services were provided through a video synchronous discussion virtually to substitute for in-person clinic visit. Patient and provider were located at their individual homes.   --Angel Angeles DO on 3/28/2023 at 10:46 AM  12/4/24  3:55 PM

## 2024-11-25 VITALS
OXYGEN SATURATION: 99 % | WEIGHT: 120 LBS | HEART RATE: 72 BPM | RESPIRATION RATE: 18 BRPM | SYSTOLIC BLOOD PRESSURE: 124 MMHG | BODY MASS INDEX: 23.44 KG/M2 | DIASTOLIC BLOOD PRESSURE: 76 MMHG

## 2024-11-25 NOTE — PROGRESS NOTES
Subjective   Patient ID: Fadumo Corona is a 89 y.o. female who is acute skilled care being seen and evaluated for multiple medical problems.    HPI   Fadumo Corona is a 89 y.o. female Here for skilled admission following fall with left femur and pubic alison fractures.    HPI   She resides in [facility] and has had multiple falls.  Past medical hx includes HTN, HLD, hypothyroidism, OA, CKD III, after fall, sustained a left femur fx and pubic rami fractures.  S/P IM nailing.  Post op hgb dropped to 6.1, received 2u PRBC. Hgb improved.  She was unable to void after gagnon removed and this was replaced.    Son present in room able to provide hx, she is a poor historian.    She denies any complaints of pain, she does not seem aware of her surgery.    This patient is well-known to me from prior admissions.  She is resting quietly in her bed in no distress.  She is here for rehabilitation following a fall at assisted living that resulted in a femur fracture status post ORIF.  She is considerably more frail than last time I saw her.  She has no new complaints of pain or shortness of breath and appears to be in good spirits and anxious for rehabilitation.         Labs:  11/18  WBC: 6.69  Hgb: 8.5  Hct: 26.6  Platelet: 187     Na: 139  K: 4.2  Cl: 106  Co2: 22  BUN: 29  Creatinine: 1.42     MEDS:  Amlodipine  Atenolol  Colace  Plaquenil  Vistaril at hs routine  Latanoprost  Levothyroxine  Miralax  Singulair  Omeprazole  Sertraline  Asa bid x 30 days     Review of Systems   Constitutional:  Negative for chills, fatigue and fever.   Respiratory:  Negative for cough and shortness of breath.    Cardiovascular:  Negative for chest pain and palpitations.   Gastrointestinal:  Negative for abdominal pain, constipation, diarrhea, nausea and vomiting.   Genitourinary:  Negative for difficulty urinating.       Objective   /76   Pulse 72   Resp 18   Wt 54.4 kg (120 lb)   SpO2 99%   BMI 23.44 kg/m²     Physical  Exam  Constitutional:       General: She is not in acute distress.     Comments: Thin, frail appearing resting in bed.    HENT:      Head: Normocephalic and atraumatic.      Comments: Bitemporal wasting  Eyes:      Conjunctiva/sclera: Conjunctivae normal.   Cardiovascular:      Rate and Rhythm: Normal rate and regular rhythm.   Pulmonary:      Effort: Pulmonary effort is normal. No respiratory distress.      Breath sounds: Normal breath sounds.   Abdominal:      General: Bowel sounds are normal. There is no distension.      Palpations: Abdomen is soft.      Tenderness: There is no abdominal tenderness.   Musculoskeletal:      Right lower leg: No edema.      Left lower leg: No edema.   Skin:     General: Skin is warm and dry.      Comments: Dressing to left lateral thigh clean and intact. Mild edema noted around dressing   Neurological:      General: No focal deficit present.      Mental Status: She is alert.      Comments: Poor historian, oriented times 2.    Psychiatric:         Mood and Affect: Mood normal.         Behavior: Behavior normal.         Assessment/Plan   Problem List Items Addressed This Visit             ICD-10-CM    Generalized weakness R53.1    Primary hypertension I10    Multiple falls R29.6    Closed pertrochanteric fracture of femur, left, initial encounter (Multi) - Primary S72.102A       A.  We will continue with rehabilitative restorative and supportive care as the patient tolerates    B.  Laboratory examinations will be monitored on an ongoing as-needed basis should she remain here long-term care    C.  Disposition will be pending response to rehabilitation overall assessment of patient safety awareness    D.  The patient left close outpatient follow-up with orthopedic surgeon regarding healing of the fracture and ongoing weightbearing status    E.  The patient's prognosis is guarded.

## 2024-11-27 ENCOUNTER — NURSING HOME VISIT (OUTPATIENT)
Dept: POST ACUTE CARE | Facility: EXTERNAL LOCATION | Age: 89
End: 2024-11-27
Payer: MEDICARE

## 2024-11-27 VITALS
RESPIRATION RATE: 18 BRPM | SYSTOLIC BLOOD PRESSURE: 125 MMHG | BODY MASS INDEX: 23.44 KG/M2 | DIASTOLIC BLOOD PRESSURE: 53 MMHG | OXYGEN SATURATION: 95 % | TEMPERATURE: 97.1 F | WEIGHT: 120 LBS | HEART RATE: 71 BPM

## 2024-11-27 DIAGNOSIS — N18.30 STAGE 3 CHRONIC KIDNEY DISEASE, UNSPECIFIED WHETHER STAGE 3A OR 3B CKD (MULTI): ICD-10-CM

## 2024-11-27 DIAGNOSIS — R53.1 GENERALIZED WEAKNESS: ICD-10-CM

## 2024-11-27 DIAGNOSIS — S72.22XD CLOSED DISPLACED SUBTROCHANTERIC FRACTURE OF LEFT FEMUR WITH ROUTINE HEALING, SUBSEQUENT ENCOUNTER: Primary | ICD-10-CM

## 2024-11-27 DIAGNOSIS — S32.592S CLOSED FRACTURE OF LEFT INFERIOR PUBIC RAMUS, SEQUELA: ICD-10-CM

## 2024-11-27 PROCEDURE — 99309 SBSQ NF CARE MODERATE MDM 30: CPT | Performed by: NURSE PRACTITIONER

## 2024-11-27 ASSESSMENT — ENCOUNTER SYMPTOMS
ABDOMINAL PAIN: 0
COUGH: 0
PALPITATIONS: 0
CHILLS: 0
CONSTIPATION: 0
FEVER: 0
SHORTNESS OF BREATH: 0
DIFFICULTY URINATING: 0
DIARRHEA: 0
FATIGUE: 0
NAUSEA: 0
VOMITING: 0

## 2024-11-27 NOTE — PROGRESS NOTES
Subjective   Fadumo Corona is a 89 y.o. female Here for weekly skilled visit.   HPI   Health problems reviewed and no acute concerns.  Participating in therapy as able.    Eating and drinking fair.  Denies any complaints of pain.  No concerns per staff.  Case discussed with multiple disciplines within the facility.         Review of Systems   Constitutional:  Negative for chills, fatigue and fever.   Respiratory:  Negative for cough and shortness of breath.    Cardiovascular:  Negative for chest pain and palpitations.   Gastrointestinal:  Negative for abdominal pain, constipation, diarrhea, nausea and vomiting.   Genitourinary:  Negative for difficulty urinating.       Objective   /53   Pulse 71   Temp 36.2 °C (97.1 °F)   Resp 18   Wt 54.4 kg (120 lb)   SpO2 95%   BMI 23.44 kg/m²     Physical Exam  Constitutional:       General: She is not in acute distress.     Comments: Thin, frail appearing    HENT:      Head: Normocephalic and atraumatic.      Comments: Bitemporal wasting  Eyes:      Conjunctiva/sclera: Conjunctivae normal.   Cardiovascular:      Rate and Rhythm: Normal rate and regular rhythm.   Pulmonary:      Effort: Pulmonary effort is normal. No respiratory distress.      Breath sounds: Normal breath sounds.   Abdominal:      General: Bowel sounds are normal. There is no distension.      Palpations: Abdomen is soft.      Tenderness: There is no abdominal tenderness.   Musculoskeletal:      Right lower leg: No edema.      Left lower leg: No edema.   Skin:     General: Skin is warm and dry.      Comments: Incision  to left lateral thigh EVITA, staples clean and intact. Mild edema noted    Neurological:      General: No focal deficit present.      Mental Status: She is alert.      Comments: Poor historian, oriented times 2.    Psychiatric:         Mood and Affect: Mood normal.         Behavior: Behavior normal.         Assessment & Plan  Closed displaced subtrochanteric fracture of left femur with  routine healing, subsequent encounter  S/p IM nailing.  Denies any pain when asked.   She does not appear to remember she fell and had surgery  initially.   continue with therapy as able.  Follow up with orthopedics as scheduled.    Closed fracture of left inferior pubic ramus, sequela  S/p multiple falls, she resides in [facility].  continue with therapy as able.  Follow up with orthopedics as scheduled  Generalized weakness  continue with therapy as able.    Stage 3 chronic kidney disease, unspecified whether stage 3a or 3b CKD (Multi)  monitor with routine labs.    labs/meds/orders reviewed  staff to monitor and notify for any changes.  continue with therapy as able.  Ss for discharge planning, resides in [facility]  She has high risk for falls due to cognitive status  Consider voiding trial again in the next few weeks, if fails consider urology evaluation

## 2024-11-27 NOTE — LETTER
Patient: Fadumo Corona  : 1932    Encounter Date: 2024    Subjective  Fadumo Corona is a 89 y.o. female Here for weekly skilled visit.   HPI   Health problems reviewed and no acute concerns.  Participating in therapy as able.    Eating and drinking fair.  Denies any complaints of pain.  No concerns per staff.  Case discussed with multiple disciplines within the facility.         Review of Systems   Constitutional:  Negative for chills, fatigue and fever.   Respiratory:  Negative for cough and shortness of breath.    Cardiovascular:  Negative for chest pain and palpitations.   Gastrointestinal:  Negative for abdominal pain, constipation, diarrhea, nausea and vomiting.   Genitourinary:  Negative for difficulty urinating.       Objective  /53   Pulse 71   Temp 36.2 °C (97.1 °F)   Resp 18   Wt 54.4 kg (120 lb)   SpO2 95%   BMI 23.44 kg/m²     Physical Exam  Constitutional:       General: She is not in acute distress.     Comments: Thin, frail appearing    HENT:      Head: Normocephalic and atraumatic.      Comments: Bitemporal wasting  Eyes:      Conjunctiva/sclera: Conjunctivae normal.   Cardiovascular:      Rate and Rhythm: Normal rate and regular rhythm.   Pulmonary:      Effort: Pulmonary effort is normal. No respiratory distress.      Breath sounds: Normal breath sounds.   Abdominal:      General: Bowel sounds are normal. There is no distension.      Palpations: Abdomen is soft.      Tenderness: There is no abdominal tenderness.   Musculoskeletal:      Right lower leg: No edema.      Left lower leg: No edema.   Skin:     General: Skin is warm and dry.      Comments: Incision  to left lateral thigh EVITA, staples clean and intact. Mild edema noted    Neurological:      General: No focal deficit present.      Mental Status: She is alert.      Comments: Poor historian, oriented times 2.    Psychiatric:         Mood and Affect: Mood normal.         Behavior: Behavior normal.          Assessment & Plan  Closed displaced subtrochanteric fracture of left femur with routine healing, subsequent encounter  S/p IM nailing.  Denies any pain when asked.   She does not appear to remember she fell and had surgery  initially.   continue with therapy as able.  Follow up with orthopedics as scheduled.    Closed fracture of left inferior pubic ramus, sequela  S/p multiple falls, she resides in nursing home.  continue with therapy as able.  Follow up with orthopedics as scheduled  Generalized weakness  continue with therapy as able.    Stage 3 chronic kidney disease, unspecified whether stage 3a or 3b CKD (Multi)  monitor with routine labs.    labs/meds/orders reviewed  staff to monitor and notify for any changes.  continue with therapy as able.  Ss for discharge planning, resides in [facility]  She has high risk for falls due to cognitive status  Consider voiding trial again in the next few weeks, if fails consider urology evaluation        Electronically Signed By: PIPPA Osborne   11/27/24 12:33 PM

## 2024-12-04 ENCOUNTER — NURSING HOME VISIT (OUTPATIENT)
Dept: POST ACUTE CARE | Facility: EXTERNAL LOCATION | Age: 89
End: 2024-12-04
Payer: MEDICARE

## 2024-12-04 VITALS
DIASTOLIC BLOOD PRESSURE: 64 MMHG | BODY MASS INDEX: 19.14 KG/M2 | OXYGEN SATURATION: 96 % | RESPIRATION RATE: 18 BRPM | HEART RATE: 64 BPM | SYSTOLIC BLOOD PRESSURE: 125 MMHG | TEMPERATURE: 97.4 F | WEIGHT: 98 LBS

## 2024-12-04 DIAGNOSIS — F02.B4 MODERATE LATE ONSET ALZHEIMER'S DEMENTIA WITH ANXIETY (MULTI): ICD-10-CM

## 2024-12-04 DIAGNOSIS — S32.592S CLOSED FRACTURE OF MULTIPLE PUBIC RAMI, LEFT, SEQUELA: ICD-10-CM

## 2024-12-04 DIAGNOSIS — I10 PRIMARY HYPERTENSION: ICD-10-CM

## 2024-12-04 DIAGNOSIS — G30.1 MODERATE LATE ONSET ALZHEIMER'S DEMENTIA WITH ANXIETY (MULTI): ICD-10-CM

## 2024-12-04 DIAGNOSIS — N18.31 STAGE 3A CHRONIC KIDNEY DISEASE (MULTI): ICD-10-CM

## 2024-12-04 DIAGNOSIS — S72.22XD CLOSED DISPLACED SUBTROCHANTERIC FRACTURE OF LEFT FEMUR WITH ROUTINE HEALING, SUBSEQUENT ENCOUNTER: Primary | ICD-10-CM

## 2024-12-04 ASSESSMENT — ENCOUNTER SYMPTOMS
COUGH: 0
ABDOMINAL PAIN: 0
FATIGUE: 0
DIARRHEA: 0
CHILLS: 0
SHORTNESS OF BREATH: 0
VOMITING: 0
CONSTIPATION: 0
FEVER: 0
PALPITATIONS: 0
DIFFICULTY URINATING: 0
NAUSEA: 0

## 2024-12-04 NOTE — PROGRESS NOTES
Subjective   Fadumo Corona is a 89 y.o. female Here for weekly skilled visit.   HPI   Health problems reviewed..  Participating in therapy as able.    Eating and drinking fair.  Denies any complaints of pain.  She has had increased confusion and agitation the last few days.  No fever or chills.   Case discussed with multiple disciplines within the facility.         Review of Systems   Constitutional:  Negative for chills, fatigue and fever.   Respiratory:  Negative for cough and shortness of breath.    Cardiovascular:  Negative for chest pain and palpitations.   Gastrointestinal:  Negative for abdominal pain, constipation, diarrhea, nausea and vomiting.   Genitourinary:  Negative for difficulty urinating.       Objective   /64   Pulse 64   Temp 36.3 °C (97.4 °F)   Resp 18   Wt (!) 44.5 kg (98 lb)   SpO2 96%   BMI 19.14 kg/m²     Physical Exam  Constitutional:       General: She is not in acute distress.     Comments: Thin, frail appearing    HENT:      Head: Normocephalic and atraumatic.      Comments: Bitemporal wasting  Eyes:      Conjunctiva/sclera: Conjunctivae normal.   Cardiovascular:      Rate and Rhythm: Normal rate and regular rhythm.   Pulmonary:      Effort: Pulmonary effort is normal. No respiratory distress.      Breath sounds: Normal breath sounds.   Abdominal:      General: Bowel sounds are normal. There is no distension.      Palpations: Abdomen is soft.      Tenderness: There is no abdominal tenderness.   Musculoskeletal:      Right lower leg: No edema.      Left lower leg: No edema.   Skin:     General: Skin is warm and dry.      Comments: Incision  to left lateral thigh EVITA, steristrips  clean and intact. Mild edema noted    Neurological:      General: No focal deficit present.      Mental Status: She is alert.      Comments: Poor historian, oriented times 2.    Psychiatric:         Mood and Affect: Mood normal.         Behavior: Behavior normal.         Assessment & Plan  Closed  displaced subtrochanteric fracture of left femur with routine healing, subsequent encounter  S/p IM nailing.  Denies any pain when asked.   She does not appear to remember she fell and had surgery  initially.   continue with therapy as able.  Follow up with orthopedics as scheduled.    Closed fracture of multiple pubic rami, left, sequela  continue with therapy as able.  Denies any pain when asked  Stage 3a chronic kidney disease (Multi)  monitor with routine labs.    Primary hypertension  On amlodipine, atenolol.  Continue to monitor and adjust meds based on clinical course.  Once she is more recovered from surgery consider checking orthostatics due to frequent falls  Moderate late onset Alzheimer's dementia with anxiety (Multi)  She is more confused and agitated the last few days, will check labs and urine.  labs/meds/orders reviewed  staff to monitor and notify for any changes.  continue with therapy as able.  Ss for discharge planning, resides in [facility]  She has high risk for falls due to cognitive status  Consider voiding trial again in the next few weeks, if fails consider urology evaluation  Check labs and urine due to increased confusion and agitation, non toxic appearing

## 2024-12-04 NOTE — LETTER
Patient: Fadumo Corona  : 1932    Encounter Date: 2024    Subjective  Fadumo Corona is a 89 y.o. female Here for weekly skilled visit.   HPI   Health problems reviewed..  Participating in therapy as able.    Eating and drinking fair.  Denies any complaints of pain.  She has had increased confusion and agitation the last few days.  No fever or chills.   Case discussed with multiple disciplines within the facility.         Review of Systems   Constitutional:  Negative for chills, fatigue and fever.   Respiratory:  Negative for cough and shortness of breath.    Cardiovascular:  Negative for chest pain and palpitations.   Gastrointestinal:  Negative for abdominal pain, constipation, diarrhea, nausea and vomiting.   Genitourinary:  Negative for difficulty urinating.       Objective  /64   Pulse 64   Temp 36.3 °C (97.4 °F)   Resp 18   Wt (!) 44.5 kg (98 lb)   SpO2 96%   BMI 19.14 kg/m²     Physical Exam  Constitutional:       General: She is not in acute distress.     Comments: Thin, frail appearing    HENT:      Head: Normocephalic and atraumatic.      Comments: Bitemporal wasting  Eyes:      Conjunctiva/sclera: Conjunctivae normal.   Cardiovascular:      Rate and Rhythm: Normal rate and regular rhythm.   Pulmonary:      Effort: Pulmonary effort is normal. No respiratory distress.      Breath sounds: Normal breath sounds.   Abdominal:      General: Bowel sounds are normal. There is no distension.      Palpations: Abdomen is soft.      Tenderness: There is no abdominal tenderness.   Musculoskeletal:      Right lower leg: No edema.      Left lower leg: No edema.   Skin:     General: Skin is warm and dry.      Comments: Incision  to left lateral thigh EVITA, steristrips  clean and intact. Mild edema noted    Neurological:      General: No focal deficit present.      Mental Status: She is alert.      Comments: Poor historian, oriented times 2.    Psychiatric:         Mood and Affect: Mood normal.          Behavior: Behavior normal.         Assessment & Plan  Closed displaced subtrochanteric fracture of left femur with routine healing, subsequent encounter  S/p IM nailing.  Denies any pain when asked.   She does not appear to remember she fell and had surgery  initially.   continue with therapy as able.  Follow up with orthopedics as scheduled.    Closed fracture of multiple pubic rami, left, sequela  continue with therapy as able.  Denies any pain when asked  Stage 3a chronic kidney disease (Multi)  monitor with routine labs.    Primary hypertension  On amlodipine, atenolol.  Continue to monitor and adjust meds based on clinical course.  Once she is more recovered from surgery consider checking orthostatics due to frequent falls  Moderate late onset Alzheimer's dementia with anxiety (Multi)  She is more confused and agitated the last few days, will check labs and urine.  labs/meds/orders reviewed  staff to monitor and notify for any changes.  continue with therapy as able.  Ss for discharge planning, resides in [facility]  She has high risk for falls due to cognitive status  Consider voiding trial again in the next few weeks, if fails consider urology evaluation  Check labs and urine due to increased confusion and agitation, non toxic appearing        Electronically Signed By: PIPPA Osborne   12/7/24  1:27 PM

## 2024-12-07 PROBLEM — G30.1 MODERATE LATE ONSET ALZHEIMER'S DEMENTIA WITH ANXIETY (MULTI): Status: ACTIVE | Noted: 2024-12-07

## 2024-12-07 PROBLEM — F02.B4 MODERATE LATE ONSET ALZHEIMER'S DEMENTIA WITH ANXIETY (MULTI): Status: ACTIVE | Noted: 2024-12-07

## 2024-12-07 ASSESSMENT — ENCOUNTER SYMPTOMS
NAUSEA: 0
DIFFICULTY URINATING: 0
FATIGUE: 0
FEVER: 0
VOMITING: 0
CONSTIPATION: 0
CHILLS: 0
SHORTNESS OF BREATH: 0
COUGH: 0
PALPITATIONS: 0
ABDOMINAL PAIN: 0
DIARRHEA: 0

## 2024-12-10 ENCOUNTER — TELEPHONE (OUTPATIENT)
Dept: PRIMARY CARE | Facility: CLINIC | Age: 89
End: 2024-12-10
Payer: MEDICARE

## 2024-12-10 NOTE — TELEPHONE ENCOUNTER
Ivonne from [facility] called asking for a verbal order for hospice evaluation due to Dementia and loss of weight.

## 2024-12-11 ENCOUNTER — NURSING HOME VISIT (OUTPATIENT)
Dept: POST ACUTE CARE | Facility: EXTERNAL LOCATION | Age: 89
End: 2024-12-11
Payer: MEDICARE

## 2024-12-11 VITALS
BODY MASS INDEX: 19.53 KG/M2 | OXYGEN SATURATION: 97 % | SYSTOLIC BLOOD PRESSURE: 121 MMHG | HEART RATE: 59 BPM | DIASTOLIC BLOOD PRESSURE: 59 MMHG | WEIGHT: 100 LBS | TEMPERATURE: 97.6 F | RESPIRATION RATE: 16 BRPM

## 2024-12-11 DIAGNOSIS — F02.B4 MODERATE LATE ONSET ALZHEIMER'S DEMENTIA WITH ANXIETY (MULTI): ICD-10-CM

## 2024-12-11 DIAGNOSIS — S72.25XD CLOSED NONDISPLACED SUBTROCHANTERIC FRACTURE OF LEFT FEMUR WITH ROUTINE HEALING, SUBSEQUENT ENCOUNTER: ICD-10-CM

## 2024-12-11 DIAGNOSIS — I10 PRIMARY HYPERTENSION: ICD-10-CM

## 2024-12-11 DIAGNOSIS — S32.592S CLOSED FRACTURE OF MULTIPLE PUBIC RAMI, LEFT, SEQUELA: Primary | ICD-10-CM

## 2024-12-11 DIAGNOSIS — G30.1 MODERATE LATE ONSET ALZHEIMER'S DEMENTIA WITH ANXIETY (MULTI): ICD-10-CM

## 2024-12-11 DIAGNOSIS — E03.8 OTHER SPECIFIED HYPOTHYROIDISM: ICD-10-CM

## 2024-12-11 DIAGNOSIS — R33.9 URINARY RETENTION: ICD-10-CM

## 2024-12-11 DIAGNOSIS — R53.1 GENERALIZED WEAKNESS: ICD-10-CM

## 2024-12-11 PROCEDURE — 99316 NF DSCHRG MGMT 30 MIN+: CPT | Performed by: NURSE PRACTITIONER

## 2024-12-11 NOTE — LETTER
Patient: Fadumo Corona  : 1932    Encounter Date: 2024    Subjective  Fadumo Corona is a 89 y.o. female Here for weekly skilled visit.   HPI   Health problems reviewed..  Participating in therapy as able.    Eating and drinking fair.  Denies any complaints of pain.  Labs and urine checked due to increased agitation, no acute pathology identified, TSH was elevated and levothyroxine dose increased, repeat 2025.    No fever or chills.   Case discussed with multiple disciplines within the facility.   She is planned for discharge to [facility] later this week.         Review of Systems   Constitutional:  Negative for chills, fatigue and fever.   Respiratory:  Negative for cough and shortness of breath.    Cardiovascular:  Negative for chest pain and palpitations.   Gastrointestinal:  Negative for abdominal pain, constipation, diarrhea, nausea and vomiting.   Genitourinary:  Negative for difficulty urinating.       Objective  /59   Pulse 59   Temp 36.4 °C (97.6 °F)   Resp 16   Wt 45.4 kg (100 lb)   SpO2 97%   BMI 19.53 kg/m²     Physical Exam  Constitutional:       General: She is not in acute distress.     Comments: Thin, frail appearing    HENT:      Head: Normocephalic and atraumatic.      Comments: Bitemporal wasting  Eyes:      Conjunctiva/sclera: Conjunctivae normal.   Cardiovascular:      Rate and Rhythm: Normal rate and regular rhythm.   Pulmonary:      Effort: Pulmonary effort is normal. No respiratory distress.      Breath sounds: Normal breath sounds.   Abdominal:      General: Bowel sounds are normal. There is no distension.      Palpations: Abdomen is soft.      Tenderness: There is no abdominal tenderness.   Musculoskeletal:      Right lower leg: No edema.      Left lower leg: No edema.   Skin:     General: Skin is warm and dry.      Comments: Incision  to left lateral thigh EVITA,   clean and intact.   Neurological:      General: No focal deficit present.      Mental Status:  She is alert.      Comments: Poor historian, oriented times 2.    Psychiatric:         Mood and Affect: Mood normal.         Behavior: Behavior normal.         Assessment & Plan  Closed fracture of multiple pubic rami, left, sequela  continue with therapy as able.  Denies any pain when asked  Closed nondisplaced subtrochanteric fracture of left femur with routine healing, subsequent encounter  S/p IM nailing.  Denies any pain when asked.   She does not appear to remember she fell and had surgery  initially.   continue with therapy as able.  Follow up with orthopedics as scheduled.  She is planned for discharge to [facility]    Generalized weakness  continue with therapy as able.    Moderate late onset Alzheimer's dementia with anxiety (Multi)  She had increased agitation and labs and urine were checked, no acute pathology identified, TSH was elevated and levothyroxine dose increased.  She is planning on discharge later this week to [facility].   Primary hypertension  On amlodipine, atenolol.  Continue to monitor and adjust meds based on clinical course.    Urinary retention  She will gagnon removed 12/16, reinsert if no void in 8 hours.  If she fails voiding trial consider urology evaluation.    Other specified hypothyroidism  TSH elevated, levothyroxine dose increased, TSH to be repeated 1/25.  This will be communciated to [facility] on discharge  labs/meds/orders reviewed  Voiding trial to be completed at [facility]  staff to monitor and notify for any changes.  continue with therapy as able.  Ss for discharge planning, resides in [facility]  Acceptable for discharge with [facility] to [facility] with 24/7 care.    Requires use of assistive device for ambulation.  Folllow up with PCP 1-2 weeks.  Scripts left in anticipation of discharge.  Time for coordination of care was greater than 35 minutes [facility] chart review, visit and exam, discussion with nursing, therapy and ss staff.       Electronically Signed By: JOANNE Osborne-CNP    12/15/24  9:15 PM

## 2024-12-11 NOTE — PROGRESS NOTES
Subjective   Fadumo Corona is a 89 y.o. female Here for weekly skilled visit.   HPI   Health problems reviewed..  Participating in therapy as able.    Eating and drinking fair.  Denies any complaints of pain.  Labs and urine checked due to increased agitation, no acute pathology identified, TSH was elevated and levothyroxine dose increased, repeat 1/2025.    No fever or chills.   Case discussed with multiple disciplines within the facility.   She is planned for discharge to [facility] later this week.         Review of Systems   Constitutional:  Negative for chills, fatigue and fever.   Respiratory:  Negative for cough and shortness of breath.    Cardiovascular:  Negative for chest pain and palpitations.   Gastrointestinal:  Negative for abdominal pain, constipation, diarrhea, nausea and vomiting.   Genitourinary:  Negative for difficulty urinating.       Objective   /59   Pulse 59   Temp 36.4 °C (97.6 °F)   Resp 16   Wt 45.4 kg (100 lb)   SpO2 97%   BMI 19.53 kg/m²     Physical Exam  Constitutional:       General: She is not in acute distress.     Comments: Thin, frail appearing    HENT:      Head: Normocephalic and atraumatic.      Comments: Bitemporal wasting  Eyes:      Conjunctiva/sclera: Conjunctivae normal.   Cardiovascular:      Rate and Rhythm: Normal rate and regular rhythm.   Pulmonary:      Effort: Pulmonary effort is normal. No respiratory distress.      Breath sounds: Normal breath sounds.   Abdominal:      General: Bowel sounds are normal. There is no distension.      Palpations: Abdomen is soft.      Tenderness: There is no abdominal tenderness.   Musculoskeletal:      Right lower leg: No edema.      Left lower leg: No edema.   Skin:     General: Skin is warm and dry.      Comments: Incision  to left lateral thigh EVITA,   clean and intact.   Neurological:      General: No focal deficit present.      Mental Status: She is alert.      Comments: Poor historian, oriented times 2.     Psychiatric:         Mood and Affect: Mood normal.         Behavior: Behavior normal.         Assessment & Plan  Closed fracture of multiple pubic rami, left, sequela  continue with therapy as able.  Denies any pain when asked  Closed nondisplaced subtrochanteric fracture of left femur with routine healing, subsequent encounter  S/p IM nailing.  Denies any pain when asked.   She does not appear to remember she fell and had surgery  initially.   continue with therapy as able.  Follow up with orthopedics as scheduled.  She is planned for discharge to [facility]    Generalized weakness  continue with therapy as able.    Moderate late onset Alzheimer's dementia with anxiety (Multi)  She had increased agitation and labs and urine were checked, no acute pathology identified, TSH was elevated and levothyroxine dose increased.  She is planning on discharge later this week to [facility].   Primary hypertension  On amlodipine, atenolol.  Continue to monitor and adjust meds based on clinical course.    Urinary retention  She will gagnon removed 12/16, reinsert if no void in 8 hours.  If she fails voiding trial consider urology evaluation.    Other specified hypothyroidism  TSH elevated, levothyroxine dose increased, TSH to be repeated 1/25.  This will be communciated to [facility] on discharge  labs/meds/orders reviewed  Voiding trial to be completed at [facility]  staff to monitor and notify for any changes.  continue with therapy as able.  Ss for discharge planning, resides in [facility]  Acceptable for discharge with [facility] to [facility] with 24/7 care.    Requires use of assistive device for ambulation.  Folllow up with PCP 1-2 weeks.  Scripts left in anticipation of discharge.  Time for coordination of care was greater than 35 minutes [facility] chart review, visit and exam, discussion with nursing, therapy and ss staff.

## 2024-12-12 ENCOUNTER — TELEPHONE (OUTPATIENT)
Dept: PRIMARY CARE | Facility: CLINIC | Age: 89
End: 2024-12-12
Payer: MEDICARE

## 2024-12-12 NOTE — TELEPHONE ENCOUNTER
Pt came back to the [facility] from [facility] from a fall, she was on 81 aspirin once a day but they had an order for 2 a day. Added Buspar 5 mg 2 times a day for anxiety. Hydroxyzine 25 mg once at bedtime for anxiety. Singulair 10 mg once a day for asthma. Was on tramadol and tylenol but was taken off. Review and advise.

## 2024-12-15 PROBLEM — E03.9 HYPOTHYROID: Status: ACTIVE | Noted: 2024-12-15

## 2024-12-15 ASSESSMENT — ENCOUNTER SYMPTOMS
VOMITING: 0
ABDOMINAL PAIN: 0
COUGH: 0
NAUSEA: 0
FEVER: 0
DIARRHEA: 0
CHILLS: 0
PALPITATIONS: 0
FATIGUE: 0
DIFFICULTY URINATING: 0
SHORTNESS OF BREATH: 0
CONSTIPATION: 0

## 2024-12-16 NOTE — ASSESSMENT & PLAN NOTE
She will gagnon removed 12/16, reinsert if no void in 8 hours.  If she fails voiding trial consider urology evaluation.

## 2024-12-16 NOTE — ASSESSMENT & PLAN NOTE
S/p IM nailing.  Denies any pain when asked.   She does not appear to remember she fell and had surgery  initially.   continue with therapy as able.  Follow up with orthopedics as scheduled.  She is planned for discharge to [facility]

## 2024-12-16 NOTE — ASSESSMENT & PLAN NOTE
She had increased agitation and labs and urine were checked, no acute pathology identified, TSH was elevated and levothyroxine dose increased.  She is planning on discharge later this week to [facility].

## 2024-12-16 NOTE — ASSESSMENT & PLAN NOTE
TSH elevated, levothyroxine dose increased, TSH to be repeated 1/25.  This will be communciated to [facility] on discharge

## 2025-01-03 ENCOUNTER — TELEPHONE (OUTPATIENT)
Dept: PRIMARY CARE | Facility: CLINIC | Age: OVER 89
End: 2025-01-03

## 2025-01-03 ENCOUNTER — APPOINTMENT (OUTPATIENT)
Dept: PRIMARY CARE | Facility: CLINIC | Age: OVER 89
End: 2025-01-03
Payer: MEDICARE

## 2025-01-03 DIAGNOSIS — Z51.5 HOSPICE CARE PATIENT: Primary | ICD-10-CM

## 2025-01-03 PROBLEM — D62 ACUTE BLOOD LOSS ANEMIA: Status: ACTIVE | Noted: 2024-11-15

## 2025-01-03 PROBLEM — N17.9 AKI (ACUTE KIDNEY INJURY) (CMS-HCC): Status: ACTIVE | Noted: 2024-11-15

## 2025-01-03 PROBLEM — R54 FRAILTY: Status: ACTIVE | Noted: 2024-11-14

## 2025-01-03 NOTE — PROGRESS NOTES
Fadumo Corona is a 89 y.o. female here today for No chief complaint on file.       HPI         Current Outpatient Medications:     acetaminophen (Tylenol) 500 mg tablet, Take 1 tablet (500 mg) by mouth every 6 hours if needed for mild pain (1 - 3)., Disp: , Rfl:     amLODIPine (Norvasc) 5 mg tablet, Take 1 tablet (5 mg) by mouth once daily. As directed, Disp: 90 tablet, Rfl: 1    atenolol (Tenormin) 25 mg tablet, Take 1 tablet (25 mg) by mouth once daily., Disp: 90 tablet, Rfl: 1    busPIRone (Buspar) 5 mg tablet, , Disp: , Rfl:     docusate sodium (Colace) 100 mg capsule, Take 1 capsule (100 mg) by mouth 2 times a day as needed for constipation., Disp: , Rfl:     haloperidol (Haldol) 0.5 mg tablet, , Disp: , Rfl:     hydroxychloroquine (Plaquenil) 200 mg tablet, Take 1 tablet (200 mg) by mouth once daily., Disp: 90 tablet, Rfl: 1    hydrOXYzine HCL (Atarax) 25 mg tablet, Take 1 tablet (25 mg) by mouth once daily at bedtime., Disp: 90 tablet, Rfl: 1    latanoprost (Xalatan) 0.005 % ophthalmic solution, Administer 1 drop into both eyes once daily at bedtime., Disp: , Rfl:     levothyroxine (Synthroid, Levoxyl) 100 mcg tablet, Take 1 tablet (100 mcg) by mouth once daily., Disp: 90 tablet, Rfl: 3    lidocaine (Lidoderm) 5 % patch, Place 1 patch over 12 hours on the skin once daily. Apply to painful area 12 hours per day, remove for 12 hours., Disp: 60 patch, Rfl: 1    montelukast (Singulair) 10 mg tablet, Take 1 tablet (10 mg) by mouth once daily., Disp: , Rfl:     omeprazole (PriLOSEC) 40 mg DR capsule, Take 1 capsule (40 mg) by mouth once daily., Disp: 90 capsule, Rfl: 1    pantoprazole (ProtoNix) 40 mg EC tablet, Take 1 tablet (40 mg) by mouth., Disp: , Rfl:     polyethylene glycol (Glycolax, Miralax) 17 gram packet, Take 17 g by mouth once daily., Disp: , Rfl:     sertraline (Zoloft) 50 mg tablet, Take 0.5 tablets (25 mg) by mouth once daily., Disp: 45 tablet, Rfl: 3    traMADol (Ultram) 50 mg tablet, Take 1  tablet (50 mg) by mouth every 8 hours if needed (moderate to severe pain)., Disp: 10 tablet, Rfl: 0    Patient Active Problem List   Diagnosis    Acquired hammertoe of right foot    Actinic keratosis    UMAIR (generalized anxiety disorder)    BCC (basal cell carcinoma)    Burst fracture of thoracic vertebra (Multi)    Cataract    Chronic pain of toe of left foot    Chronic pain of toe of right foot    CKD (chronic kidney disease), stage III (Multi)    Collagen vascular disease (Multi)    Dysphonia, spasmodic    Edema    Gastroesophageal reflux disease without esophagitis    Fibromyalgia    Generalized weakness    Primary hypertension    IBS (irritable bowel syndrome)    Insomnia    Multiple falls    DJD (degenerative joint disease)    Osteoarthritis    Paresthesia    Rib pain on left side    Right rib fracture    Rosacea    Sciatica of left side    Seasonal allergies    Seborrheic dermatitis    Trochanteric bursitis    Urinary retention    Vitamin D deficiency    Xerosis of skin    Atopic dermatitis    Dyshidrosis    Hallux limitus of left foot    Contusion of rib on left side    Lumbar radiculopathy    Lumbar spondylosis    Lumbosacral spondylosis without myelopathy    Goldstein's neuroma    Muscle weakness    Other voice and resonance disorders    Pes planus    Presence of left artificial knee joint    Enthesopathy of hip region    Greater trochanteric pain syndrome    Myalgia, unspecified site    Pain due to onychomycosis of toenails of both feet    Difficulty in walking, not elsewhere classified    Dysphagia, oropharyngeal phase    Adult failure to thrive    Mixed hyperlipidemia    Major depressive disorder, recurrent, unspecified    Nausea    Severe protein-calorie malnutrition (Multi)    Thoracic compression fracture (Multi)    Unspecified asthma, uncomplicated (HHS-HCC)    Failure to thrive in adult    Fall, initial encounter    Closed pertrochanteric fracture of femur, left, initial encounter (Multi)     Orthostatic hypotension    Hyperglycemia    Recurrent major depressive disorder, in partial remission (CMS-HCC)    Anxiety    Acquired hallux malleus    Closed fracture of left inferior pubic ramus    Closed subtrochanteric fracture of left femur with routine healing    Closed fracture of multiple pubic rami, left, sequela    Moderate late onset Alzheimer's dementia with anxiety (Multi)    Hypothyroid    Acute blood loss anemia    VICKIE (acute kidney injury) (CMS-HCC)    Frailty         Objective    Visit Vitals    Visit Vitals  Smoking Status Never       There is no height or weight on file to calculate BMI.     Physical Exam         Assessment & Plan  Hospice care patient                    No orders of the defined types were placed in this encounter.       No orders of the defined types were placed in this encounter.

## 2025-01-03 NOTE — TELEPHONE ENCOUNTER
I called pt regarding her follow up appointment today because the appointment was confirmed and the weather is bad. Phone number attached to chart is genny Tarango. Pt is back at [facility] in memory care and on hospice. She has been there since 12/13/2024.

## 2025-01-13 DIAGNOSIS — E03.9 ACQUIRED HYPOTHYROIDISM: ICD-10-CM

## 2025-01-13 RX ORDER — LEVOTHYROXINE SODIUM 100 UG/1
100 TABLET ORAL DAILY
Qty: 90 TABLET | Refills: 1 | Status: CANCELLED | OUTPATIENT
Start: 2025-01-13

## 2025-01-14 ENCOUNTER — TELEPHONE (OUTPATIENT)
Dept: PRIMARY CARE | Facility: CLINIC | Age: OVER 89
End: 2025-01-14
Payer: MEDICARE

## 2025-01-14 DIAGNOSIS — E03.9 ACQUIRED HYPOTHYROIDISM: ICD-10-CM

## 2025-01-14 RX ORDER — LEVOTHYROXINE SODIUM 100 UG/1
100 TABLET ORAL DAILY
Qty: 90 TABLET | Refills: 3 | Status: SHIPPED | OUTPATIENT
Start: 2025-01-14

## 2025-01-14 RX ORDER — LEVOTHYROXINE SODIUM 100 UG/1
100 TABLET ORAL DAILY
Qty: 90 TABLET | Refills: 0 | Status: SHIPPED | OUTPATIENT
Start: 2025-01-14 | End: 2025-01-14 | Stop reason: SDUPTHER

## 2025-01-14 NOTE — TELEPHONE ENCOUNTER
[facility] [phone]  Fax [phone]        Refill on levothyroxine 100 mcg daily #90      Was called into [facility] and needs to be changed to [facility]. Patient is out of meds . Please send to Dr. Tellez for coverage while Rish is out

## (undated) DEVICE — GLOVE, SURGICAL, PROTEXIS PI BLUE W/NEUTHERA, 8.0, PF, LF

## (undated) DEVICE — DRAPE, SHEET, U, W/ADHESIVE STRIP, IMPERVIOUS, 60 X 70 IN, DISPOSABLE, LF, STERILE

## (undated) DEVICE — GOWN, SURGICAL, SMARTGOWN, XX-LARGE, STERILE

## (undated) DEVICE — SOLUTION, IRRIGATION, SODIUM CHLORIDE 0.9%, 1000 ML, POUR BOTTLE

## (undated) DEVICE — DRESSING, AQUACEL AG, HYDROFIBER W/SILVER, 3.5 X 9.75 IN

## (undated) DEVICE — SUTURE, VICRYL, 0, 36 IN, CT-1, UNDYED

## (undated) DEVICE — RETRIEVER, SUTURE, HEWSON

## (undated) DEVICE — TOWEL PACK, STERILE, 4/PACK, BLUE

## (undated) DEVICE — BLADE, SAGITTAL, THIN, SHORT, LF

## (undated) DEVICE — DRAPE, INSTRUMENT, W/POUCH, STERI DRAPE, 7 X 11 IN, DISPOSABLE, STERILE

## (undated) DEVICE — Device

## (undated) DEVICE — DRAPE, SHEET, U, STERI DRAPE, 47 X 51 IN, DISPOSABLE, STERILE

## (undated) DEVICE — HIGH FLOW TIP FOR INTERPULSE HANDPIECE SET

## (undated) DEVICE — DRESSING, GAUZE, PETROLATUM, PATCH, XEROFORM, 1 X 8 IN, STERILE

## (undated) DEVICE — BANDAGE, COFLEX, 6 X 5 YDS, FOAM TAN, STERILE, LF

## (undated) DEVICE — SUTURE, VICRYL, 2-0, 36 IN, CT-1, UNDYED

## (undated) DEVICE — CEMENT MIX KIT, REVOLUTION, W/BREAKAWAY

## (undated) DEVICE — GLOVE, SURGICAL, PROTEXIS PI , 7.5, PF, LF

## (undated) DEVICE — SOLUTION, IRRIGATION, STERILE WATER, 1000 ML, POUR BOTTLE

## (undated) DEVICE — SUTURE, VICRYL, 2-0, 18 IN CP-2, UNDYED

## (undated) DEVICE — EVACUATOR, WOUND, SUCTION, CLOSED, JACKSON-PRATT, 100 CC, SILICONE

## (undated) DEVICE — APPLICATOR, CHLORAPREP, W/ORANGE TINT, 26ML

## (undated) DEVICE — WOUND SYSTEM, DEBRIDEMENT & CLEANING, O.R DUOPAK

## (undated) DEVICE — PILLOW, ABDUCTION, MEDIUM